# Patient Record
Sex: MALE | Race: WHITE | Employment: OTHER | URBAN - METROPOLITAN AREA
[De-identification: names, ages, dates, MRNs, and addresses within clinical notes are randomized per-mention and may not be internally consistent; named-entity substitution may affect disease eponyms.]

---

## 2019-02-22 ENCOUNTER — HOSPITAL ENCOUNTER (OUTPATIENT)
Dept: LAB | Age: 74
Discharge: HOME | End: 2019-02-22

## 2019-05-09 ENCOUNTER — APPOINTMENT (OUTPATIENT)
Dept: CT IMAGING | Age: 74
DRG: 234 | End: 2019-05-09
Attending: NURSE PRACTITIONER
Payer: MEDICARE

## 2019-05-09 ENCOUNTER — APPOINTMENT (OUTPATIENT)
Dept: VASCULAR SURGERY | Age: 74
DRG: 234 | End: 2019-05-09
Attending: NURSE PRACTITIONER
Payer: MEDICARE

## 2019-05-09 ENCOUNTER — APPOINTMENT (OUTPATIENT)
Dept: GENERAL RADIOLOGY | Age: 74
DRG: 234 | End: 2019-05-09
Attending: NURSE PRACTITIONER
Payer: MEDICARE

## 2019-05-09 ENCOUNTER — HOSPITAL ENCOUNTER (INPATIENT)
Age: 74
LOS: 11 days | Discharge: HOME HEALTH | DRG: 234 | End: 2019-05-20
Attending: INTERNAL MEDICINE | Admitting: THORACIC SURGERY (CARDIOTHORACIC VASCULAR SURGERY)
Payer: MEDICARE

## 2019-05-09 DIAGNOSIS — R94.30 NONSPECIFIC ABNORMAL FUNCTION STUDY, CARDIOVASCULAR: ICD-10-CM

## 2019-05-09 DIAGNOSIS — I25.10 CORONARY ARTERY DISEASE INVOLVING NATIVE CORONARY ARTERY OF NATIVE HEART, ANGINA PRESENCE UNSPECIFIED: ICD-10-CM

## 2019-05-09 DIAGNOSIS — I25.119 CORONARY ARTERY DISEASE WITH ANGINA PECTORIS, UNSPECIFIED VESSEL OR LESION TYPE, UNSPECIFIED WHETHER NATIVE OR TRANSPLANTED HEART (HCC): ICD-10-CM

## 2019-05-09 DIAGNOSIS — Z95.1 S/P CABG X 3: Primary | ICD-10-CM

## 2019-05-09 LAB
ABO + RH BLD: NORMAL
ALBUMIN SERPL-MCNC: 2.8 G/DL (ref 3.5–5)
ALBUMIN/GLOB SERPL: 0.9 {RATIO} (ref 1.1–2.2)
ALP SERPL-CCNC: 98 U/L (ref 45–117)
ALT SERPL-CCNC: 25 U/L (ref 12–78)
ANION GAP SERPL CALC-SCNC: 6 MMOL/L (ref 5–15)
APPEARANCE UR: CLEAR
APTT PPP: 29.6 SEC (ref 22.1–32)
AST SERPL-CCNC: 21 U/L (ref 15–37)
BACTERIA URNS QL MICRO: NEGATIVE /HPF
BASOPHILS # BLD: 0 K/UL (ref 0–0.1)
BASOPHILS NFR BLD: 0 % (ref 0–1)
BILIRUB SERPL-MCNC: 0.3 MG/DL (ref 0.2–1)
BILIRUB UR QL: NEGATIVE
BLOOD GROUP ANTIBODIES SERPL: NORMAL
BNP SERPL-MCNC: 129 PG/ML
BUN SERPL-MCNC: 15 MG/DL (ref 6–20)
BUN/CREAT SERPL: 19 (ref 12–20)
CALCIUM SERPL-MCNC: 8 MG/DL (ref 8.5–10.1)
CHLORIDE SERPL-SCNC: 111 MMOL/L (ref 97–108)
CO2 SERPL-SCNC: 25 MMOL/L (ref 21–32)
COLOR UR: ABNORMAL
CREAT SERPL-MCNC: 0.8 MG/DL (ref 0.7–1.3)
DIFFERENTIAL METHOD BLD: ABNORMAL
EOSINOPHIL # BLD: 0.3 K/UL (ref 0–0.4)
EOSINOPHIL NFR BLD: 2 % (ref 0–7)
EPITH CASTS URNS QL MICRO: ABNORMAL /LPF
ERYTHROCYTE [DISTWIDTH] IN BLOOD BY AUTOMATED COUNT: 12.7 % (ref 11.5–14.5)
EST. AVERAGE GLUCOSE BLD GHB EST-MCNC: 114 MG/DL
GLOBULIN SER CALC-MCNC: 3.2 G/DL (ref 2–4)
GLUCOSE SERPL-MCNC: 98 MG/DL (ref 65–100)
GLUCOSE UR STRIP.AUTO-MCNC: NEGATIVE MG/DL
HBA1C MFR BLD: 5.6 % (ref 4.2–6.3)
HCT VFR BLD AUTO: 41.5 % (ref 36.6–50.3)
HGB BLD-MCNC: 13.6 G/DL (ref 12.1–17)
HGB UR QL STRIP: ABNORMAL
HYALINE CASTS URNS QL MICRO: ABNORMAL /LPF (ref 0–5)
IMM GRANULOCYTES # BLD AUTO: 0.1 K/UL (ref 0–0.04)
IMM GRANULOCYTES NFR BLD AUTO: 0 % (ref 0–0.5)
INR PPP: 1 (ref 0.9–1.1)
KETONES UR QL STRIP.AUTO: NEGATIVE MG/DL
LEUKOCYTE ESTERASE UR QL STRIP.AUTO: NEGATIVE
LYMPHOCYTES # BLD: 1.7 K/UL (ref 0.8–3.5)
LYMPHOCYTES NFR BLD: 14 % (ref 12–49)
MAGNESIUM SERPL-MCNC: 2.3 MG/DL (ref 1.6–2.4)
MCH RBC QN AUTO: 30.4 PG (ref 26–34)
MCHC RBC AUTO-ENTMCNC: 32.8 G/DL (ref 30–36.5)
MCV RBC AUTO: 92.6 FL (ref 80–99)
MONOCYTES # BLD: 1 K/UL (ref 0–1)
MONOCYTES NFR BLD: 9 % (ref 5–13)
NEUTS SEG # BLD: 9.1 K/UL (ref 1.8–8)
NEUTS SEG NFR BLD: 75 % (ref 32–75)
NITRITE UR QL STRIP.AUTO: NEGATIVE
NRBC # BLD: 0 K/UL (ref 0–0.01)
NRBC BLD-RTO: 0 PER 100 WBC
PH UR STRIP: 5 [PH] (ref 5–8)
PLATELET # BLD AUTO: 313 K/UL (ref 150–400)
PMV BLD AUTO: 10 FL (ref 8.9–12.9)
POTASSIUM SERPL-SCNC: 4 MMOL/L (ref 3.5–5.1)
PROT SERPL-MCNC: 6 G/DL (ref 6.4–8.2)
PROT UR STRIP-MCNC: NEGATIVE MG/DL
PROTHROMBIN TIME: 10.4 SEC (ref 9–11.1)
RBC # BLD AUTO: 4.48 M/UL (ref 4.1–5.7)
RBC #/AREA URNS HPF: ABNORMAL /HPF (ref 0–5)
SODIUM SERPL-SCNC: 142 MMOL/L (ref 136–145)
SP GR UR REFRACTOMETRY: 1.01 (ref 1–1.03)
SPECIMEN EXP DATE BLD: NORMAL
THERAPEUTIC RANGE,PTTT: NORMAL SECS (ref 58–77)
TSH SERPL DL<=0.05 MIU/L-ACNC: 0.72 UIU/ML (ref 0.36–3.74)
UA: UC IF INDICATED,UAUC: ABNORMAL
UROBILINOGEN UR QL STRIP.AUTO: 0.2 EU/DL (ref 0.2–1)
WBC # BLD AUTO: 12.1 K/UL (ref 4.1–11.1)
WBC URNS QL MICRO: ABNORMAL /HPF (ref 0–4)

## 2019-05-09 PROCEDURE — 74011250637 HC RX REV CODE- 250/637: Performed by: NURSE PRACTITIONER

## 2019-05-09 PROCEDURE — 74011000258 HC RX REV CODE- 258: Performed by: RADIOLOGY

## 2019-05-09 PROCEDURE — 36415 COLL VENOUS BLD VENIPUNCTURE: CPT

## 2019-05-09 PROCEDURE — B2151ZZ FLUOROSCOPY OF LEFT HEART USING LOW OSMOLAR CONTRAST: ICD-10-PCS | Performed by: INTERNAL MEDICINE

## 2019-05-09 PROCEDURE — 74011000250 HC RX REV CODE- 250: Performed by: NURSE PRACTITIONER

## 2019-05-09 PROCEDURE — 86900 BLOOD TYPING SEROLOGIC ABO: CPT

## 2019-05-09 PROCEDURE — 74011250636 HC RX REV CODE- 250/636: Performed by: INTERNAL MEDICINE

## 2019-05-09 PROCEDURE — 85025 COMPLETE CBC W/AUTO DIFF WBC: CPT

## 2019-05-09 PROCEDURE — 93458 L HRT ARTERY/VENTRICLE ANGIO: CPT | Performed by: INTERNAL MEDICINE

## 2019-05-09 PROCEDURE — 77030019569 HC BND COMPR RAD TERU -B: Performed by: INTERNAL MEDICINE

## 2019-05-09 PROCEDURE — 77030013744: Performed by: INTERNAL MEDICINE

## 2019-05-09 PROCEDURE — 74011000250 HC RX REV CODE- 250: Performed by: INTERNAL MEDICINE

## 2019-05-09 PROCEDURE — 85730 THROMBOPLASTIN TIME PARTIAL: CPT

## 2019-05-09 PROCEDURE — 83036 HEMOGLOBIN GLYCOSYLATED A1C: CPT

## 2019-05-09 PROCEDURE — 80053 COMPREHEN METABOLIC PANEL: CPT

## 2019-05-09 PROCEDURE — B2111ZZ FLUOROSCOPY OF MULTIPLE CORONARY ARTERIES USING LOW OSMOLAR CONTRAST: ICD-10-PCS | Performed by: INTERNAL MEDICINE

## 2019-05-09 PROCEDURE — 93880 EXTRACRANIAL BILAT STUDY: CPT

## 2019-05-09 PROCEDURE — 74011250636 HC RX REV CODE- 250/636

## 2019-05-09 PROCEDURE — 85610 PROTHROMBIN TIME: CPT

## 2019-05-09 PROCEDURE — 99152 MOD SED SAME PHYS/QHP 5/>YRS: CPT | Performed by: INTERNAL MEDICINE

## 2019-05-09 PROCEDURE — 94640 AIRWAY INHALATION TREATMENT: CPT

## 2019-05-09 PROCEDURE — 71275 CT ANGIOGRAPHY CHEST: CPT

## 2019-05-09 PROCEDURE — 93005 ELECTROCARDIOGRAM TRACING: CPT

## 2019-05-09 PROCEDURE — 83735 ASSAY OF MAGNESIUM: CPT

## 2019-05-09 PROCEDURE — 4A023N7 MEASUREMENT OF CARDIAC SAMPLING AND PRESSURE, LEFT HEART, PERCUTANEOUS APPROACH: ICD-10-PCS | Performed by: INTERNAL MEDICINE

## 2019-05-09 PROCEDURE — 81001 URINALYSIS AUTO W/SCOPE: CPT

## 2019-05-09 PROCEDURE — 77030029684 HC NEB SM VOL KT MONA -A

## 2019-05-09 PROCEDURE — 84443 ASSAY THYROID STIM HORMONE: CPT

## 2019-05-09 PROCEDURE — 74174 CTA ABD&PLVS W/CONTRAST: CPT

## 2019-05-09 PROCEDURE — C1769 GUIDE WIRE: HCPCS | Performed by: INTERNAL MEDICINE

## 2019-05-09 PROCEDURE — 74011636320 HC RX REV CODE- 636/320: Performed by: RADIOLOGY

## 2019-05-09 PROCEDURE — 83880 ASSAY OF NATRIURETIC PEPTIDE: CPT

## 2019-05-09 PROCEDURE — 77030004532 HC CATH ANGI DX IMP BSC -A: Performed by: INTERNAL MEDICINE

## 2019-05-09 PROCEDURE — C1894 INTRO/SHEATH, NON-LASER: HCPCS | Performed by: INTERNAL MEDICINE

## 2019-05-09 PROCEDURE — 99153 MOD SED SAME PHYS/QHP EA: CPT | Performed by: INTERNAL MEDICINE

## 2019-05-09 PROCEDURE — 74011636320 HC RX REV CODE- 636/320: Performed by: INTERNAL MEDICINE

## 2019-05-09 PROCEDURE — 65660000000 HC RM CCU STEPDOWN

## 2019-05-09 RX ORDER — TAMSULOSIN HYDROCHLORIDE 0.4 MG/1
0.4 CAPSULE ORAL DAILY
Status: DISCONTINUED | OUTPATIENT
Start: 2019-05-10 | End: 2019-05-10

## 2019-05-09 RX ORDER — SODIUM CHLORIDE 0.9 % (FLUSH) 0.9 %
5-40 SYRINGE (ML) INJECTION AS NEEDED
Status: DISCONTINUED | OUTPATIENT
Start: 2019-05-09 | End: 2019-05-14 | Stop reason: HOSPADM

## 2019-05-09 RX ORDER — HEPARIN SODIUM 200 [USP'U]/100ML
INJECTION, SOLUTION INTRAVENOUS
Status: COMPLETED | OUTPATIENT
Start: 2019-05-09 | End: 2019-05-09

## 2019-05-09 RX ORDER — SODIUM CHLORIDE 9 MG/ML
1.5 INJECTION, SOLUTION INTRAVENOUS CONTINUOUS
Status: DISPENSED | OUTPATIENT
Start: 2019-05-09 | End: 2019-05-09

## 2019-05-09 RX ORDER — TAMSULOSIN HYDROCHLORIDE 0.4 MG/1
0.4 CAPSULE ORAL DAILY
COMMUNITY

## 2019-05-09 RX ORDER — ATORVASTATIN CALCIUM 40 MG/1
40 TABLET, FILM COATED ORAL
COMMUNITY

## 2019-05-09 RX ORDER — ALPRAZOLAM 0.5 MG/1
0.5 TABLET ORAL
Status: DISCONTINUED | OUTPATIENT
Start: 2019-05-09 | End: 2019-05-20 | Stop reason: HOSPADM

## 2019-05-09 RX ORDER — SODIUM CHLORIDE 0.9 % (FLUSH) 0.9 %
5-40 SYRINGE (ML) INJECTION EVERY 8 HOURS
Status: DISCONTINUED | OUTPATIENT
Start: 2019-05-09 | End: 2019-05-14 | Stop reason: HOSPADM

## 2019-05-09 RX ORDER — IBUPROFEN 200 MG
1 TABLET ORAL DAILY
Status: DISCONTINUED | OUTPATIENT
Start: 2019-05-09 | End: 2019-05-20 | Stop reason: HOSPADM

## 2019-05-09 RX ORDER — GUAIFENESIN 100 MG/5ML
81 LIQUID (ML) ORAL DAILY
Status: DISCONTINUED | OUTPATIENT
Start: 2019-05-10 | End: 2019-05-14 | Stop reason: HOSPADM

## 2019-05-09 RX ORDER — FLUTICASONE PROPIONATE 50 MCG
2 SPRAY, SUSPENSION (ML) NASAL DAILY
Status: DISCONTINUED | OUTPATIENT
Start: 2019-05-10 | End: 2019-05-20 | Stop reason: HOSPADM

## 2019-05-09 RX ORDER — PANTOPRAZOLE SODIUM 40 MG/1
40 TABLET, DELAYED RELEASE ORAL DAILY
Status: DISCONTINUED | OUTPATIENT
Start: 2019-05-10 | End: 2019-05-14 | Stop reason: SDUPTHER

## 2019-05-09 RX ORDER — CETIRIZINE HCL 10 MG
10 TABLET ORAL DAILY
Status: DISCONTINUED | OUTPATIENT
Start: 2019-05-10 | End: 2019-05-20 | Stop reason: HOSPADM

## 2019-05-09 RX ORDER — LIDOCAINE HYDROCHLORIDE 10 MG/ML
INJECTION INFILTRATION; PERINEURAL AS NEEDED
Status: DISCONTINUED | OUTPATIENT
Start: 2019-05-09 | End: 2019-05-09 | Stop reason: HOSPADM

## 2019-05-09 RX ORDER — DIPHENHYDRAMINE HYDROCHLORIDE 50 MG/ML
INJECTION, SOLUTION INTRAMUSCULAR; INTRAVENOUS AS NEEDED
Status: DISCONTINUED | OUTPATIENT
Start: 2019-05-09 | End: 2019-05-09 | Stop reason: HOSPADM

## 2019-05-09 RX ORDER — MONTELUKAST SODIUM 10 MG/1
10 TABLET ORAL DAILY
COMMUNITY

## 2019-05-09 RX ORDER — PANTOPRAZOLE SODIUM 40 MG/1
40 TABLET, DELAYED RELEASE ORAL DAILY
COMMUNITY
End: 2019-06-13

## 2019-05-09 RX ORDER — ATORVASTATIN CALCIUM 40 MG/1
40 TABLET, FILM COATED ORAL DAILY
Status: DISCONTINUED | OUTPATIENT
Start: 2019-05-10 | End: 2019-05-20 | Stop reason: HOSPADM

## 2019-05-09 RX ORDER — ALBUTEROL SULFATE 90 UG/1
1 AEROSOL, METERED RESPIRATORY (INHALATION) AS NEEDED
COMMUNITY

## 2019-05-09 RX ORDER — FENTANYL CITRATE 50 UG/ML
INJECTION, SOLUTION INTRAMUSCULAR; INTRAVENOUS AS NEEDED
Status: DISCONTINUED | OUTPATIENT
Start: 2019-05-09 | End: 2019-05-09 | Stop reason: HOSPADM

## 2019-05-09 RX ORDER — HEPARIN SODIUM 1000 [USP'U]/ML
INJECTION, SOLUTION INTRAVENOUS; SUBCUTANEOUS AS NEEDED
Status: DISCONTINUED | OUTPATIENT
Start: 2019-05-09 | End: 2019-05-09 | Stop reason: HOSPADM

## 2019-05-09 RX ORDER — IPRATROPIUM BROMIDE AND ALBUTEROL SULFATE 2.5; .5 MG/3ML; MG/3ML
3 SOLUTION RESPIRATORY (INHALATION)
Status: DISCONTINUED | OUTPATIENT
Start: 2019-05-09 | End: 2019-05-20 | Stop reason: HOSPADM

## 2019-05-09 RX ORDER — CHLORHEXIDINE GLUCONATE 1.2 MG/ML
15 RINSE ORAL EVERY 12 HOURS
Status: DISCONTINUED | OUTPATIENT
Start: 2019-05-09 | End: 2019-05-15

## 2019-05-09 RX ORDER — BUDESONIDE 0.5 MG/2ML
500 INHALANT ORAL
Status: DISCONTINUED | OUTPATIENT
Start: 2019-05-09 | End: 2019-05-20 | Stop reason: HOSPADM

## 2019-05-09 RX ORDER — DIPHENHYDRAMINE HCL 25 MG
25 CAPSULE ORAL DAILY
COMMUNITY
End: 2019-08-27

## 2019-05-09 RX ORDER — VERAPAMIL HYDROCHLORIDE 2.5 MG/ML
INJECTION, SOLUTION INTRAVENOUS AS NEEDED
Status: DISCONTINUED | OUTPATIENT
Start: 2019-05-09 | End: 2019-05-09 | Stop reason: HOSPADM

## 2019-05-09 RX ORDER — MIDAZOLAM HYDROCHLORIDE 1 MG/ML
INJECTION, SOLUTION INTRAMUSCULAR; INTRAVENOUS AS NEEDED
Status: DISCONTINUED | OUTPATIENT
Start: 2019-05-09 | End: 2019-05-09 | Stop reason: HOSPADM

## 2019-05-09 RX ORDER — SODIUM CHLORIDE 9 MG/ML
3 INJECTION, SOLUTION INTRAVENOUS CONTINUOUS
Status: DISPENSED | OUTPATIENT
Start: 2019-05-09 | End: 2019-05-09

## 2019-05-09 RX ORDER — ARFORMOTEROL TARTRATE 15 UG/2ML
15 SOLUTION RESPIRATORY (INHALATION)
Status: DISCONTINUED | OUTPATIENT
Start: 2019-05-09 | End: 2019-05-20 | Stop reason: HOSPADM

## 2019-05-09 RX ORDER — METOPROLOL TARTRATE 25 MG/1
12.5 TABLET, FILM COATED ORAL EVERY 12 HOURS
Status: DISCONTINUED | OUTPATIENT
Start: 2019-05-09 | End: 2019-05-15

## 2019-05-09 RX ORDER — SODIUM CHLORIDE 0.9 % (FLUSH) 0.9 %
10 SYRINGE (ML) INJECTION
Status: COMPLETED | OUTPATIENT
Start: 2019-05-09 | End: 2019-05-09

## 2019-05-09 RX ADMIN — BUDESONIDE 500 MCG: 0.5 INHALANT RESPIRATORY (INHALATION) at 21:41

## 2019-05-09 RX ADMIN — SODIUM CHLORIDE 3 ML/KG/HR: 900 INJECTION, SOLUTION INTRAVENOUS at 08:28

## 2019-05-09 RX ADMIN — Medication 10 ML: at 21:48

## 2019-05-09 RX ADMIN — SODIUM CHLORIDE 100 ML: 900 INJECTION, SOLUTION INTRAVENOUS at 16:24

## 2019-05-09 RX ADMIN — IOPAMIDOL 100 ML: 755 INJECTION, SOLUTION INTRAVENOUS at 16:24

## 2019-05-09 RX ADMIN — Medication 10 ML: at 16:24

## 2019-05-09 RX ADMIN — CHLORHEXIDINE GLUCONATE 15 ML: 1.2 RINSE ORAL at 21:47

## 2019-05-09 RX ADMIN — METOPROLOL TARTRATE 12.5 MG: 25 TABLET ORAL at 17:56

## 2019-05-09 RX ADMIN — ARFORMOTEROL TARTRATE 15 MCG: 15 SOLUTION RESPIRATORY (INHALATION) at 21:40

## 2019-05-09 RX ADMIN — ALPRAZOLAM 0.5 MG: 0.5 TABLET ORAL at 20:02

## 2019-05-09 NOTE — PROCEDURES
Cardiac Catheterization Procedure Note   Patient: Chiara Crawford  MRN: [id number]  SSN: [id number]   YOB: 1945 Age: 75 y.o.   Sex: male    Date of Procedure: 5/9/2019   Pre-procedure Diagnosis: Shortness of Breath, Abnormal nuclear stress   Post-procedure Diagnosis: Coronary Artery Disease  Procedure: Left Heart Cath, Cors, LVgram, moderate sedation  :  Dr. Zak Fletcher MD    Assistant(s):  None  Anesthesia: Moderate Sedation   Estimated Blood Loss: Less than 10 mL   Specimens Removed: None  Findings: Significant LM and RCA disease; consult placed to cardiac surgery  Complications: None   Implants:  None  Signed by:  Zak Fletcher MD  5/9/2019  10:02 AM

## 2019-05-09 NOTE — CONSULTS
CSS Consult Note     Subjective:      Sneha Alvarado is a 75 y.o. male who was referred for cardiac evaluation of coronary artery disease, referred by Dr. Eliu Newell. Pt's PMH includes R carotid endarterectomy, PAD(s/p L  LE angioplasty), Hyperlipidemia, arthritis, COPD, BPH (had had issues in past with urination post surgery/anesthesia). Pt reports several month history of fatigue, lethargy, weight loss (about 15 lbs), productive cough, new onset reflux, some mild dysphagia, diarrhea (about 3 weeks, better in last 3 days). Pt denies SOB, CP, syncope, palpitations, edema, orthopnea, or PND. Pt's been managed by PCP, who had set pt up for Abdomen CT for next week and further follow up with pulmonary. Pt is retired, and despite more fatigue is still able to golf and work in the garden. He is  and lives with his wife in single family home. His sister also accompanies him today. Pt is active tobacco smoker (3/4 PPD x 50 years), drinks 3-4 alcoholic drinks/week and denies drug use. Denies family history of cardiac problems. Cardiac Testing    Cardiac catheterization 5/9/19: Significant LM and RCA disease;  Formal report pending     ECHO: Pending     Past Medical History:   Diagnosis Date    Arthritis     Asthma     INHALER USED DAILY    Cancer ([facility] [address]     SKIN    Other ill-defined conditions(799.89)     HIGH CHOLESTEROL    Other ill-defined conditions(799.89)     PVD    Other ill-defined conditions(799.89)     OCCAS INSOMNIA     Past Surgical History:   Procedure Laterality Date    VASCULAR SURGERY PROCEDURE UNLIST      LEFT LEG, SFA ANGIOPLASTY      Social History     Tobacco Use    Smoking status: Current Every Day Smoker     Packs/day: 0.75     Years: 50.00     Pack years: 37.50    Smokeless tobacco: Never Used   Substance Use Topics    Alcohol use: Yes     Alcohol/week: 2.5 oz     Types: 5 drink(s) per week      No family history on file.   Prior to Admission medications    Medication Sig Start Date End Date Taking? Authorizing Provider   glycopyrrolate-formoterol (Jacquiline Plaster) 9-4.8 mcg HFAA Take  by inhalation as needed (Wheezing). Yes Provider, Historical   atorvastatin (LIPITOR) 40 mg tablet Take 40 mg by mouth daily. Yes Provider, Historical   montelukast (SINGULAIR) 10 mg tablet Take 10 mg by mouth daily. Yes Provider, Historical   pantoprazole (PROTONIX) 40 mg tablet Take 40 mg by mouth daily. Yes Provider, Historical   fluticasone propionate (ALLER-SUKUMAR NA) by Nasal route two (2) times a day. Yes Provider, Historical   tamsulosin (FLOMAX) 0.4 mg capsule Take 0.4 mg by mouth daily. Yes Provider, Historical   diphenhydrAMINE (DIPHENHIST) 25 mg capsule Take 25 mg by mouth daily. At bedtime   Indications: chronic trouble sleeping   Yes Provider, Historical   albuterol (PROAIR HFA) 90 mcg/actuation inhaler Take 1 Puff by inhalation every four (4) hours as needed for Wheezing. Yes Provider, Historical   MAGNESIUM PO Take 250 mg by mouth nightly. Yes Provider, Historical   aspirin 81 mg tablet Take 81 mg by mouth nightly. Provider, Historical       No Known Allergies      Review of Systems:   Consititutional: Denies fever or chills. ++ weight loss  Eyes:  Denies use of glasses or vision problems(cataracts). ENT:  Denies hearing or swallowing difficulty. CV: Denies CP, ++ claudication(Mild, R calf), HTN. Resp: Denies dyspnea,  ++ productive cough  : Denies dialysis or kidney problems. GI: Denies ulcers, esophageal strictures, liver problems. M/S: Denies joint or bone problems, or implanted artificial hardware. Skin: Denies varicose veins, edema. Neuro: Denies strokes, or TIAs. Psych: Denies anxiety or depression. Endocrine: Denies thyroid problems or diabetes. Heme/Lymphatic: Denies lymphedema.   ++ easy bruising     Objective:     VS:   Visit Vitals  /82 (BP 1 Location: Left arm, BP Patient Position: At rest)   Pulse 66   Temp 98 °F (36.7 °C)   Resp 16   Ht 5' 7\" (1.702 m)   Wt 161 lb 6 oz (73.2 kg)   SpO2 100%   BMI 25.28 kg/m²         Physical Exam:    General appearance: alert, cooperative, no distress  Head: normocephalic, without obvious abnormality; atraumatic  Eyes: conjunctivae/corneas clear; EOM's intact. Nose: nares normal; no drainage. Neck: no carotid bruit and no JVD  Lungs: clear to auscultation bilaterally, ++ productive cough   Heart: regular rate and rhythm; no murmur  Abdomen: soft, non-tender; bowel sounds normal  Extremities: moves all extremities; no weakness. Skin: Skin color normal; No varicose veins or edema. Neurologic: Grossly normal      Labs:   Recent Labs     05/09/19  1433   WBC 12.1*   HGB 13.6   HCT 41.5         K 4.0   BUN 15   CREA 0.80   GLU 98   INR 1.0       Diagnostics:   PA and lateral:   CXR Results  (Last 48 hours)    None          Carotid doppler: Consistent with normal right and left ICA stenosis and left ECA is greater than 50% stenosis    PFTS-FEV1:  1.49--56% prebronchodilator, 1.74 -65% post bronchodilator (obtain from [facility], done 4/9/19)    EKG:   NSR w/ 1st deg block     ABIs:  Pending (trying to obtain from Dr. Jhon Douglass office)    C/A/P CTA 5/9/19: Mild to moderate atherosclerosis in the aorta without significant stenosis      Regional hypoattenuation in the distal pancreatic body and tail and the uncinate  process of uncertain significance. No discrete mass. No pancreatic ductal  dilatation. No significant peripancreatic inflammation.     Prominent and minimally enlarged scattered mesenteric lymph nodes with hazy  surrounding fat infiltration     Tiny, < 4 mm left upper lobe lung nodule     Minimally enlarged bilateral hilar lymph nodes     Assessment:     Active Problems:    CAD (coronary artery disease) (5/9/2019)        Plan:   The risk and benefit of surgery were reviewed with patient and family and all questions answered and the patient wishes to proceed.  Risk include infection, bleeding, stroke, heart attack, irregular heart rhythm, kidney failure and death. STS Risk Calculator V2.81 - Discussed by surgeon with patient. CABG only  Risk of Mortality:1.394%  Renal Failure:0.647%  Permanent Stroke:0.943%  Prolonged Ventilation:4.919%  DSW Infection:0.217%  Reoperation:2.026%  Morbidity or Mortality:7.564%  Short Length of Stay:53.459%  Long Length of Stay:3.715%      Treatment Plan:    1. CAD: significant LM and RCA dx. Tentatively scheduled for CABG, 5/15 w/ Fiser. Preop workup and education started. Need to work thru GI issues prior to surgery. Obtain TTE. On asa, statin, BB.    2. Dysphagia/Weight loss/new onset GERD sx:  GI Consult. Obtain C/A/P CTA. Protonix. 3. Hx of R carotid endarectomy: Repeat carotid dopplers. 4. Hx of PVD, L angioplasty: Followed by Dr. Helder Lawton. Obtain last studies (ABIs?) and office notes. 5. COPD/several month hx of productive cough:  PRN duo-nebs, steroid nebs. Zrytec/Flonase. Wife reports had stopped Singulair recently d/t concern for worsening diarrhea. Obtain Chest CTA. May repeat PFTs, obtain [facility] records and recent PFTs. May need to consult Pulmonary. 6. HLD:  Cont statin. 7. BPH:  On flomax. Signed By: Ml Zimmer NP     May 10, 2019    Saw patient, agree with above  Risk of morbidity and mortality - high  Medical decision making - high complexity    1. CAD: significant LM and RCA dx. Tentatively scheduled for CABG,   2. Dysphagia/Weight loss/new onset GERD sx:  GI following. C/A/P CTA & MRI      3. Hx of R carotid endarectomy: Repeat carotid dopplers ok. 4. Hx of PVD,(L SFA BAP 1/2007, R SFA atherectomy 3/2018): Followed by Dr. Helder Lawton   5. COPD/several month hx of productive cough:  PRN duo-nebs, steroid nebs. Zrytec/Flonase/Singulair. No obvious source on Chest CT for cough,      6. HLD:    statin. 7. BPH:   Flomax.    8. SR w/ 1st deg block:  Stable,

## 2019-05-09 NOTE — PROGRESS NOTES
Cardiac Cath Lab Recovery Arrival Note: 
 
 
Tank Hong arrived to Cardiac Cath Lab, Recovery Area. Staff introduced to patient. Patient identifiers verified with NAME and DATE OF BIRTH. Procedure verified with patient. Consent forms reviewed and signed by patient or authorized representative and verified. Allergies verified. Patient informed of procedure and plan of care. Questions answered with review. Patient prepped for procedure, per orders from physician, prior to arrival. 
 
Patient on cardiac monitor, non-invasive blood pressure, SPO2 monitor. Patient is A&Ox 4. Patient reports no complaints except a cough. Patient in stretcher, in low position, with side rails up, call bell within reach, patient instructed to call of assistance as needed. Patient prep in: [facility] Recovery Area, Bed# 3. Family in: waiting room. Prep by: GRACE Guzman

## 2019-05-09 NOTE — PROGRESS NOTES
TRANSFER - IN REPORT: 
 
Verbal report received from [place] [id number] on [employer]  being received from procedure area for routine progression of care. Report consisted of patients Situation, Background, Assessment and Recommendations(SBAR). Information from the following report(s) SBAR, Procedure Summary, MAR, Recent Results and Cardiac Rhythm NSR was reviewed with the receiving clinician. Opportunity for questions and clarification was provided. Assessment completed upon patients arrival to [address] [place] [address] and care assumed. Cardiac Cath Lab Recovery Arrival Note: 
 
[employer] arrived to [facility] recovery area. Patient procedure= LHC. Patient on cardiac monitor, non-invasive blood pressure, SPO2 monitor. On  O2 @ 2 lpm via NC.  IV  of NS on pump at 109 ml/hr. Patient status doing well without problems. Patient is A&Ox 4. Patient reports No Pain. PROCEDURE SITE CHECK: 
 
Procedure site:without any bleeding and No Hematoma, No pain/discomfort reported at procedure site. No change in patient status. Continue to monitor patient and status.

## 2019-05-09 NOTE — ROUTINE PROCESS
TRANSFER - OUT REPORT: 
 
Verbal report given to TRUDY Geller(name) on Tavares Mercado  being transferred to CVSU(unit) for routine progression of care Report consisted of patients Situation, Background, Assessment and  
Recommendations(SBAR). Information from the following report(s) SBAR, Kardex, Procedure Summary, Intake/Output, MAR and Recent Results was reviewed with the receiving nurse. Lines:  
Peripheral IV 05/09/19 Right Antecubital (Active) Opportunity for questions and clarification was provided. Patient transported with: 
 Monitor Registered Nurse Labs and urine specimen sent. RN spoke to Steven about the CT. Steven would like the CT to be done before the PA lateral chest xray if possible. 1520:  Carotids finished. EKG obtained. Pt. Going to CT scan with nurse and monitor. 1426:  Obtaining CT scan.

## 2019-05-09 NOTE — PROGRESS NOTES
1103:  2 ml of air removed out of right TR band. No bleeding and no hematoma. The patient is aware to call if bleeding is noted. Will continue to monitor. 1120:  3 ml of air removed out of right TR band. No bleeding and no hematoma. Will continue to monitor. 1135:  3 ml of air removed out of right TR band. No bleeding and no hematoma. Will continue to monitor. 1205:  3 ml of air removed out of right TR band. No bleeding and no hematoma. Will continue to monitor. 1227:  1 ml of air removed out of right TR band. No bleeding and no hematoma. Will continue to monitor. This completes the removal of air. 1300:  TR band removed. No bleeding and no hematoma. Sterile guaze and transparent dressing placed. Will continue to monitor.

## 2019-05-10 ENCOUNTER — APPOINTMENT (OUTPATIENT)
Dept: NON INVASIVE DIAGNOSTICS | Age: 74
DRG: 234 | End: 2019-05-10
Attending: NURSE PRACTITIONER
Payer: MEDICARE

## 2019-05-10 ENCOUNTER — APPOINTMENT (OUTPATIENT)
Dept: GENERAL RADIOLOGY | Age: 74
DRG: 234 | End: 2019-05-10
Attending: NURSE PRACTITIONER
Payer: MEDICARE

## 2019-05-10 ENCOUNTER — APPOINTMENT (OUTPATIENT)
Dept: MRI IMAGING | Age: 74
DRG: 234 | End: 2019-05-10
Attending: NURSE PRACTITIONER
Payer: MEDICARE

## 2019-05-10 LAB
ATRIAL RATE: 64 BPM
CALCULATED P AXIS, ECG09: 73 DEGREES
CALCULATED R AXIS, ECG10: -33 DEGREES
CALCULATED T AXIS, ECG11: 43 DEGREES
DIAGNOSIS, 93000: NORMAL
LEFT CCA DIST DIAS: 16.7 CM/S
LEFT CCA DIST SYS: 83 CM/S
LEFT CCA PROX DIAS: 11.9 CM/S
LEFT CCA PROX SYS: 74.7 CM/S
LEFT ECA DIAS: 11.97 CM/S
LEFT ECA SYS: 298.7 CM/S
LEFT ICA DIST DIAS: 22.1 CM/S
LEFT ICA DIST SYS: 104.5 CM/S
LEFT ICA MID DIAS: 21.7 CM/S
LEFT ICA MID SYS: 120.4 CM/S
LEFT ICA PROX DIAS: 12.4 CM/S
LEFT ICA PROX SYS: 101.1 CM/S
LEFT VERTEBRAL DIAS: 13.79 CM/S
LEFT VERTEBRAL SYS: 65.6 CM/S
P-R INTERVAL, ECG05: 212 MS
Q-T INTERVAL, ECG07: 400 MS
QRS DURATION, ECG06: 94 MS
QTC CALCULATION (BEZET), ECG08: 412 MS
RIGHT CCA DIST DIAS: 12.4 CM/S
RIGHT CCA DIST SYS: 81.7 CM/S
RIGHT CCA PROX DIAS: 9.2 CM/S
RIGHT CCA PROX SYS: 81.9 CM/S
RIGHT ECA DIAS: 10.68 CM/S
RIGHT ECA SYS: 198.8 CM/S
RIGHT ICA DIST DIAS: 26.7 CM/S
RIGHT ICA DIST SYS: 113.4 CM/S
RIGHT ICA MID DIAS: 24.6 CM/S
RIGHT ICA MID SYS: 140.8 CM/S
RIGHT ICA PROX DIAS: 26.8 CM/S
RIGHT ICA PROX SYS: 149.5 CM/S
RIGHT ICA/CCA SYS: 1.8
RIGHT VERTEBRAL DIAS: 12.27 CM/S
RIGHT VERTEBRAL SYS: 91.4 CM/S
VENTRICULAR RATE, ECG03: 64 BPM

## 2019-05-10 PROCEDURE — 94640 AIRWAY INHALATION TREATMENT: CPT

## 2019-05-10 PROCEDURE — 65660000000 HC RM CCU STEPDOWN

## 2019-05-10 PROCEDURE — 74011250637 HC RX REV CODE- 250/637: Performed by: NURSE PRACTITIONER

## 2019-05-10 PROCEDURE — 74011000250 HC RX REV CODE- 250: Performed by: NURSE PRACTITIONER

## 2019-05-10 PROCEDURE — 74011250636 HC RX REV CODE- 250/636: Performed by: THORACIC SURGERY (CARDIOTHORACIC VASCULAR SURGERY)

## 2019-05-10 PROCEDURE — A9585 GADOBUTROL INJECTION: HCPCS | Performed by: THORACIC SURGERY (CARDIOTHORACIC VASCULAR SURGERY)

## 2019-05-10 PROCEDURE — 77030021566 MRI ABD W WO CONT

## 2019-05-10 PROCEDURE — 71046 X-RAY EXAM CHEST 2 VIEWS: CPT

## 2019-05-10 PROCEDURE — 93306 TTE W/DOPPLER COMPLETE: CPT

## 2019-05-10 PROCEDURE — 74011250637 HC RX REV CODE- 250/637: Performed by: INTERNAL MEDICINE

## 2019-05-10 PROCEDURE — 74011000258 HC RX REV CODE- 258: Performed by: THORACIC SURGERY (CARDIOTHORACIC VASCULAR SURGERY)

## 2019-05-10 RX ORDER — SODIUM CHLORIDE 9 MG/ML
100 INJECTION, SOLUTION INTRAVENOUS CONTINUOUS
Status: DISPENSED | OUTPATIENT
Start: 2019-05-10 | End: 2019-05-10

## 2019-05-10 RX ORDER — SODIUM CHLORIDE 0.9 % (FLUSH) 0.9 %
5-40 SYRINGE (ML) INJECTION AS NEEDED
Status: DISCONTINUED | OUTPATIENT
Start: 2019-05-10 | End: 2019-05-20 | Stop reason: HOSPADM

## 2019-05-10 RX ORDER — PREDNISONE 20 MG/1
40 TABLET ORAL
Status: DISCONTINUED | OUTPATIENT
Start: 2019-05-11 | End: 2019-05-16

## 2019-05-10 RX ORDER — SODIUM CHLORIDE 0.9 % (FLUSH) 0.9 %
5-40 SYRINGE (ML) INJECTION EVERY 8 HOURS
Status: DISCONTINUED | OUTPATIENT
Start: 2019-05-10 | End: 2019-05-20 | Stop reason: HOSPADM

## 2019-05-10 RX ORDER — DEXTROMETHORPHAN/PSEUDOEPHED 2.5-7.5/.8
1.2 DROPS ORAL
Status: DISCONTINUED | OUTPATIENT
Start: 2019-05-10 | End: 2019-05-20 | Stop reason: HOSPADM

## 2019-05-10 RX ORDER — FLUMAZENIL 0.1 MG/ML
0.2 INJECTION INTRAVENOUS
Status: ACTIVE | OUTPATIENT
Start: 2019-05-10 | End: 2019-05-10

## 2019-05-10 RX ORDER — MONTELUKAST SODIUM 10 MG/1
10 TABLET ORAL
Status: DISCONTINUED | OUTPATIENT
Start: 2019-05-10 | End: 2019-05-20 | Stop reason: HOSPADM

## 2019-05-10 RX ORDER — EPINEPHRINE 0.1 MG/ML
1 INJECTION INTRACARDIAC; INTRAVENOUS
Status: ACTIVE | OUTPATIENT
Start: 2019-05-10 | End: 2019-05-11

## 2019-05-10 RX ORDER — FINASTERIDE 5 MG/1
5 TABLET, FILM COATED ORAL DAILY
Status: DISCONTINUED | OUTPATIENT
Start: 2019-05-11 | End: 2019-05-20 | Stop reason: HOSPADM

## 2019-05-10 RX ORDER — NALOXONE HYDROCHLORIDE 0.4 MG/ML
0.4 INJECTION, SOLUTION INTRAMUSCULAR; INTRAVENOUS; SUBCUTANEOUS
Status: ACTIVE | OUTPATIENT
Start: 2019-05-10 | End: 2019-05-10

## 2019-05-10 RX ORDER — ATROPINE SULFATE 0.1 MG/ML
0.5 INJECTION INTRAVENOUS
Status: ACTIVE | OUTPATIENT
Start: 2019-05-10 | End: 2019-05-11

## 2019-05-10 RX ORDER — TAMSULOSIN HYDROCHLORIDE 0.4 MG/1
0.8 CAPSULE ORAL DAILY
Status: DISCONTINUED | OUTPATIENT
Start: 2019-05-11 | End: 2019-05-20 | Stop reason: HOSPADM

## 2019-05-10 RX ORDER — TAMSULOSIN HYDROCHLORIDE 0.4 MG/1
0.4 CAPSULE ORAL ONCE
Status: COMPLETED | OUTPATIENT
Start: 2019-05-10 | End: 2019-05-10

## 2019-05-10 RX ORDER — MIDAZOLAM HYDROCHLORIDE 1 MG/ML
.25-1 INJECTION, SOLUTION INTRAMUSCULAR; INTRAVENOUS
Status: ACTIVE | OUTPATIENT
Start: 2019-05-10 | End: 2019-05-10

## 2019-05-10 RX ORDER — FENTANYL CITRATE 50 UG/ML
200 INJECTION, SOLUTION INTRAMUSCULAR; INTRAVENOUS
Status: ACTIVE | OUTPATIENT
Start: 2019-05-10 | End: 2019-05-10

## 2019-05-10 RX ADMIN — SODIUM CHLORIDE 100 ML: 900 INJECTION, SOLUTION INTRAVENOUS at 16:06

## 2019-05-10 RX ADMIN — PANTOPRAZOLE SODIUM 40 MG: 40 TABLET, DELAYED RELEASE ORAL at 06:25

## 2019-05-10 RX ADMIN — Medication 20 ML: at 21:09

## 2019-05-10 RX ADMIN — TAMSULOSIN HYDROCHLORIDE 0.4 MG: 0.4 CAPSULE ORAL at 09:00

## 2019-05-10 RX ADMIN — Medication 10 ML: at 06:26

## 2019-05-10 RX ADMIN — ATORVASTATIN CALCIUM 40 MG: 40 TABLET, FILM COATED ORAL at 09:00

## 2019-05-10 RX ADMIN — CETIRIZINE HYDROCHLORIDE 10 MG: 10 TABLET, FILM COATED ORAL at 09:00

## 2019-05-10 RX ADMIN — GADOBUTROL 7.5 ML: 604.72 INJECTION INTRAVENOUS at 16:06

## 2019-05-10 RX ADMIN — TAMSULOSIN HYDROCHLORIDE 0.4 MG: 0.4 CAPSULE ORAL at 14:37

## 2019-05-10 RX ADMIN — ARFORMOTEROL TARTRATE 15 MCG: 15 SOLUTION RESPIRATORY (INHALATION) at 07:37

## 2019-05-10 RX ADMIN — MONTELUKAST SODIUM 10 MG: 10 TABLET, FILM COATED ORAL at 21:10

## 2019-05-10 RX ADMIN — METOPROLOL TARTRATE 12.5 MG: 25 TABLET ORAL at 21:10

## 2019-05-10 RX ADMIN — BUDESONIDE 500 MCG: 0.5 INHALANT RESPIRATORY (INHALATION) at 07:37

## 2019-05-10 RX ADMIN — ARFORMOTEROL TARTRATE 15 MCG: 15 SOLUTION RESPIRATORY (INHALATION) at 18:31

## 2019-05-10 RX ADMIN — ASPIRIN 81 MG 81 MG: 81 TABLET ORAL at 09:00

## 2019-05-10 RX ADMIN — CHLORHEXIDINE GLUCONATE 15 ML: 1.2 RINSE ORAL at 21:09

## 2019-05-10 RX ADMIN — ALPRAZOLAM 0.5 MG: 0.5 TABLET ORAL at 21:10

## 2019-05-10 RX ADMIN — CHLORHEXIDINE GLUCONATE 15 ML: 1.2 RINSE ORAL at 09:00

## 2019-05-10 RX ADMIN — METOPROLOL TARTRATE 12.5 MG: 25 TABLET ORAL at 09:00

## 2019-05-10 RX ADMIN — BUDESONIDE 500 MCG: 0.5 INHALANT RESPIRATORY (INHALATION) at 18:30

## 2019-05-10 NOTE — PROGRESS NOTES
Receive report from Prerna Morales RN and report given to Fab[place], [address] [facility], RN. Patient resting in bed at this time. 0730: Bedside and Verbal shift change report given to Ortiz Torres RN (oncoming nurse) by [place] [address] [facility], RN (offgoing nurse). Report included the following information SBAR, Kardex, Intake/Output, MAR, Recent Results, Med Rec Status and Cardiac Rhythm NSR.   
 
I have read and reviewed charting and documention on this patient by Marcial Rey

## 2019-05-10 NOTE — PROGRESS NOTES
0730: Bedside shift change report given to Yair Méndez 90  (oncoming nurse) by Fabby RN (offgoing nurse). Report included the following information SBAR and Kardex. 1500: patient off the floor for MRI. 1930: Bedside shift change report given to Josephinesafiamelissacris 64 (oncoming nurse) by Mitul Su RN (offgoing nurse). Report included the following information SBAR and Kardex. Problem: Falls - Risk of 
Goal: *Absence of Falls Description Document Iveth Arriaga Fall Risk and appropriate interventions in the flowsheet. Outcome: Progressing Towards Goal 
  
Problem: Patient Education: Go to Patient Education Activity Goal: Patient/Family Education Outcome: Progressing Towards Goal

## 2019-05-10 NOTE — PROGRESS NOTES
1930: Bedside and Verbal shift change report given to [facility] and Fabby, RN (oncoming nurse) by [address] [facility], RN (offgoing nurse). Report included the following information SBAR, Kardex, Intake/Output, MAR, Accordion, Recent Results and Cardiac Rhythm NSR.  
 
0730: Bedside and Verbal shift change report given to [address] [place], [place] [address] [facility], RN  (oncoming nurse) by [facility], Counts include 234 beds at the [facility] and [facility], RN (offgoing nurse). Report included the following information SBAR, Kardex, Intake/Output, MAR, Accordion, Recent Results and Cardiac Rhythm NSR.

## 2019-05-10 NOTE — CONSULTS
Name: Ariel Carp: Rocael Muro   : 1945 Admit Date: 2019   Phone: [phone]  Room: [facility]   PCP: Vitaly Hurtado MD  MRN: [id number]   Date: 5/10/2019  Code: Full Code        HPI:    5:53 PM       History was obtained from patient. I was asked by Madhavi Zacarias MD to see Alfred Arenas in consultation for a chief complaint of cough. History of Present Illness: 75year old male with past medical history as given below who presented to [facility] with complaints of cough. He was admitted to [facility] with chest pain and noted to have left main disease and is scheduled for CABG on 5/15/2019. Patient has been complaining of cough for the past 3 months. Has not tried any antibiotics before. Has cough productive of sputum mostly clear. No fever, chills, night sweats. Occasional wheezing. Some chest tightness. Does have complain of nasal stuffiness, post nasal drip. Has used omeprazole without much benefit but feels something is stuck in his throat when he eats. Records from out patient reviewed:  -Positive skin allergy testing. Dust, willow, hackery, cottonwood, [employer], alternaria, elm, maple, ryegrass, walnut, sweetgum, wheat.  -IgE 112  -PFT: FEV1 1.49 L (56%) ==> post bd 1.74 L (65%) = 17% increase in FEV1. Air trapping. Normal dlco.    Images:  CT Chest reviewed - no endobronchial lesions. Some small nodules less than 4 mm. Past Medical History:   Diagnosis Date    Arthritis     Asthma     INHALER USED DAILY    Cancer ([facility] [address]     SKIN    Other ill-defined conditions(799.89)     HIGH CHOLESTEROL    Other ill-defined conditions(799.89)     PVD    Other ill-defined conditions(799.89)     OCCAS INSOMNIA       Past Surgical History:   Procedure Laterality Date    VASCULAR SURGERY PROCEDURE UNLIST      LEFT LEG, SFA ANGIOPLASTY       No family history on file.     Social History     Tobacco Use    Smoking status: Current Every Day Smoker     Packs/day: 0.75     Years: 50.00 Pack years: 37.50    Smokeless tobacco: Never Used   Substance Use Topics    Alcohol use:  Yes     Alcohol/week: 2.5 oz     Types: 5 drink(s) per week       No Known Allergies    Current Facility-Administered Medications   Medication Dose Route Frequency    [START ON 5/11/2019] tamsulosin (FLOMAX) capsule 0.8 mg  0.8 mg Oral DAILY    [START ON 5/11/2019] finasteride (PROSCAR) tablet 5 mg  5 mg Oral DAILY    0.9% sodium chloride infusion  100 mL/hr IntraVENous CONTINUOUS    sodium chloride (NS) flush 5-40 mL  5-40 mL IntraVENous Q8H    sodium chloride (NS) flush 5-40 mL  5-40 mL IntraVENous PRN    simethicone (MYLICON) 60BH/1.3XA oral drops 80 mg  1.2 mL Oral Multiple    atropine injection 0.5 mg  0.5 mg IntraVENous ONCE PRN    EPINEPHrine (ADRENALIN) 0.1 mg/mL syringe 1 mg  1 mg Endoscopically ONCE PRN    [START ON 5/11/2019] predniSONE (DELTASONE) tablet 40 mg  40 mg Oral DAILY WITH LUNCH    montelukast (SINGULAIR) tablet 10 mg  10 mg Oral QHS    sodium chloride (NS) flush 5-40 mL  5-40 mL IntraVENous Q8H    sodium chloride (NS) flush 5-40 mL  5-40 mL IntraVENous PRN    aspirin chewable tablet 81 mg  81 mg Oral DAILY    sodium phosphate (FLEET'S) enema 1 Enema  1 Enema Rectal PRN    chlorhexidine (PERIDEX) 0.12 % mouthwash 15 mL  15 mL Oral Q12H    atorvastatin (LIPITOR) tablet 40 mg  40 mg Oral DAILY    fluticasone propionate (FLONASE) 50 mcg/actuation nasal spray 2 Spray  2 Spray Both Nostrils DAILY    pantoprazole (PROTONIX) tablet 40 mg  40 mg Oral DAILY    arformoterol (BROVANA) neb solution 15 mcg  15 mcg Nebulization BID RT    And    budesonide (PULMICORT) 500 mcg/2 ml nebulizer suspension  500 mcg Nebulization BID RT    albuterol-ipratropium (DUO-NEB) 2.5 MG-0.5 MG/3 ML  3 mL Nebulization Q6H PRN    cetirizine (ZYRTEC) tablet 10 mg  10 mg Oral DAILY    metoprolol tartrate (LOPRESSOR) tablet 12.5 mg  12.5 mg Oral Q12H    nicotine (NICODERM CQ) 21 mg/24 hr patch 1 Patch  1 Patch TransDERmal DAILY    ALPRAZolam (XANAX) tablet 0.5 mg  0.5 mg Oral TID PRN         REVIEW OF SYSTEMS   Negative except as stated in the HPI. Physical Exam:   Visit Vitals  /41 (BP 1 Location: Left arm, BP Patient Position: Sitting)   Pulse 68   Temp 98 °F (36.7 °C)   Resp 18   Ht 5' 7\" (1.702 m)   Wt 73.2 kg (161 lb 6 oz)   SpO2 95%   BMI 25.28 kg/m²       General:  Alert, cooperative, no distress, appears stated age. Head:  Normocephalic, without obvious abnormality, atraumatic. Eyes:  Conjunctivae/corneas clear. PERRL, EOMs intact. Nose: Nares normal. Septum midline. Mucosa normal. No drainage or sinus tenderness. Throat: Lips, mucosa, and tongue normal. Teeth and gums normal.   Neck: Supple, symmetrical, trachea midline, no adenopathy, thyroid: no enlargment/tenderness/nodules, no carotid bruit and no JVD. Back:   Symmetric, no curvature. ROM normal.   Lungs:   Occasional rhonchi. Chest wall:  No tenderness or deformity. Heart:  Regular rate and rhythm, S1, S2 normal, no murmur, click, rub or gallop. Abdomen:   Soft, non-tender. Bowel sounds normal. No masses,  No organomegaly. Extremities: Extremities normal, atraumatic, no cyanosis or edema. Pulses: 2+ and symmetric all extremities.    Skin: Skin color, texture, turgor normal. No rashes or lesions   Lymph nodes: Cervical, supraclavicular, and axillary nodes normal.   Neurologic: Grossly nonfocal       Lab Results   Component Value Date/Time    Sodium 142 05/09/2019 02:33 PM    Potassium 4.0 05/09/2019 02:33 PM    Chloride 111 (H) 05/09/2019 02:33 PM    CO2 25 05/09/2019 02:33 PM    BUN 15 05/09/2019 02:33 PM    Creatinine 0.80 05/09/2019 02:33 PM    Glucose 98 05/09/2019 02:33 PM    Calcium 8.0 (L) 05/09/2019 02:33 PM    Magnesium 2.3 05/09/2019 02:33 PM       Lab Results   Component Value Date/Time    WBC 12.1 (H) 05/09/2019 02:33 PM    HGB 13.6 05/09/2019 02:33 PM    PLATELET 332 66/03/5097 02:33 PM    MCV 92.6 05/09/2019 02:33 PM       Lab Results   Component Value Date/Time    INR 1.0 05/09/2019 02:33 PM    aPTT 29.6 05/09/2019 02:33 PM    AST (SGOT) 21 05/09/2019 02:33 PM    Alk. phosphatase 98 05/09/2019 02:33 PM    Protein, total 6.0 (L) 05/09/2019 02:33 PM    Albumin 2.8 (L) 05/09/2019 02:33 PM    Globulin 3.2 05/09/2019 02:33 PM       No results found for: IRON, FE, TIBC, IBCT, PSAT, FERR    Lab Results   Component Value Date/Time    TSH 0.72 05/09/2019 02:33 PM        Lab Results   Component Value Date/Time    Color YELLOW/STRAW 05/09/2019 02:24 PM    Appearance CLEAR 05/09/2019 02:24 PM    Specific gravity 1.010 05/09/2019 02:24 PM    pH (UA) 5.0 05/09/2019 02:24 PM    Protein NEGATIVE  05/09/2019 02:24 PM    Glucose NEGATIVE  05/09/2019 02:24 PM    Ketone NEGATIVE  05/09/2019 02:24 PM    Bilirubin NEGATIVE  05/09/2019 02:24 PM    Blood TRACE (A) 05/09/2019 02:24 PM    Urobilinogen 0.2 05/09/2019 02:24 PM    Nitrites NEGATIVE  05/09/2019 02:24 PM    Leukocyte Esterase NEGATIVE  05/09/2019 02:24 PM    WBC 0-4 05/09/2019 02:24 PM    RBC 0-5 05/09/2019 02:24 PM    Bacteria NEGATIVE  05/09/2019 02:24 PM       IMPRESSION:  ===========  -Cough - suspect cough related to allergies and post nasal drip.  -Obstructive airway disease; asthma more likely than copd. -former smoker; quit 3 months back. -GERD. -pre operative pulmonary clearance. PLAN:  =====  -Start oral prednisone 40 gm q daily. If cough related to allergies, should improve quickly.  -Add singulair, continue with Flonase and cetrizine.  -Continue with pulmicort / Collette Simmering nebz,. Albuterol prn.  -out patient evaluation for biologics. -c/w Protonix.  -Post surgery cough suppressants (with codeine). -Overall all pre operative pulmonary risk mild. Has quit smoking 3 weeks back. Thank you for the consult.     Marty Bergeron MD

## 2019-05-10 NOTE — CONSULTS
[address] NOTE  [facility]al office  [phone] NP in-hospital cell phone M-F until 4:30  After 5pm or on weekends, please call  for physician on call        NAME:  Romana Cress   :   1945   MRN:   [id number]       Referring Physician: Carlos A Newton    Consult Date: 5/10/2019 10:28 AM    Chief Complaint: several months of weight loss, diarrhea, GERD, fatigue. Has CAD needs CABG next week     History of Present Illness:  Patient is a 75 y.o. who is seen in consultation at the request of Carlos A Newton NP for several months of weight loss, diarrhea, GERD, fatigue. Has CAD needs CABG next week. Medical history as listed below. He presented yesterday and had left heart cath with significant LM and RCA disease. He reports worsening cough over the past 2 months - he is a smoker with COPD. He was told cough could be related to reflux. He reports only occasional mild reflux after eating very spicy foods. He reports decreased appetite due to dysgeusia over the past couple months. He reports 10-15 pound weight loss over the past 6 weeks. He also had 10 days of diarrhea that resolved spontaneously. He reports significant fatigue over the past several weeks, he also had a tick bite. He denies nausea, abdominal pain, constipation, melena, or hematochezia. He smokes 3/4 PPD. He drinks 3-4 drinks/week. He denies NSAID's. He has never had an EGD or colonoscopy. He reports normal cologuard at some point. I have reviewed the emergency room note, hospital admission note, notes by all other clinicians who have seen the patient during this hospitalization to date. I have reviewed the problem list and the reason for this hospitalization. I have reviewed the allergies and the medications the patient was taking at home prior to this hospitalization.     PMH:  Past Medical History: Diagnosis Date    Arthritis     Asthma     INHALER USED DAILY    Cancer ([facility], [place] [address]     SKIN    Other ill-defined conditions(799.89)     HIGH CHOLESTEROL    Other ill-defined conditions(799.89)     PVD    Other ill-defined conditions(799.89)     OCCAS INSOMNIA       PSH:  Past Surgical History:   Procedure Laterality Date    VASCULAR SURGERY PROCEDURE UNLIST      LEFT LEG, SFA ANGIOPLASTY       Allergies:  No Known Allergies    Home Medications:  Prior to Admission Medications   Prescriptions Last Dose Informant Patient Reported? Taking? MAGNESIUM PO 4/9/2019 at Unknown time Self Yes Yes   Sig: Take 250 mg by mouth nightly. albuterol (PROAIR HFA) 90 mcg/actuation inhaler 5/9/2019 at Unknown time  Yes Yes   Sig: Take 1 Puff by inhalation every four (4) hours as needed for Wheezing. aspirin 81 mg tablet 5/7/2019  Yes No   Sig: Take 81 mg by mouth nightly. atorvastatin (LIPITOR) 40 mg tablet 5/8/2019 at 2100  Yes Yes   Sig: Take 40 mg by mouth daily. diphenhydrAMINE (DIPHENHIST) 25 mg capsule 5/8/2019 at 2100  Yes Yes   Sig: Take 25 mg by mouth daily. At bedtime   Indications: chronic trouble sleeping   fluticasone propionate (ALLER-SUKUMAR NA) 5/8/2019 at 2100  Yes Yes   Sig: by Nasal route two (2) times a day. glycopyrrolate-formoterol (Prudence Field) 9-4.8 mcg HFAA 5/8/2019 at 2100  Yes Yes   Sig: Take  by inhalation as needed (Wheezing). montelukast (SINGULAIR) 10 mg tablet 5/8/2019 at 2100  Yes Yes   Sig: Take 10 mg by mouth daily. pantoprazole (PROTONIX) 40 mg tablet 5/7/2019 at Unknown time  Yes Yes   Sig: Take 40 mg by mouth daily. tamsulosin (FLOMAX) 0.4 mg capsule 5/8/2019 at 2100  Yes Yes   Sig: Take 0.4 mg by mouth daily.       Facility-Administered Medications: None       Hospital Medications:  Current Facility-Administered Medications   Medication Dose Route Frequency    sodium chloride (NS) flush 5-40 mL  5-40 mL IntraVENous Q8H    sodium chloride (NS) flush 5-40 mL  5-40 mL IntraVENous PRN    aspirin chewable tablet 81 mg  81 mg Oral DAILY    sodium phosphate (FLEET'S) enema 1 Enema  1 Enema Rectal PRN    chlorhexidine (PERIDEX) 0.12 % mouthwash 15 mL  15 mL Oral Q12H    atorvastatin (LIPITOR) tablet 40 mg  40 mg Oral DAILY    fluticasone propionate (FLONASE) 50 mcg/actuation nasal spray 2 Spray  2 Spray Both Nostrils DAILY    pantoprazole (PROTONIX) tablet 40 mg  40 mg Oral DAILY    tamsulosin (FLOMAX) capsule 0.4 mg  0.4 mg Oral DAILY    arformoterol (BROVANA) neb solution 15 mcg  15 mcg Nebulization BID RT    And    budesonide (PULMICORT) 500 mcg/2 ml nebulizer suspension  500 mcg Nebulization BID RT    albuterol-ipratropium (DUO-NEB) 2.5 MG-0.5 MG/3 ML  3 mL Nebulization Q6H PRN    cetirizine (ZYRTEC) tablet 10 mg  10 mg Oral DAILY    metoprolol tartrate (LOPRESSOR) tablet 12.5 mg  12.5 mg Oral Q12H    nicotine (NICODERM CQ) 21 mg/24 hr patch 1 Patch  1 Patch TransDERmal DAILY    ALPRAZolam (XANAX) tablet 0.5 mg  0.5 mg Oral TID PRN       Social History:  Social History     Tobacco Use    Smoking status: Current Every Day Smoker     Packs/day: 0.75     Years: 50.00     Pack years: 37.50    Smokeless tobacco: Never Used   Substance Use Topics    Alcohol use: Yes     Alcohol/week: 2.5 oz     Types: 5 drink(s) per week       Family History:  No family history on file.     Review of Systems:  Constitutional: negative fever, negative chills, +weight loss  Eyes:   +visual changes  ENT:   negative sore throat, tongue or lip swelling  Respiratory:  +cough, +dyspnea  Cards:  negative for chest pain, palpitations, lower extremity edema  GI:   See HPI  :  negative for frequency, dysuria  Integument:  negative for rash and pruritus  Heme:  +for easy bruising and gum/nose bleeding  Musculoskeletal:negative for myalgias, back pain and muscle weakness  Neuro:    negative for headaches, dizziness, vertigo  Psych:  negative for feelings of anxiety, depression     Objective: Patient Vitals for the past 8 hrs:   BP Temp Pulse Resp SpO2 Weight   05/10/19 0805  98 °F (36.7 °C)       05/10/19 0744 124/82  66 16 100 %    05/10/19 0644      73.2 kg (161 lb 6 oz)   05/10/19 0431 123/51 97.6 °F (36.4 °C) (!) 58 16 94 %      05/10 0701 - 05/10 1900  In: 100 [P.O.:100]  Out: -   05/08 1901 - 05/10 0700  In: 480 [P.O.:480]  Out: 300 [Urine:300]    EXAM:     CONST:  Pleasant male lying in bed, no acute distress   NEURO:  alert and oriented x 3   HEENT: EOMI, no scleral icterus   LUNGS: Diminished, + wheeze   CARD:  S1 S2   ABD:  soft, no tenderness, no rebound, bowel sounds (+) all 4 quadrants, no masses, non distended   EXT:  no edema, warm   PSYCH: full, not anxious     Data Review     Recent Labs     05/09/19  1433   WBC 12.1*   HGB 13.6   HCT 41.5        Recent Labs     05/09/19  1433      K 4.0   *   CO2 25   BUN 15   CREA 0.80   GLU 98   CA 8.0*     Recent Labs     05/09/19  1433   SGOT 21   AP 98   TP 6.0*   ALB 2.8*   GLOB 3.2     Recent Labs     05/09/19  1433   INR 1.0   PTP 10.4   APTT 29.6     Impression:  Mild to moderate atherosclerosis in the aorta without significant stenosis   Regional hypoattenuation in the distal pancreatic body and tail and the uncinate process of uncertain significance. No discrete mass. No pancreatic ductal dilatation. No significant peripancreatic inflammation. Prominent and minimally enlarged scattered mesenteric lymph nodes with hazy surrounding fat infiltration  Tiny, < 4 mm left upper lobe lung nodule  Minimally enlarged bilateral hilar lymph nodes     Assessment:   · Weight loss: decreased appetite due to dysgeusia   · ? Reflux: cough in a smoker with COPD   · Diarrhea: self-limited 10 days, resovled  · Hypoattenuation in the distal pancreatic body, tail, and uncinate process with scattered enlarged mesenteric lymph nodes   · CAD: plan for CABG 5/15  · COPD     Patient Active Problem List   Diagnosis Code    Carotid stenosis I65.29    CAD (coronary artery disease) I25.10     Plan:     · PPI  · Will further characterize pancreatic abnormality with MRI  · Plan for EGD Monday, if agreeable with Dr. Jumana Linares  · Patient discussed with and will be seen by Dr. Misti Venegas  · Thank you for allowing me to participate in care of Vel Rodriguez By: Corrinne Brackett, NP     5/10/2019  10:28 AM       Gastroenterology Attending Physician attestation statement and comments. This patient was seen and examined by me in a face-to-face visit today. I reviewed the medical record including lab work, imaging and other provider notes. I confirmed the history as described above. I spoke to the patient, reviewed the medical record including lab work, imaging and other provider notes. I discussed this case in detail with Dago Ernandez. I formulated an  assessment of this patient and developed a treatment plan. I agree with the above consultation note. I agree with the history, exam and assessment and plan as outlined in the note.   I would like to add the following:   MRI of pancreas to r/o any pancreas mass  He will benefit from EGD but not cleared by cardiology for that, may do UGI study instead  consider pulmonary consult for cough

## 2019-05-10 NOTE — PROGRESS NOTES
[facility] FLOOR Progress Note Admit Date: 2019 Subjective:  
Pt seen with Dr. Guanakito Jackson. Resting in bed getting neb. On RA. Afebrile. Reporting urinary frequency with low amount of urine passed (has hx of this post procedure). Objective:  
 
Visit Vitals /82 (BP 1 Location: Left arm, BP Patient Position: At rest) Pulse 66 Temp 98 °F (36.7 °C) Resp 16 Ht 5' 7\" (1.702 m) Wt 161 lb 6 oz (73.2 kg) SpO2 100% BMI 25.28 kg/m² Temp (24hrs), Av.9 °F (36.6 °C), Min:97.6 °F (36.4 °C), Max:98.3 °F (36.8 °C) Last 24hr Input/Output: 
 
Intake/Output Summary (Last 24 hours) at 5/10/2019 0932 Last data filed at 5/10/2019 5337 Gross per 24 hour Intake 480 ml Output 300 ml Net 180 ml  
  
 
EKG:  SR w/ 1st deg block Oxygen: RA 
 
CXR:   
CXR Results  (Last 48 hours) None Admission Weight: Last Weight Weight: 161 lb (73 kg) Weight: 161 lb 6 oz (73.2 kg) EXAM: 
   
Lungs:   Clear to auscultation bilaterally. Heart:  Regular rate and rhythm, S1, S2 normal, no murmur, click, rub or gallop. Abdomen:   Soft, non-tender. Bowel sounds normal. No masses,  No organomegaly. Extremities:  No edema. PPP. Neurologic:  Gross motor and sensory apparatus intact. Activity: ad chloe Diet:  Cardiac diet Lab Data Reviewed:  
Recent Labs 19 
1433 WBC 12.1* HGB 13.6 HCT 41.5  CREA 0.80 INR 1.0 Cardiac Testing:  
 
TTE:  Completed 19,  Formal report pending . Assessment:  
 
Active Problems: 
  CAD (coronary artery disease) (2019) Plan/Recommendations/Medical Decision Makin. CAD: significant LM and RCA dx. Tentatively scheduled for CABG, 5/15 w/ Fiser. Preop workup and education started. Need to work thru GI issues prior to surgery. Obtain TTE. On asa, statin, BB. Needs ABG, Type & Cross, PA/lat. 2. Dysphagia/Weight loss/new onset GERD sx:  GI Consult.   C/A/P CTA completed, discuss w/ GI. Protonix. 3. Hx of R carotid endarectomy: Repeat carotid dopplers ok. 4. Hx of PVD,(L SFA BAP 1/2007, R SFA atherectomy 3/2018): Followed by Dr. Jenny Mora. ABIs from April acceptable. L leg > R leg (1.08 vs. 0.95). 5. COPD/several month hx of productive cough:  PRN duo-nebs, steroid nebs. Zrytec/Flonase. Wife reports had stopped Singulair recently d/t concern for worsening diarrhea. No obvious source on Chest CT for cough, may be GI related. PFTs acceptable from April (FEV1 1.74--65% predicted). consult Pulmonary. 6. HLD:  Cont statin. 7. BPH:  On flomax. Has hx of retention/hesitancy post procedure. Will increase flomax dosage, add proscar. UA 5/9 negative. 8. SR w/ 1st deg block:  Stable, cont BB. 9. Hypoattenuation in the distal pancreatic body, tail, and uncinate process with scattered enlarged mesenteric lymph nodes:  Discuss w/ GI. Will obtain MRI For further clarity. Signed By: Liza Rondon NP Saw patient, agree with above Risk of morbidity and mortality - high Medical decision making - high complexity 1. CAD: significant LM and RCA dx. Tentatively scheduled for CABG,  
2. Dysphagia/Weight loss/new onset GERD sx:  GI following. C/A/P CTA & MRI 3. Hx of R carotid endarectomy: Repeat carotid dopplers ok. 4. Hx of PVD,(L SFA BAP 1/2007, R SFA atherectomy 3/2018): Followed by Dr. Jenny Mora 5. COPD/several month hx of productive cough:  PRN duo-nebs, steroid nebs. Zrytec/Flonase/Singulair. No obvious source on Chest CT for cough,     
6. HLD:    statin. 7. BPH:   Flomax.   
8. SR w/ 1st deg block:  Stable,

## 2019-05-10 NOTE — PROGRESS NOTES
Problem: Falls - Risk of 
Goal: *Absence of Falls Description Document Tia Manus Fall Risk and appropriate interventions in the flowsheet. Outcome: Progressing Towards Goal 
  
Problem: Patient Education: Go to Patient Education Activity Goal: Patient/Family Education Outcome: Progressing Towards Goal 
  
Problem: Patient Education: Go to Patient Education Activity Goal: Patient/Family Education Outcome: Progressing Towards Goal 
  
Problem: Cath Lab Procedures: Post-Cath Day of Procedure (Initiate SCIP Measures for Post-Op Care) Goal: Activity/Safety Outcome: Progressing Towards Goal 
Goal: Nutrition/Diet Outcome: Progressing Towards Goal 
Goal: Respiratory Outcome: Progressing Towards Goal 
Goal: Treatments/Interventions/Procedures Outcome: Progressing Towards Goal 
Goal: *Procedure site is without bleeding and signs of infection six hours post sheath removal 
Outcome: Progressing Towards Goal 
Goal: *Hemodynamically stable Outcome: Progressing Towards Goal 
Goal: *Optimal pain control at patient's stated goal 
Outcome: Progressing Towards Goal 
  
Problem: Cath Lab Procedures: Discharge Outcomes Goal: *Stable cardiac rhythm Outcome: Progressing Towards Goal 
Goal: *Hemodynamically stable Outcome: Progressing Towards Goal 
Goal: *Optimal pain control at patient's stated goal 
Outcome: Progressing Towards Goal 
Goal: *Pulses palpable, skin color within defined limits, skin temperature warm Outcome: Progressing Towards Goal 
  
Problem: Pressure Injury - Risk of 
Goal: *Prevention of pressure injury Description Document Gian Scale and appropriate interventions in the flowsheet. Outcome: Progressing Towards Goal 
  
Problem: Patient Education: Go to Patient Education Activity Goal: Patient/Family Education Outcome: Progressing Towards Goal

## 2019-05-10 NOTE — CARDIO/PULMONARY
Cardiac Rehab: CABG education material at the bedside of [facility] 5 Through Surgery updated and surgeon's card added. Patient is off the floor at this time. Will continue to follow for pre-op education.  Anjana Schneider RN

## 2019-05-10 NOTE — PROGRESS NOTES
The patient is scheduled for CABG 5/15- CM attempted to meet with patient at bedside, spouse present, patient off the floor for testing. The CM will follow-up with patient and spouse later as able and appropriate for assessment.

## 2019-05-11 LAB
ECHO LA MAJOR AXIS: 3.89 CM
ECHO LV INTERNAL DIMENSION DIASTOLIC: 4.5 CM (ref 4.2–5.9)
ECHO LV INTERNAL DIMENSION SYSTOLIC: 2.59 CM
ECHO LV IVSD: 0.65 CM (ref 0.6–1)
ECHO LV MASS 2D: 102.5 G (ref 88–224)
ECHO LV MASS INDEX 2D: 55.5 G/M2 (ref 49–115)
ECHO LV POSTERIOR WALL DIASTOLIC: 0.73 CM (ref 0.6–1)
ECHO RV INTERNAL DIMENSION: 2.96 CM
ECHO RV TAPSE: 3.08 CM (ref 1.5–2)

## 2019-05-11 PROCEDURE — 74011250637 HC RX REV CODE- 250/637: Performed by: INTERNAL MEDICINE

## 2019-05-11 PROCEDURE — 74011000250 HC RX REV CODE- 250: Performed by: NURSE PRACTITIONER

## 2019-05-11 PROCEDURE — 65660000000 HC RM CCU STEPDOWN

## 2019-05-11 PROCEDURE — 74011250637 HC RX REV CODE- 250/637: Performed by: NURSE PRACTITIONER

## 2019-05-11 PROCEDURE — 74011636637 HC RX REV CODE- 636/637: Performed by: INTERNAL MEDICINE

## 2019-05-11 PROCEDURE — 94640 AIRWAY INHALATION TREATMENT: CPT

## 2019-05-11 RX ADMIN — Medication 10 ML: at 06:54

## 2019-05-11 RX ADMIN — Medication 10 ML: at 14:00

## 2019-05-11 RX ADMIN — PREDNISONE 40 MG: 20 TABLET ORAL at 12:13

## 2019-05-11 RX ADMIN — BUDESONIDE 500 MCG: 0.5 INHALANT RESPIRATORY (INHALATION) at 09:17

## 2019-05-11 RX ADMIN — METOPROLOL TARTRATE 12.5 MG: 25 TABLET ORAL at 21:28

## 2019-05-11 RX ADMIN — BUDESONIDE 500 MCG: 0.5 INHALANT RESPIRATORY (INHALATION) at 19:09

## 2019-05-11 RX ADMIN — CHLORHEXIDINE GLUCONATE 15 ML: 1.2 RINSE ORAL at 10:15

## 2019-05-11 RX ADMIN — ASPIRIN 81 MG 81 MG: 81 TABLET ORAL at 08:05

## 2019-05-11 RX ADMIN — PANTOPRAZOLE SODIUM 40 MG: 40 TABLET, DELAYED RELEASE ORAL at 06:54

## 2019-05-11 RX ADMIN — CHLORHEXIDINE GLUCONATE 15 ML: 1.2 RINSE ORAL at 21:28

## 2019-05-11 RX ADMIN — Medication 10 ML: at 21:28

## 2019-05-11 RX ADMIN — ARFORMOTEROL TARTRATE 15 MCG: 15 SOLUTION RESPIRATORY (INHALATION) at 09:17

## 2019-05-11 RX ADMIN — FINASTERIDE 5 MG: 5 TABLET, FILM COATED ORAL at 08:07

## 2019-05-11 RX ADMIN — METOPROLOL TARTRATE 12.5 MG: 25 TABLET ORAL at 08:04

## 2019-05-11 RX ADMIN — TAMSULOSIN HYDROCHLORIDE 0.8 MG: 0.4 CAPSULE ORAL at 08:05

## 2019-05-11 RX ADMIN — ATORVASTATIN CALCIUM 40 MG: 40 TABLET, FILM COATED ORAL at 08:04

## 2019-05-11 RX ADMIN — ARFORMOTEROL TARTRATE 15 MCG: 15 SOLUTION RESPIRATORY (INHALATION) at 19:09

## 2019-05-11 RX ADMIN — FLUTICASONE PROPIONATE 2 SPRAY: 50 SPRAY, METERED NASAL at 08:07

## 2019-05-11 RX ADMIN — MONTELUKAST SODIUM 10 MG: 10 TABLET, FILM COATED ORAL at 21:28

## 2019-05-11 RX ADMIN — CETIRIZINE HYDROCHLORIDE 10 MG: 10 TABLET, FILM COATED ORAL at 08:04

## 2019-05-11 NOTE — PROGRESS NOTES
[facility] Pre-operative  Note Admit Date: 2019 Subjective:  
Pt seen with Dr. Rasta NDIAYE, eating breakfast, not coughing during my visit. Patient has lost anisa without dieting. GI workup prior to CABG scheduled Objective:  
 
Visit Vitals /51 (BP 1 Location: Left arm, BP Patient Position: At rest) Pulse 62 Temp 97.8 °F (36.6 °C) Resp 18 Ht 5' 7\" (1.702 m) Wt 160 lb 0.9 oz (72.6 kg) SpO2 95% BMI 25.07 kg/m² Temp (24hrs), Av.6 °F (36.4 °C), Min:97 °F (36.1 °C), Max:98 °F (36.7 °C) Last 24hr Input/Output: 
 
Intake/Output Summary (Last 24 hours) at 2019 5552 Last data filed at 2019 5422 Gross per 24 hour Intake 1870 ml Output  Net 1870 ml  
  
 
EKG:  SR w/ 1st deg block Oxygen: RA 
 
CXR:   
CXR Results  (Last 48 hours) 05/10/19 1523  XR CHEST PA LAT Final result Impression:  IMPRESSION: No acute findings. Narrative:  History: Preop heart surgery, history of asthma and smoking. 2 views of the chest demonstrate atherosclerosis of the aorta. Heart size is  
unremarkable lungs are hyperinflated but clear. There are no effusions. There  
are degenerative changes of the spine. Admission Weight: Last Weight Weight: 161 lb (73 kg) Weight: 160 lb 0.9 oz (72.6 kg) EXAM: 
   
Lungs:   Clear to auscultation bilaterally. Heart:  Regular rate and rhythm, S1, S2 normal, no murmur, click, rub or gallop. Abdomen:   Soft, non-tender. Bowel sounds normal. No masses,  No organomegaly. Extremities:  No edema. PPP. Neurologic:  Gross motor and sensory apparatus intact. Activity: ad chloe Diet:  Cardiac diet Lab Data Reviewed:  
Recent Labs 19 
1433 WBC 12.1* HGB 13.6 HCT 41.5  CREA 0.80 INR 1.0 Cardiac Testing:  
 
TTE:  Completed 19,  Formal report pending . Assessment:  
 
Active Problems: 
  CAD (coronary artery disease) (2019) Plan/Recommendations/Medical Decision Makin. CAD: significant LM and RCA dx. Tentatively scheduled for CABG, 5/15 w/ Fiser. Preop workup and education started. Need to work thru GI issues prior to surgery. Obtain TTE. On asa, statin, BB. Needs ABG, Type & Cross, PA/lat. 2. Dysphagia/Weight loss/new onset GERD sx:  GI Consult. C/A/P CTA completed, discuss w/ GI. Protonix. 3. Hx of R carotid endarectomy: Repeat carotid dopplers ok. 4. Hx of PVD,(L SFA BAP 2007, R SFA atherectomy 3/2018): Followed by Dr. Luis Anand. ABIs from April acceptable. L leg > R leg (1.08 vs. 0.95). 5. COPD/several month hx of productive cough:  PRN duo-nebs, steroid nebs. Zrytec/Flonase. Wife reports had stopped Singulair recently d/t concern for worsening diarrhea. No obvious source on Chest CT for cough, may be GI related. PFTs acceptable from April (FEV1 1.74--65% predicted). consult Pulmonary. 6. HLD:  Cont statin. 7. BPH:  On flomax. Has hx of retention/hesitancy post procedure. Will increase flomax dosage, add proscar. UA  negative. 8. SR w/ 1st deg block:  Stable, cont BB. 9. Hypoattenuation in the distal pancreatic body, tail, and uncinate process with scattered enlarged mesenteric lymph nodes:  Discuss w/ GI. Will obtain MRI For further clarity.    
 
Signed By: WAGNER Pool

## 2019-05-11 NOTE — PROGRESS NOTES
1600 Bedside shift change report given to Martin Bourne RN  (oncoming nurse) by Wing Pacheco (offgoing nurse). Report included the following information SBAR, Kardex, Procedure Summary, Intake/Output, MAR, Recent Results and Med Rec Status.

## 2019-05-11 NOTE — PROGRESS NOTES
1930  Bedside shift change report given to Stefania Joy (oncoming nurse) by Johnny Jauregui (offgoing nurse). Report included the following information SBAR, Kardex, Procedure Summary, Intake/Output and MAR.  
 
0730  Bedside shift change report given to Celi Barakat (oncoming nurse) by Stefania Joy (offgoing nurse). Report included the following information SBAR, Kardex, Procedure Summary, Intake/Output and MAR.

## 2019-05-11 NOTE — PROGRESS NOTES
Bedside shift change report given to [address] (oncoming nurse) by Thuan Brown (offgoing nurse). Report included the following information SBAR.

## 2019-05-11 NOTE — PROGRESS NOTES
Problem: Falls - Risk of 
Goal: *Absence of Falls Description Document Tia Manus Fall Risk and appropriate interventions in the flowsheet. Outcome: Progressing Towards Goal 
  
Problem: Patient Education: Go to Patient Education Activity Goal: Patient/Family Education Outcome: Progressing Towards Goal

## 2019-05-12 PROCEDURE — 94640 AIRWAY INHALATION TREATMENT: CPT

## 2019-05-12 PROCEDURE — 74011636637 HC RX REV CODE- 636/637: Performed by: INTERNAL MEDICINE

## 2019-05-12 PROCEDURE — 74011000250 HC RX REV CODE- 250: Performed by: NURSE PRACTITIONER

## 2019-05-12 PROCEDURE — 74011250637 HC RX REV CODE- 250/637: Performed by: NURSE PRACTITIONER

## 2019-05-12 PROCEDURE — 65660000000 HC RM CCU STEPDOWN

## 2019-05-12 PROCEDURE — 74011250637 HC RX REV CODE- 250/637: Performed by: INTERNAL MEDICINE

## 2019-05-12 RX ADMIN — MONTELUKAST SODIUM 10 MG: 10 TABLET, FILM COATED ORAL at 21:35

## 2019-05-12 RX ADMIN — CHLORHEXIDINE GLUCONATE 15 ML: 1.2 RINSE ORAL at 21:39

## 2019-05-12 RX ADMIN — ARFORMOTEROL TARTRATE 15 MCG: 15 SOLUTION RESPIRATORY (INHALATION) at 20:30

## 2019-05-12 RX ADMIN — ATORVASTATIN CALCIUM 40 MG: 40 TABLET, FILM COATED ORAL at 08:10

## 2019-05-12 RX ADMIN — Medication 10 ML: at 14:00

## 2019-05-12 RX ADMIN — ARFORMOTEROL TARTRATE 15 MCG: 15 SOLUTION RESPIRATORY (INHALATION) at 07:51

## 2019-05-12 RX ADMIN — Medication 10 ML: at 21:34

## 2019-05-12 RX ADMIN — PANTOPRAZOLE SODIUM 40 MG: 40 TABLET, DELAYED RELEASE ORAL at 07:03

## 2019-05-12 RX ADMIN — CETIRIZINE HYDROCHLORIDE 10 MG: 10 TABLET, FILM COATED ORAL at 08:09

## 2019-05-12 RX ADMIN — BUDESONIDE 500 MCG: 0.5 INHALANT RESPIRATORY (INHALATION) at 20:30

## 2019-05-12 RX ADMIN — ASPIRIN 81 MG 81 MG: 81 TABLET ORAL at 08:09

## 2019-05-12 RX ADMIN — FLUTICASONE PROPIONATE 2 SPRAY: 50 SPRAY, METERED NASAL at 08:12

## 2019-05-12 RX ADMIN — Medication 10 ML: at 07:04

## 2019-05-12 RX ADMIN — FINASTERIDE 5 MG: 5 TABLET, FILM COATED ORAL at 08:18

## 2019-05-12 RX ADMIN — CHLORHEXIDINE GLUCONATE 15 ML: 1.2 RINSE ORAL at 08:11

## 2019-05-12 RX ADMIN — METOPROLOL TARTRATE 12.5 MG: 25 TABLET ORAL at 21:35

## 2019-05-12 RX ADMIN — TAMSULOSIN HYDROCHLORIDE 0.8 MG: 0.4 CAPSULE ORAL at 08:09

## 2019-05-12 RX ADMIN — PREDNISONE 40 MG: 20 TABLET ORAL at 14:08

## 2019-05-12 RX ADMIN — BUDESONIDE 500 MCG: 0.5 INHALANT RESPIRATORY (INHALATION) at 07:51

## 2019-05-12 NOTE — PROGRESS NOTES
1930 Bedside shift change report given to Jorge Johnston (oncoming nurse) by Hector Calle (offgoing nurse). Report included the following information SBAR, Kardex, Procedure Summary, Intake/Output, MAR and Recent Results. 0730 Bedside shift change report given to Hector Calle (oncoming nurse) by Jorge Johnston (offgoing nurse). Report included the following information SBAR, Kardex, Intake/Output, MAR and Recent Results.

## 2019-05-12 NOTE — PROGRESS NOTES
[facility] Pre-operative  Note Admit Date: 2019 Subjective:  
Pt seen with Dr. Edis Ramos No chest pain or SOB  Over night Patient has lost anisa without dieting. GI workup in question:  
GI did not see patient yesterday, not sure what testing is scheduled for tomorrow Objective:  
 
Visit Vitals /46 (BP 1 Location: Left arm) Pulse 66 Temp 97.6 °F (36.4 °C) Resp 16 Ht 5' 7\" (1.702 m) Wt 160 lb 7.9 oz (72.8 kg) SpO2 92% BMI 25.14 kg/m² Temp (24hrs), Av.8 °F (36.6 °C), Min:97.4 °F (36.3 °C), Max:98.4 °F (36.9 °C) Last 24hr Input/Output: 
 
Intake/Output Summary (Last 24 hours) at 2019 0831 Last data filed at 2019 0010 Gross per 24 hour Intake 840 ml Output  Net 840 ml  
  
 
EKG:  SR w/ 1st deg block Oxygen: RA 
 
CXR:   
CXR Results  (Last 48 hours) 05/10/19 1523  XR CHEST PA LAT Final result Impression:  IMPRESSION: No acute findings. Narrative:  History: Preop heart surgery, history of asthma and smoking. 2 views of the chest demonstrate atherosclerosis of the aorta. Heart size is  
unremarkable lungs are hyperinflated but clear. There are no effusions. There  
are degenerative changes of the spine. Admission Weight: Last Weight Weight: 161 lb (73 kg) Weight: 160 lb 7.9 oz (72.8 kg) EXAM: 
   
Lungs:   Clear to auscultation bilaterally. Heart:  Regular rate and rhythm, S1, S2 normal, no murmur, click, rub or gallop. Abdomen:   Soft, non-tender. Bowel sounds normal. No masses,  No organomegaly. Extremities:  No edema. PPP. Neurologic:  Gross motor and sensory apparatus intact. Activity: ad chloe Diet:  Cardiac diet Lab Data Reviewed:  
Recent Labs 19 
1433 WBC 12.1* HGB 13.6 HCT 41.5  CREA 0.80 INR 1.0 Cardiac Testing:  
 
TTE:  Completed 19,  Formal report pending . Assessment:  
 
Active Problems: CAD (coronary artery disease) (2019) Plan/Recommendations/Medical Decision Makin. CAD: significant LM and RCA dx. Tentatively scheduled for CABG, 5/15 w/ Fiser. Preop workup and education started. Need to work thru GI issues prior to surgery. Obtain TTE. On asa, statin, BB. Needs ABG, Type & Cross, PA/lat. 2. Dysphagia/Weight loss/new onset GERD sx:  GI Consult. C/A/P CTA completed, discuss w/ GI. Protonix. 3. Hx of R carotid endarectomy: Repeat carotid dopplers ok. 4. Hx of PVD,(L SFA BAP 2007, R SFA atherectomy 3/2018): Followed by Dr. Gonzalez Joseph. ABIs from April acceptable. L leg > R leg (1.08 vs. 0.95). 5. COPD/several month hx of productive cough:  PRN duo-nebs, steroid nebs. Zrytec/Flonase. Wife reports had stopped Singulair recently d/t concern for worsening diarrhea. No obvious source on Chest CT for cough, may be GI related. PFTs acceptable from April (FEV1 1.74--65% predicted). consult Pulmonary. 6. HLD:  Cont statin. 7. BPH:  On flomax. Has hx of retention/hesitancy post procedure. Will increase flomax dosage, add proscar. UA  negative. 8. SR w/ 1st deg block:  Stable, cont BB. 9. Hypoattenuation in the distal pancreatic body, tail, and uncinate process with scattered enlarged mesenteric lymph nodes:  Discuss w/ GI. Will obtain MRI For further clarity.    
 
Signed By: WAGNER Chu

## 2019-05-12 NOTE — PROGRESS NOTES
Problem: Falls - Risk of 
Goal: *Absence of Falls Description Document Iveth Arriaga Fall Risk and appropriate interventions in the flowsheet.  
Outcome: Progressing Towards Goal

## 2019-05-12 NOTE — PROGRESS NOTES
GI PROGRESS NOTE 
 
NAME:             Getachew Crowder :              1945 MRN:              [id number] Admit Date:     2019 Todays Date:  2019 Subjective:  
     
  Notes some episodic dysphagia. Has a chronic cough. Diarrhea has resolved. Scheduled for cardiac surgery on Wednesday. Keena Srivastava Medications-reviewed Current Facility-Administered Medications Medication Dose Route Frequency  tamsulosin (FLOMAX) capsule 0.8 mg  0.8 mg Oral DAILY  finasteride (PROSCAR) tablet 5 mg  5 mg Oral DAILY  sodium chloride (NS) flush 5-40 mL  5-40 mL IntraVENous Q8H  
 sodium chloride (NS) flush 5-40 mL  5-40 mL IntraVENous PRN  
 simethicone (MYLICON) 62IJ/2.1TA oral drops 80 mg  1.2 mL Oral Multiple  predniSONE (DELTASONE) tablet 40 mg  40 mg Oral DAILY WITH LUNCH  
 montelukast (SINGULAIR) tablet 10 mg  10 mg Oral QHS  sodium chloride (NS) flush 5-40 mL  5-40 mL IntraVENous Q8H  
 sodium chloride (NS) flush 5-40 mL  5-40 mL IntraVENous PRN  
 aspirin chewable tablet 81 mg  81 mg Oral DAILY  sodium phosphate (FLEET'S) enema 1 Enema  1 Enema Rectal PRN  chlorhexidine (PERIDEX) 0.12 % mouthwash 15 mL  15 mL Oral Q12H  
 atorvastatin (LIPITOR) tablet 40 mg  40 mg Oral DAILY  fluticasone propionate (FLONASE) 50 mcg/actuation nasal spray 2 Spray  2 Spray Both Nostrils DAILY  pantoprazole (PROTONIX) tablet 40 mg  40 mg Oral DAILY  arformoterol (BROVANA) neb solution 15 mcg  15 mcg Nebulization BID RT And  
 budesonide (PULMICORT) 500 mcg/2 ml nebulizer suspension  500 mcg Nebulization BID RT  
 albuterol-ipratropium (DUO-NEB) 2.5 MG-0.5 MG/3 ML  3 mL Nebulization Q6H PRN  
 cetirizine (ZYRTEC) tablet 10 mg  10 mg Oral DAILY  metoprolol tartrate (LOPRESSOR) tablet 12.5 mg  12.5 mg Oral Q12H  
 nicotine (NICODERM CQ) 21 mg/24 hr patch 1 Patch  1 Patch TransDERmal DAILY  ALPRAZolam (XANAX) tablet 0.5 mg  0.5 mg Oral TID PRN Objective: Patient Vitals for the past 8 hrs: 
 BP Temp Pulse Resp SpO2  
05/12/19 1515 119/41 97.4 °F (36.3 °C) 62 16 93 % 05/12/19 1057 154/62 97.5 °F (36.4 °C) 64 16 94 % 05/12/19 0806 119/46 97.6 °F (36.4 °C) 66 16 92 % No intake/output data recorded. 05/10 1901 - 05/12 0700 In: 2610 [P.O.:1610; I.V.:1000] Out: - EXAM:   
 NEURO-a&o HEENT-wnl LUNGS-clear COR-regular rate and rhythym ABD-soft , no tenderness, no rebound, good bs EXT-no edema Data Review No results for input(s): WBC, HGB, HCT, PLT, HGBEXT, HCTEXT, PLTEXT in the last 72 hours. No results for input(s): NA, K, CL, CO2, BUN, CREA, GLU, PHOS, CA in the last 72 hours. No results for input(s): SGOT, GPT, AP, TBIL, TP, ALB, GLOB, GGT, AML, LPSE in the last 72 hours. No lab exists for component: AMYP, HLPSE Assessment: 1. No pancreatic abnormality on MRI 2. Chronic cough of unclear cause-May be some component of silent GERD 3.   Episodic solid food dysphagia 4. Diarrhea resolved 5. ASCVD-For surgery this week Active Problems: 
  CAD (coronary artery disease) (5/9/2019) Plan: 1. Consider EGD tomorrow to further evaluate

## 2019-05-13 ENCOUNTER — ANESTHESIA EVENT (OUTPATIENT)
Dept: CARDIOTHORACIC SURGERY | Age: 74
DRG: 234 | End: 2019-05-13
Payer: MEDICARE

## 2019-05-13 ENCOUNTER — HOME HEALTH ADMISSION (OUTPATIENT)
Dept: HOME HEALTH SERVICES | Facility: HOME HEALTH | Age: 74
End: 2019-05-13
Payer: MEDICARE

## 2019-05-13 ENCOUNTER — APPOINTMENT (OUTPATIENT)
Dept: GENERAL RADIOLOGY | Age: 74
DRG: 234 | End: 2019-05-13
Attending: NURSE PRACTITIONER
Payer: MEDICARE

## 2019-05-13 LAB
ALBUMIN SERPL-MCNC: 3.4 G/DL (ref 3.5–5)
ALBUMIN/GLOB SERPL: 0.9 {RATIO} (ref 1.1–2.2)
ALP SERPL-CCNC: 103 U/L (ref 45–117)
ALT SERPL-CCNC: 40 U/L (ref 12–78)
ANION GAP SERPL CALC-SCNC: 5 MMOL/L (ref 5–15)
ARTERIAL PATENCY WRIST A: YES
AST SERPL-CCNC: 25 U/L (ref 15–37)
BASE DEFICIT BLD-SCNC: 1 MMOL/L
BDY SITE: ABNORMAL
BILIRUB SERPL-MCNC: 0.3 MG/DL (ref 0.2–1)
BUN SERPL-MCNC: 18 MG/DL (ref 6–20)
BUN/CREAT SERPL: 18 (ref 12–20)
CALCIUM SERPL-MCNC: 9.3 MG/DL (ref 8.5–10.1)
CHLORIDE SERPL-SCNC: 110 MMOL/L (ref 97–108)
CO2 SERPL-SCNC: 29 MMOL/L (ref 21–32)
CREAT SERPL-MCNC: 1 MG/DL (ref 0.7–1.3)
ERYTHROCYTE [DISTWIDTH] IN BLOOD BY AUTOMATED COUNT: 12.6 % (ref 11.5–14.5)
GAS FLOW.O2 O2 DELIVERY SYS: ABNORMAL L/MIN
GLOBULIN SER CALC-MCNC: 3.9 G/DL (ref 2–4)
GLUCOSE SERPL-MCNC: 96 MG/DL (ref 65–100)
HCO3 BLD-SCNC: 24.2 MMOL/L (ref 22–26)
HCT VFR BLD AUTO: 45.3 % (ref 36.6–50.3)
HGB BLD-MCNC: 14.7 G/DL (ref 12.1–17)
MAGNESIUM SERPL-MCNC: 2.4 MG/DL (ref 1.6–2.4)
MCH RBC QN AUTO: 30.3 PG (ref 26–34)
MCHC RBC AUTO-ENTMCNC: 32.5 G/DL (ref 30–36.5)
MCV RBC AUTO: 93.4 FL (ref 80–99)
NRBC # BLD: 0 K/UL (ref 0–0.01)
NRBC BLD-RTO: 0 PER 100 WBC
PCO2 BLD: 40.2 MMHG (ref 35–45)
PH BLD: 7.39 [PH] (ref 7.35–7.45)
PLATELET # BLD AUTO: 330 K/UL (ref 150–400)
PMV BLD AUTO: 10.2 FL (ref 8.9–12.9)
PO2 BLD: 66 MMHG (ref 80–100)
POTASSIUM SERPL-SCNC: 3.4 MMOL/L (ref 3.5–5.1)
PROT SERPL-MCNC: 7.3 G/DL (ref 6.4–8.2)
RBC # BLD AUTO: 4.85 M/UL (ref 4.1–5.7)
SAO2 % BLD: 93 % (ref 92–97)
SODIUM SERPL-SCNC: 144 MMOL/L (ref 136–145)
SPECIMEN TYPE: ABNORMAL
TOTAL RESP. RATE, ITRR: 16
WBC # BLD AUTO: 13.7 K/UL (ref 4.1–11.1)

## 2019-05-13 PROCEDURE — 74011250637 HC RX REV CODE- 250/637: Performed by: INTERNAL MEDICINE

## 2019-05-13 PROCEDURE — 80053 COMPREHEN METABOLIC PANEL: CPT

## 2019-05-13 PROCEDURE — 65660000000 HC RM CCU STEPDOWN

## 2019-05-13 PROCEDURE — 74011000250 HC RX REV CODE- 250: Performed by: NURSE PRACTITIONER

## 2019-05-13 PROCEDURE — 82803 BLOOD GASES ANY COMBINATION: CPT

## 2019-05-13 PROCEDURE — 36415 COLL VENOUS BLD VENIPUNCTURE: CPT

## 2019-05-13 PROCEDURE — 74011250637 HC RX REV CODE- 250/637: Performed by: NURSE PRACTITIONER

## 2019-05-13 PROCEDURE — 94640 AIRWAY INHALATION TREATMENT: CPT

## 2019-05-13 PROCEDURE — 83735 ASSAY OF MAGNESIUM: CPT

## 2019-05-13 PROCEDURE — 86923 COMPATIBILITY TEST ELECTRIC: CPT

## 2019-05-13 PROCEDURE — 85027 COMPLETE CBC AUTOMATED: CPT

## 2019-05-13 PROCEDURE — 86900 BLOOD TYPING SEROLOGIC ABO: CPT

## 2019-05-13 PROCEDURE — 74011636637 HC RX REV CODE- 636/637: Performed by: INTERNAL MEDICINE

## 2019-05-13 PROCEDURE — 36600 WITHDRAWAL OF ARTERIAL BLOOD: CPT

## 2019-05-13 PROCEDURE — 74241 XR UPPER GI SERIES W KUB: CPT

## 2019-05-13 RX ORDER — GUAIFENESIN 600 MG/1
600 TABLET, EXTENDED RELEASE ORAL EVERY 12 HOURS
Status: DISCONTINUED | OUTPATIENT
Start: 2019-05-13 | End: 2019-05-18

## 2019-05-13 RX ORDER — MIDAZOLAM HYDROCHLORIDE 1 MG/ML
0.5 INJECTION, SOLUTION INTRAMUSCULAR; INTRAVENOUS
Status: CANCELLED | OUTPATIENT
Start: 2019-05-13

## 2019-05-13 RX ORDER — SODIUM CHLORIDE 0.9 % (FLUSH) 0.9 %
5-40 SYRINGE (ML) INJECTION AS NEEDED
Status: CANCELLED | OUTPATIENT
Start: 2019-05-13

## 2019-05-13 RX ORDER — MORPHINE SULFATE 10 MG/ML
2 INJECTION, SOLUTION INTRAMUSCULAR; INTRAVENOUS
Status: CANCELLED | OUTPATIENT
Start: 2019-05-13

## 2019-05-13 RX ORDER — ONDANSETRON 2 MG/ML
4 INJECTION INTRAMUSCULAR; INTRAVENOUS AS NEEDED
Status: CANCELLED | OUTPATIENT
Start: 2019-05-13

## 2019-05-13 RX ORDER — HEPARIN SOD,PORCINE/0.9 % NACL 30K/1000ML
50-1000 INTRAVENOUS SOLUTION INTRAVENOUS AS NEEDED
Status: DISCONTINUED | OUTPATIENT
Start: 2019-05-14 | End: 2019-05-14 | Stop reason: HOSPADM

## 2019-05-13 RX ORDER — OXYCODONE HYDROCHLORIDE 5 MG/1
5 TABLET ORAL AS NEEDED
Status: CANCELLED | OUTPATIENT
Start: 2019-05-13

## 2019-05-13 RX ORDER — MAGNESIUM SULFATE HEPTAHYDRATE 40 MG/ML
2 INJECTION, SOLUTION INTRAVENOUS ONCE
Status: DISCONTINUED | OUTPATIENT
Start: 2019-05-14 | End: 2019-05-14 | Stop reason: HOSPADM

## 2019-05-13 RX ORDER — PHENYLEPHRINE 10 MG/250 ML(40 MCG/ML)IN 0.9 % SOD.CHLORIDE INTRAVENOUS
10-100
Status: DISCONTINUED | OUTPATIENT
Start: 2019-05-14 | End: 2019-05-14 | Stop reason: HOSPADM

## 2019-05-13 RX ORDER — DIPHENHYDRAMINE HYDROCHLORIDE 50 MG/ML
12.5 INJECTION, SOLUTION INTRAMUSCULAR; INTRAVENOUS AS NEEDED
Status: CANCELLED | OUTPATIENT
Start: 2019-05-13 | End: 2019-05-13

## 2019-05-13 RX ORDER — BENZONATATE 100 MG/1
100 CAPSULE ORAL 3 TIMES DAILY
Status: DISCONTINUED | OUTPATIENT
Start: 2019-05-13 | End: 2019-05-20 | Stop reason: HOSPADM

## 2019-05-13 RX ORDER — DOBUTAMINE HYDROCHLORIDE 200 MG/100ML
0-10 INJECTION INTRAVENOUS
Status: DISCONTINUED | OUTPATIENT
Start: 2019-05-14 | End: 2019-05-15

## 2019-05-13 RX ORDER — ALBUMIN HUMAN 50 G/1000ML
25 SOLUTION INTRAVENOUS ONCE
Status: DISCONTINUED | OUTPATIENT
Start: 2019-05-14 | End: 2019-05-14 | Stop reason: HOSPADM

## 2019-05-13 RX ORDER — NITROGLYCERIN 20 MG/100ML
16.5 INJECTION INTRAVENOUS CONTINUOUS
Status: DISCONTINUED | OUTPATIENT
Start: 2019-05-14 | End: 2019-05-15

## 2019-05-13 RX ORDER — POTASSIUM CHLORIDE 29.8 MG/ML
20 INJECTION INTRAVENOUS ONCE
Status: DISCONTINUED | OUTPATIENT
Start: 2019-05-14 | End: 2019-05-14 | Stop reason: HOSPADM

## 2019-05-13 RX ORDER — DOPAMINE HYDROCHLORIDE 320 MG/100ML
5-20 INJECTION, SOLUTION INTRAVENOUS
Status: DISCONTINUED | OUTPATIENT
Start: 2019-05-14 | End: 2019-05-15

## 2019-05-13 RX ORDER — FENTANYL CITRATE 50 UG/ML
25 INJECTION, SOLUTION INTRAMUSCULAR; INTRAVENOUS
Status: CANCELLED | OUTPATIENT
Start: 2019-05-13

## 2019-05-13 RX ORDER — PROTAMINE SULFATE 10 MG/ML
500 INJECTION, SOLUTION INTRAVENOUS ONCE
Status: DISCONTINUED | OUTPATIENT
Start: 2019-05-14 | End: 2019-05-14 | Stop reason: HOSPADM

## 2019-05-13 RX ORDER — HYDROMORPHONE HYDROCHLORIDE 1 MG/ML
0.2 INJECTION, SOLUTION INTRAMUSCULAR; INTRAVENOUS; SUBCUTANEOUS
Status: CANCELLED | OUTPATIENT
Start: 2019-05-13

## 2019-05-13 RX ORDER — SODIUM CHLORIDE 0.9 % (FLUSH) 0.9 %
5-40 SYRINGE (ML) INJECTION EVERY 8 HOURS
Status: CANCELLED | OUTPATIENT
Start: 2019-05-13

## 2019-05-13 RX ORDER — SODIUM CHLORIDE, SODIUM LACTATE, POTASSIUM CHLORIDE, CALCIUM CHLORIDE 600; 310; 30; 20 MG/100ML; MG/100ML; MG/100ML; MG/100ML
125 INJECTION, SOLUTION INTRAVENOUS CONTINUOUS
Status: CANCELLED | OUTPATIENT
Start: 2019-05-13

## 2019-05-13 RX ADMIN — Medication 10 ML: at 16:28

## 2019-05-13 RX ADMIN — METOPROLOL TARTRATE 12.5 MG: 25 TABLET ORAL at 21:20

## 2019-05-13 RX ADMIN — Medication 10 ML: at 21:21

## 2019-05-13 RX ADMIN — CHLORHEXIDINE GLUCONATE 15 ML: 1.2 RINSE ORAL at 21:20

## 2019-05-13 RX ADMIN — METOPROLOL TARTRATE 12.5 MG: 25 TABLET ORAL at 09:32

## 2019-05-13 RX ADMIN — ATORVASTATIN CALCIUM 40 MG: 40 TABLET, FILM COATED ORAL at 09:32

## 2019-05-13 RX ADMIN — PANTOPRAZOLE SODIUM 40 MG: 40 TABLET, DELAYED RELEASE ORAL at 07:08

## 2019-05-13 RX ADMIN — BENZONATATE 100 MG: 100 CAPSULE ORAL at 21:20

## 2019-05-13 RX ADMIN — BUDESONIDE 500 MCG: 0.5 INHALANT RESPIRATORY (INHALATION) at 20:52

## 2019-05-13 RX ADMIN — Medication 10 ML: at 07:08

## 2019-05-13 RX ADMIN — GUAIFENESIN 600 MG: 600 TABLET, EXTENDED RELEASE ORAL at 16:27

## 2019-05-13 RX ADMIN — ARFORMOTEROL TARTRATE 15 MCG: 15 SOLUTION RESPIRATORY (INHALATION) at 09:15

## 2019-05-13 RX ADMIN — CHLORHEXIDINE GLUCONATE 15 ML: 1.2 RINSE ORAL at 09:31

## 2019-05-13 RX ADMIN — CETIRIZINE HYDROCHLORIDE 10 MG: 10 TABLET, FILM COATED ORAL at 09:32

## 2019-05-13 RX ADMIN — ASPIRIN 81 MG 81 MG: 81 TABLET ORAL at 09:32

## 2019-05-13 RX ADMIN — PREDNISONE 40 MG: 20 TABLET ORAL at 16:27

## 2019-05-13 RX ADMIN — FLUTICASONE PROPIONATE 2 SPRAY: 50 SPRAY, METERED NASAL at 09:33

## 2019-05-13 RX ADMIN — TAMSULOSIN HYDROCHLORIDE 0.8 MG: 0.4 CAPSULE ORAL at 09:32

## 2019-05-13 RX ADMIN — BENZONATATE 100 MG: 100 CAPSULE ORAL at 16:27

## 2019-05-13 RX ADMIN — FINASTERIDE 5 MG: 5 TABLET, FILM COATED ORAL at 09:32

## 2019-05-13 RX ADMIN — MONTELUKAST SODIUM 10 MG: 10 TABLET, FILM COATED ORAL at 21:20

## 2019-05-13 RX ADMIN — ARFORMOTEROL TARTRATE 15 MCG: 15 SOLUTION RESPIRATORY (INHALATION) at 20:52

## 2019-05-13 RX ADMIN — GUAIFENESIN 600 MG: 600 TABLET, EXTENDED RELEASE ORAL at 21:20

## 2019-05-13 RX ADMIN — BUDESONIDE 500 MCG: 0.5 INHALANT RESPIRATORY (INHALATION) at 09:15

## 2019-05-13 NOTE — PROGRESS NOTES
1930  Bedside shift change report [facility] by Chel Arora (offgoing nurse). Report included the following information SBAR, Kardex, Procedure Summary, Intake/Output, MAR and Recent Results. 0730  Bedside shift change report given to [address] [place] [address]  ([facility] by Thierry Arceo (offgoing nurse). Report included the following information SBAR, Kardex and MAR.

## 2019-05-13 NOTE — PROGRESS NOTES
[facility] Pre-operative  Note Admit Date: 2019 Subjective:  
Pt seen with Dr. Hiro Reaves. Resting in bed. Denies CP/SOB. On RA. Cough slightly better since starting prednisone. Objective:  
 
Visit Vitals /53 (BP 1 Location: Left arm, BP Patient Position: At rest) Pulse (!) 58 Temp 97.9 °F (36.6 °C) Resp 16 Ht 5' 7\" (1.702 m) Wt 160 lb 15 oz (73 kg) SpO2 98% BMI 25.21 kg/m² Temp (24hrs), Av.7 °F (36.5 °C), Min:97.4 °F (36.3 °C), Max:98 °F (36.7 °C) Last 24hr Input/Output: 
 
Intake/Output Summary (Last 24 hours) at 2019 0755 Last data filed at 2019 0739 Gross per 24 hour Intake 720 ml Output  Net 720 ml  
  
 
EKG:  SR w/ 1st deg block Oxygen: RA 
 
CXR:   
CXR Results  (Last 48 hours) None Admission Weight: Last Weight Weight: 161 lb (73 kg) Weight: 160 lb 15 oz (73 kg) EXAM: 
   
Lungs:   Clear to auscultation bilaterally. Heart:  Regular rate and rhythm, S1, S2 normal, no murmur, click, rub or gallop. Abdomen:   Soft, non-tender. Bowel sounds normal. No masses,  No organomegaly. Extremities:  No edema. PPP. Neurologic:  Gross motor and sensory apparatus intact. Activity: ad chloe Diet:  Cardiac diet Lab Data Reviewed:  
No results for input(s): WBC, HGB, HCT, PLT, CREA, INR, HGBEXT, HCTEXT, PLTEXT, HGBEXT, HCTEXT, PLTEXT in the last 72 hours. No lab exists for component: INREXT, INREXT Cardiac Testing:  
 
TTE:  19 Left Ventricle Normal cavity size, wall thickness, systolic function (ejection fraction normal) and diastolic function. The estimated ejection fraction is 56 - 60%. Left Atrium Normal cavity size. No atrial septal defect present. No patent foramen ovale visualized. Right Ventricle Normal cavity size, wall thickness and global systolic function. Right Atrium Normal cavity size. Aortic Valve Normal valve structure, no stenosis and no regurgitation. Mitral Valve No stenosis and no regurgitation. Mitral valve thickening. There is mild anterior leaflet thickening. There is posterior leaflet thickening. Mitral annular calcification. Tricuspid Valve Normal valve structure and no stenosis. Tricuspid regurgitation is inadequate for estimation of right ventricular systolic pressure. Pulmonic Valve Normal valve structure, no stenosis and no regurgitation. Aorta Normal aortic root. IVC/Hepatic Veins Normal structure. Normal central venous pressure (0-5 mmHg); IVC diameter is less than 21 mm and collapses more than 50% with respiration. Pericardium No evidence of pericardial effusion. Assessment:  
 
Active Problems: 
  CAD (coronary artery disease) (2019) Plan/Recommendations/Medical Decision Makin. CAD: significant LM and RCA dx. Tentatively scheduled for CABG, 5/15 w/ Fiser. Preop workup and education started. Need to work thru GI issues prior to surgery. On asa, statin, BB. Needs ABG, Type & Cross. 2. Dysphagia/Weight loss/new onset GERD sx:  GI following. C/A/P CTA & MRI completed--formal report pending, prelim read does not reflect any acute pancreas issue. Cont Protonix. Suggesting EGD, concerned about tight LM disease and risk of EGD. Could be done postop, consider upper GI series today. 3. Hx of R carotid endarectomy: Repeat carotid dopplers ok. 4. Hx of PVD,(L SFA BAP 2007, R SFA atherectomy 3/2018): Followed by Dr. Mary Jovel. ABIs from April acceptable. L leg > R leg (1.08 vs. 0.95). 5. COPD/several month hx of productive cough:  PRN duo-nebs, steroid nebs. Zrytec/Flonase/Singulair. No obvious source on Chest CT for cough, may be GI related. PFTs acceptable from April (FEV1 1.74--65% predicted). Pulmonary consulted, started on prednisone 40 mg PO daily -- if cough related to allergies, should see quick improvement. 6. HLD:  Cont statin. 7. BPH:  On flomax. Has hx of retention/hesitancy post procedure. Will increase flomax dosage, add proscar. UA 5/9 negative. 8. SR w/ 1st deg block:  Stable, cont BB. 9. Hypoattenuation in the distal pancreatic body, tail, and uncinate process with scattered enlarged mesenteric lymph nodes:  Discuss w/ GI.  MRI completed, prelim read doesn't suggest any pancreas issue, formal report pending. Will obtain surgical consents today. Signed By: Gwendolyn Carlos NP Saw patient, agree with above Risk of morbidity and mortality - high Medical decision making - high complexity 1. CAD: significant LM and RCA dx. Tentatively scheduled for CABG,  
2. Dysphagia/Weight loss/new onset GERD sx:  GI following. C/A/P CTA & MRI 3. Hx of R carotid endarectomy: Repeat carotid dopplers ok. 4. Hx of PVD,(L SFA BAP 1/2007, R SFA atherectomy 3/2018): Followed by Dr. Josef Gutierrez 5. COPD/several month hx of productive cough:  PRN duo-nebs, steroid nebs. Zrytec/Flonase/Singulair. No obvious source on Chest CT for cough,     
6. HLD:    statin. 7. BPH:   Flomax.   
8. SR w/ 1st deg block:  Stable,

## 2019-05-13 NOTE — PROGRESS NOTES
A Spiritual Care Partner Volunteer visited patient in Rm 451 on 5/13/2019. Documented by: 
Chaplain Grider MDiv, [place], [address] [place] [address] ([id number])

## 2019-05-13 NOTE — PROGRESS NOTES
0730:  Bedside shift change report given to Duyen (oncoming nurse) by Micheal Nicolas (offgoing nurse). Report included the following information SBAR, Kardex, MAR and Recent Results. 1050:  Orders placed by GI for patient to have an upper GI series. Pt aware that he needs to remain NPO until after the procedure. 1338:  Pt off unit for ordered procedure 1501:  Pt back to unit from procedure. 1930:  Bedside shift change report given to [facility] (oncoming nurse) by [address] (offgoing nurse). Report included the following information SBAR, Kardex, MAR and Recent Results. Problem: Falls - Risk of 
Goal: *Absence of Falls Description Document Gibson Tabares Fall Risk and appropriate interventions in the flowsheet. Outcome: Progressing Towards Goal 
Note:  
Pt up ad chloe and remains free of falls Problem: Patient Education: Go to Patient Education Activity Goal: Patient/Family Education Outcome: Progressing Towards Goal 
Note:  
Pt calls out for assistance when needed Problem: Pressure Injury - Risk of 
Goal: *Prevention of pressure injury Description Document Gian Scale and appropriate interventions in the flowsheet. Outcome: Progressing Towards Goal 
Note:  
Pt repositions self frequently Problem: Patient Education: Go to Patient Education Activity Goal: Patient/Family Education Outcome: Progressing Towards Goal

## 2019-05-13 NOTE — PROGRESS NOTES
Reason for Admission: Patient is to undergo CABG 5/15 RRAT Score: 8 Plan for utilizing home health: CM discussed home health following CABG- Freedom of Choice discussed, patient's first choice would be [facility], second choice AT [facility] [address] Current Advanced Directive/Advance Care Plan: Full code, not on file Likelihood of Readmission:  Low Transition of Care Plan:  Anticipate home with home health The CM met with the patient and spouse at bedside in order to introduce the role of CM and assess for patient needs. The patient lives at home with his wife- verified demographics and insurance information. Spouse can be reached at ([phone]). The patient endorses being independent with ADLs and mobility prior to hospitalization. The patient's family will transport home- patient denied difficulty affording or accessing medications prior to hospitalization. The CM discussed anticipated home health need- [place] of Choice discussed and agreeable for referral to be sent to [address] [place] [address], if agency cannot service patient, second choice would be [facility] Home [facility]. CM will continue to follow for transitions of care- family had questions about shower chair, PT/OT to evaluate post-op to assess for DME needs. MICHELLE Dumont Care Management Interventions PCP Verified by CM: Yes(Patient verified PCP as Dr. Mateo Das- will need to update in system ) Mode of Transport at Discharge: Other (see comment)(Family will transport home upon discharge) Transition of Care Consult (CM Consult): Discharge Planning MyChart Signup: No 
Discharge Durable Medical Equipment: No 
Health Maintenance Reviewed: Yes Physical Therapy Consult: No(PT evaluation post-op) Occupational Therapy Consult: No(OT eval post-op) Speech Therapy Consult: No 
Current Support Network: Lives with Spouse, Own Home Confirm Follow Up Transport: Family Plan discussed with Pt/Family/Caregiver: Yes [employer] Information Provided?: No 
Discharge Location Discharge Placement: Home with home health

## 2019-05-13 NOTE — PROGRESS NOTES
Name: Modesta Lan: Rocael Gomes 54  
: 1945 Admit Date: 2019 Phone: [phone]  Room: [facility] PCP: Tee Yee MD  MRN: [id number] Date: 2019  Code: Full Code HPI: 
 
 
+ cough and minimal congestion. 5/10 
5:53 PM    
 
History was obtained from patient. I was asked by Gregorio Clark MD to see Rufus De Santiago in consultation for a chief complaint of cough. History of Present Illness: 75year old male with past medical history as given below who presented to [facility] with complaints of cough. He was admitted to [facility] with chest pain and noted to have left main disease and is scheduled for CABG on 5/15/2019. Patient has been complaining of cough for the past 3 months. Has not tried any antibiotics before. Has cough productive of sputum mostly clear. No fever, chills, night sweats. Occasional wheezing. Some chest tightness. Does have complain of nasal stuffiness, post nasal drip. Has used omeprazole without much benefit but feels something is stuck in his throat when he eats. Records from out patient reviewed: 
-Positive skin allergy testing. Dust, willow, hackery, cottonwood, [employer], alternaria, elm, maple, ryegrass, walnut, sweetgum, wheat. 
-IgE 112 
-PFT: FEV1 1.49 L (56%) ==> post bd 1.74 L (65%) = 17% increase in FEV1. Air trapping. Normal dlco. 
 
Images: 
CT Chest reviewed - no endobronchial lesions. Some small nodules less than 4 mm. Past Medical History:  
Diagnosis Date  Arthritis  Asthma INHALER USED DAILY  Cancer ([address] SKIN  
 Other ill-defined conditions(799.89) HIGH CHOLESTEROL  
 Other ill-defined conditions(799.89) PVD  Other ill-defined conditions(799.89) OCCAS INSOMNIA Past Surgical History:  
Procedure Laterality Date  VASCULAR SURGERY PROCEDURE UNLIST    
 LEFT LEG, SFA ANGIOPLASTY No family history on file. Social History Tobacco Use  
  Smoking status: Current Every Day Smoker Packs/day: 0.75 Years: 50.00 Pack years: 37.50  Smokeless tobacco: Never Used Substance Use Topics  Alcohol use: Yes Alcohol/week: 2.5 oz Types: 5 drink(s) per week No Known Allergies Current Facility-Administered Medications Medication Dose Route Frequency  iohexol (OMNIPAQUE) 350 mg iodine/mL contrast injection 100 mL  100 mL Oral RAD ONCE  
 tamsulosin (FLOMAX) capsule 0.8 mg  0.8 mg Oral DAILY  finasteride (PROSCAR) tablet 5 mg  5 mg Oral DAILY  sodium chloride (NS) flush 5-40 mL  5-40 mL IntraVENous Q8H  
 sodium chloride (NS) flush 5-40 mL  5-40 mL IntraVENous PRN  
 simethicone (MYLICON) 88RN/7.0XT oral drops 80 mg  1.2 mL Oral Multiple  predniSONE (DELTASONE) tablet 40 mg  40 mg Oral DAILY WITH LUNCH  
 montelukast (SINGULAIR) tablet 10 mg  10 mg Oral QHS  sodium chloride (NS) flush 5-40 mL  5-40 mL IntraVENous Q8H  
 sodium chloride (NS) flush 5-40 mL  5-40 mL IntraVENous PRN  
 aspirin chewable tablet 81 mg  81 mg Oral DAILY  sodium phosphate (FLEET'S) enema 1 Enema  1 Enema Rectal PRN  chlorhexidine (PERIDEX) 0.12 % mouthwash 15 mL  15 mL Oral Q12H  
 atorvastatin (LIPITOR) tablet 40 mg  40 mg Oral DAILY  fluticasone propionate (FLONASE) 50 mcg/actuation nasal spray 2 Spray  2 Spray Both Nostrils DAILY  pantoprazole (PROTONIX) tablet 40 mg  40 mg Oral DAILY  arformoterol (BROVANA) neb solution 15 mcg  15 mcg Nebulization BID RT And  
 budesonide (PULMICORT) 500 mcg/2 ml nebulizer suspension  500 mcg Nebulization BID RT  
 albuterol-ipratropium (DUO-NEB) 2.5 MG-0.5 MG/3 ML  3 mL Nebulization Q6H PRN  
 cetirizine (ZYRTEC) tablet 10 mg  10 mg Oral DAILY  metoprolol tartrate (LOPRESSOR) tablet 12.5 mg  12.5 mg Oral Q12H  
 nicotine (NICODERM CQ) 21 mg/24 hr patch 1 Patch  1 Patch TransDERmal DAILY  ALPRAZolam (XANAX) tablet 0.5 mg  0.5 mg Oral TID PRN  
 
 
 
REVIEW OF SYSTEMS  
 Negative except as stated in the HPI. Physical Exam:  
Visit Vitals /53 (BP 1 Location: Left arm, BP Patient Position: At rest) Pulse (!) 58 Temp 97.9 °F (36.6 °C) Resp 16 Ht 5' 7\" (1.702 m) Wt 73 kg (160 lb 15 oz) SpO2 98% BMI 25.21 kg/m² General:  Alert, cooperative, no distress, appears stated age. Head:  Normocephalic, without obvious abnormality, atraumatic. Eyes:  Conjunctivae/corneas clear. PERRL, EOMs intact. Nose: Nares normal. Septum midline. Mucosa normal. No drainage or sinus tenderness. Throat: Lips, mucosa, and tongue normal. Teeth and gums normal.  
Neck: Supple, symmetrical, trachea midline, no adenopathy, thyroid: no enlargment/tenderness/nodules, no carotid bruit and no JVD. Back:   Symmetric, no curvature. ROM normal.  
Lungs:   Occasional rhonchi. Chest wall:  No tenderness or deformity. Heart:  Regular rate and rhythm, S1, S2 normal, no murmur, click, rub or gallop. Abdomen:   Soft, non-tender. Bowel sounds normal. No masses,  No organomegaly. Extremities: Extremities normal, atraumatic, no cyanosis or edema. Pulses: 2+ and symmetric all extremities. Skin: Skin color, texture, turgor normal. No rashes or lesions Lymph nodes: Cervical, supraclavicular, and axillary nodes normal.  
Neurologic: Grossly nonfocal  
 
 
Lab Results Component Value Date/Time Sodium 142 05/09/2019 02:33 PM  
 Potassium 4.0 05/09/2019 02:33 PM  
 Chloride 111 (H) 05/09/2019 02:33 PM  
 CO2 25 05/09/2019 02:33 PM  
 BUN 15 05/09/2019 02:33 PM  
 Creatinine 0.80 05/09/2019 02:33 PM  
 Glucose 98 05/09/2019 02:33 PM  
 Calcium 8.0 (L) 05/09/2019 02:33 PM  
 Magnesium 2.3 05/09/2019 02:33 PM  
 
 
Lab Results Component Value Date/Time WBC 12.1 (H) 05/09/2019 02:33 PM  
 HGB 13.6 05/09/2019 02:33 PM  
 PLATELET 201 24/18/9217 02:33 PM  
 MCV 92.6 05/09/2019 02:33 PM  
 
 
Lab Results Component Value Date/Time  INR 1.0 05/09/2019 02:33 PM  
 aPTT 29.6 05/09/2019 02:33 PM  
 AST (SGOT) 21 05/09/2019 02:33 PM  
 Alk. phosphatase 98 05/09/2019 02:33 PM  
 Protein, total 6.0 (L) 05/09/2019 02:33 PM  
 Albumin 2.8 (L) 05/09/2019 02:33 PM  
 Globulin 3.2 05/09/2019 02:33 PM  
 
 
No results found for: IRON, FE, TIBC, IBCT, PSAT, FERR Lab Results Component Value Date/Time TSH 0.72 05/09/2019 02:33 PM  
  
 
Lab Results Component Value Date/Time Color YELLOW/STRAW 05/09/2019 02:24 PM  
 Appearance CLEAR 05/09/2019 02:24 PM  
 Specific gravity 1.010 05/09/2019 02:24 PM  
 pH (UA) 5.0 05/09/2019 02:24 PM  
 Protein NEGATIVE  05/09/2019 02:24 PM  
 Glucose NEGATIVE  05/09/2019 02:24 PM  
 Ketone NEGATIVE  05/09/2019 02:24 PM  
 Bilirubin NEGATIVE  05/09/2019 02:24 PM  
 Blood TRACE (A) 05/09/2019 02:24 PM  
 Urobilinogen 0.2 05/09/2019 02:24 PM  
 Nitrites NEGATIVE  05/09/2019 02:24 PM  
 Leukocyte Esterase NEGATIVE  05/09/2019 02:24 PM  
 WBC 0-4 05/09/2019 02:24 PM  
 RBC 0-5 05/09/2019 02:24 PM  
 Bacteria NEGATIVE  05/09/2019 02:24 PM  
 
 
IMPRESSION: 
=========== 
-Cough - suspect cough related to allergies and post nasal drip. 
-Obstructive airway disease; asthma more likely than copd. -former smoker; recently stopped -GERD. -pre operative pulmonary clearance. PLAN: 
===== 
-on prednisone 40 mg 
-singulair, continue with Flonase and cetrizine. 
-Continue with pulmicort / Celine Pillow neb,. Albuterol prn. 
-outpatient evaluation for biologics. -Protonix. 
-Post surgery cough suppressants 
-preop evaluation as per Dr Melanie Fabry consult note WAGNER Jean

## 2019-05-13 NOTE — PROGRESS NOTES
[place] [address] 
[address] [place] [address], [address] GI PROGRESS NOTE Pao Gupta, [facility] office [phone] NP in-hospital cell phone M-F until 4:30 After 5pm or on weekends, please call  for physician on call NAME: Gasper James :  1945 MRN:  [id number] Subjective: He reports some improvement in cough on steroids. Objective: VITALS:  
Last 24hrs VS reviewed since prior progress note. Most recent are: 
Visit Vitals /53 (BP 1 Location: Left arm, BP Patient Position: At rest) Pulse (!) 58 Temp 97.9 °F (36.6 °C) Resp 16 Ht 5' 7\" (1.702 m) Wt 73 kg (160 lb 15 oz) SpO2 98% BMI 25.21 kg/m² PHYSICAL EXAM: 
General: Cooperative, no acute distress   
Neurologic:  Alert and oriented X 3. HEENT: EOMI, no scleral icterus Lungs:  CTA bilaterally Heart:  S1 S2, regular rhythm, no murmur Abdomen: Soft, non-distended, no tenderness. +Bowel sounds Extremities: No edema Psych:   Good insight. Not anxious or agitated. Lab Data Reviewed:  
 
No results found for this or any previous visit (from the past 24 hour(s)). A discrete pancreatic mass is not identified. No acute abnormality is noted. Tiny bilateral renal cysts are evident. Assessment:  
· Weight loss: decreased appetite due to dysgeusia, ?dysphagia · ? Reflux: cough in a smoker with COPD · Diarrhea: self-limited 10 days, resovled · Hypoattenuation in the distal pancreatic body, tail, and uncinate process with scattered enlarged mesenteric lymph nodes · CAD: plan for CABG 5/15 · COPD/asthma: pulmonary following Patient Active Problem List  
Diagnosis Code  Carotid stenosis I65.29  
 CAD (coronary artery disease) I25.10 Plan: · PPI 
· UGI series · MRI final read pending · Discussed with cardiac surgery - given high risk for EGD will defer to elective/outpatient evaluation Signed By: Manpreet Keen NP   
 2019  10:28 AM 
 This patient was seen and examined by me in a face-to-face visit today. I reviewed the medical record including lab work, imaging and other provider notes. I confirmed the interval history as described above. I spoke to the patient, discussing our findings and plans. I discussed this case in detail with Mita Fraire. I formulated an updated  assessment of this patient and guided our treatment plan. I agree with the above progress note. I agree with the history, exam and assessment and plan as outlined in the note. I would like to add the following: UGI study today since high risk for EGD Discussed it with him and wife at bedside Will follow

## 2019-05-13 NOTE — PROGRESS NOTES
Cardiac Surgery Care Coordinator-  Met with Romana Cress, Introduced role of the Cardiac Surgery Care Coordinator. Reviewed plan of care and began pre-op education. Mr Nisha Mayes verbalized concerns regarding GI work-up. Offered emotional support. Nicky Alexander, TRUDYWill

## 2019-05-14 ENCOUNTER — ANESTHESIA (OUTPATIENT)
Dept: CARDIOTHORACIC SURGERY | Age: 74
DRG: 234 | End: 2019-05-14
Payer: MEDICARE

## 2019-05-14 ENCOUNTER — APPOINTMENT (OUTPATIENT)
Dept: GENERAL RADIOLOGY | Age: 74
DRG: 234 | End: 2019-05-14
Attending: PHYSICIAN ASSISTANT
Payer: MEDICARE

## 2019-05-14 ENCOUNTER — APPOINTMENT (OUTPATIENT)
Dept: NON INVASIVE DIAGNOSTICS | Age: 74
DRG: 234 | End: 2019-05-14
Attending: THORACIC SURGERY (CARDIOTHORACIC VASCULAR SURGERY)
Payer: MEDICARE

## 2019-05-14 PROBLEM — Z95.1 S/P CABG X 3: Status: ACTIVE | Noted: 2019-05-14

## 2019-05-14 LAB
ADMINISTERED INITIALS, ADMINIT: NORMAL
ALBUMIN SERPL-MCNC: 2.7 G/DL (ref 3.5–5)
ALBUMIN SERPL-MCNC: 3 G/DL (ref 3.5–5)
ALBUMIN SERPL-MCNC: 3.2 G/DL (ref 3.5–5)
ALBUMIN/GLOB SERPL: 0.8 {RATIO} (ref 1.1–2.2)
ALBUMIN/GLOB SERPL: 1 {RATIO} (ref 1.1–2.2)
ALBUMIN/GLOB SERPL: 1.3 {RATIO} (ref 1.1–2.2)
ALP SERPL-CCNC: 102 U/L (ref 45–117)
ALP SERPL-CCNC: 71 U/L (ref 45–117)
ALP SERPL-CCNC: 72 U/L (ref 45–117)
ALT SERPL-CCNC: 25 U/L (ref 12–78)
ALT SERPL-CCNC: 26 U/L (ref 12–78)
ALT SERPL-CCNC: 36 U/L (ref 12–78)
ANION GAP SERPL CALC-SCNC: 5 MMOL/L (ref 5–15)
ANION GAP SERPL CALC-SCNC: 6 MMOL/L (ref 5–15)
ANION GAP SERPL CALC-SCNC: 6 MMOL/L (ref 5–15)
APTT PPP: 25.8 SEC (ref 22.1–32)
ARTERIAL PATENCY WRIST A: ABNORMAL
AST SERPL-CCNC: 21 U/L (ref 15–37)
AST SERPL-CCNC: 23 U/L (ref 15–37)
AST SERPL-CCNC: 26 U/L (ref 15–37)
BASE DEFICIT BLD-SCNC: 1 MMOL/L
BASE DEFICIT BLD-SCNC: 3 MMOL/L
BASE DEFICIT BLDV-SCNC: 5 MMOL/L
BASOPHILS # BLD: 0 K/UL (ref 0–0.1)
BASOPHILS NFR BLD: 0 % (ref 0–1)
BDY SITE: ABNORMAL
BILIRUB SERPL-MCNC: 0.2 MG/DL (ref 0.2–1)
BILIRUB SERPL-MCNC: 0.2 MG/DL (ref 0.2–1)
BILIRUB SERPL-MCNC: 0.3 MG/DL (ref 0.2–1)
BUN SERPL-MCNC: 21 MG/DL (ref 6–20)
BUN SERPL-MCNC: 21 MG/DL (ref 6–20)
BUN SERPL-MCNC: 22 MG/DL (ref 6–20)
BUN/CREAT SERPL: 21 (ref 12–20)
BUN/CREAT SERPL: 22 (ref 12–20)
BUN/CREAT SERPL: 22 (ref 12–20)
CALCIUM SERPL-MCNC: 7.8 MG/DL (ref 8.5–10.1)
CALCIUM SERPL-MCNC: 8.3 MG/DL (ref 8.5–10.1)
CALCIUM SERPL-MCNC: 8.6 MG/DL (ref 8.5–10.1)
CHLORIDE SERPL-SCNC: 113 MMOL/L (ref 97–108)
CHLORIDE SERPL-SCNC: 113 MMOL/L (ref 97–108)
CHLORIDE SERPL-SCNC: 115 MMOL/L (ref 97–108)
CO2 SERPL-SCNC: 24 MMOL/L (ref 21–32)
CO2 SERPL-SCNC: 24 MMOL/L (ref 21–32)
CO2 SERPL-SCNC: 25 MMOL/L (ref 21–32)
CREAT SERPL-MCNC: 0.94 MG/DL (ref 0.7–1.3)
CREAT SERPL-MCNC: 0.98 MG/DL (ref 0.7–1.3)
CREAT SERPL-MCNC: 1.01 MG/DL (ref 0.7–1.3)
D50 ADMINISTERED, D50ADM: 0 ML
D50 ADMINISTERED, D50ADM: 11 ML
D50 ORDER, D50ORD: 0 ML
D50 ORDER, D50ORD: 11 ML
DIFFERENTIAL METHOD BLD: ABNORMAL
EOSINOPHIL # BLD: 0 K/UL (ref 0–0.4)
EOSINOPHIL NFR BLD: 0 % (ref 0–7)
ERYTHROCYTE [DISTWIDTH] IN BLOOD BY AUTOMATED COUNT: 12.5 % (ref 11.5–14.5)
ERYTHROCYTE [DISTWIDTH] IN BLOOD BY AUTOMATED COUNT: 12.5 % (ref 11.5–14.5)
GAS FLOW.O2 O2 DELIVERY SYS: ABNORMAL L/MIN
GAS FLOW.O2 SETTING OXYMISER: 12 BPM
GAS FLOW.O2 SETTING OXYMISER: 12 BPM
GLOBULIN SER CALC-MCNC: 2.5 G/DL (ref 2–4)
GLOBULIN SER CALC-MCNC: 2.7 G/DL (ref 2–4)
GLOBULIN SER CALC-MCNC: 3.7 G/DL (ref 2–4)
GLSCOM COMMENTS: NORMAL
GLUCOSE BLD STRIP.AUTO-MCNC: 104 MG/DL (ref 65–100)
GLUCOSE BLD STRIP.AUTO-MCNC: 109 MG/DL (ref 65–100)
GLUCOSE BLD STRIP.AUTO-MCNC: 110 MG/DL (ref 65–100)
GLUCOSE BLD STRIP.AUTO-MCNC: 130 MG/DL (ref 65–100)
GLUCOSE BLD STRIP.AUTO-MCNC: 73 MG/DL (ref 65–100)
GLUCOSE BLD STRIP.AUTO-MCNC: 81 MG/DL (ref 65–100)
GLUCOSE BLD STRIP.AUTO-MCNC: 87 MG/DL (ref 65–100)
GLUCOSE SERPL-MCNC: 141 MG/DL (ref 65–100)
GLUCOSE SERPL-MCNC: 145 MG/DL (ref 65–100)
GLUCOSE SERPL-MCNC: 72 MG/DL (ref 65–100)
GLUCOSE, GLC: 104 MG/DL
GLUCOSE, GLC: 109 MG/DL
GLUCOSE, GLC: 110 MG/DL
GLUCOSE, GLC: 130 MG/DL
GLUCOSE, GLC: 132 MG/DL
GLUCOSE, GLC: 151 MG/DL
GLUCOSE, GLC: 156 MG/DL
GLUCOSE, GLC: 159 MG/DL
GLUCOSE, GLC: 73 MG/DL
GLUCOSE, GLC: 81 MG/DL
GLUCOSE, GLC: 87 MG/DL
HCO3 BLD-SCNC: 23.7 MMOL/L (ref 22–26)
HCO3 BLD-SCNC: 25 MMOL/L (ref 22–26)
HCO3 BLDV-SCNC: 22.8 MMOL/L (ref 23–28)
HCT VFR BLD AUTO: 39.1 % (ref 36.6–50.3)
HCT VFR BLD AUTO: 39.4 % (ref 36.6–50.3)
HCT VFR BLD AUTO: 41.8 % (ref 36.6–50.3)
HGB BLD-MCNC: 13 G/DL (ref 12.1–17)
HGB BLD-MCNC: 13 G/DL (ref 12.1–17)
HGB BLD-MCNC: 14.2 G/DL (ref 12.1–17)
HIGH TARGET, HITG: 130 MG/DL
HIGH TARGET, HITG: 140 MG/DL
IMM GRANULOCYTES # BLD AUTO: 0.3 K/UL (ref 0–0.04)
IMM GRANULOCYTES NFR BLD AUTO: 1 % (ref 0–0.5)
INR PPP: 1.1 (ref 0.9–1.1)
INSULIN ADMINSTERED, INSADM: 0 UNITS/HOUR
INSULIN ADMINSTERED, INSADM: 0.8 UNITS/HOUR
INSULIN ADMINSTERED, INSADM: 0.8 UNITS/HOUR
INSULIN ADMINSTERED, INSADM: 2.1 UNITS/HOUR
INSULIN ADMINSTERED, INSADM: 2.2 UNITS/HOUR
INSULIN ADMINSTERED, INSADM: 2.9 UNITS/HOUR
INSULIN ADMINSTERED, INSADM: 3 UNITS/HOUR
INSULIN ADMINSTERED, INSADM: 4 UNITS/HOUR
INSULIN ADMINSTERED, INSADM: 4.2 UNITS/HOUR
INSULIN ADMINSTERED, INSADM: 4.8 UNITS/HOUR
INSULIN ADMINSTERED, INSADM: 5.5 UNITS/HOUR
INSULIN ORDER, INSORD: 0 UNITS/HOUR
INSULIN ORDER, INSORD: 0.8 UNITS/HOUR
INSULIN ORDER, INSORD: 0.8 UNITS/HOUR
INSULIN ORDER, INSORD: 2.1 UNITS/HOUR
INSULIN ORDER, INSORD: 2.2 UNITS/HOUR
INSULIN ORDER, INSORD: 2.9 UNITS/HOUR
INSULIN ORDER, INSORD: 3 UNITS/HOUR
INSULIN ORDER, INSORD: 4 UNITS/HOUR
INSULIN ORDER, INSORD: 4.2 UNITS/HOUR
INSULIN ORDER, INSORD: 4.8 UNITS/HOUR
INSULIN ORDER, INSORD: 5.5 UNITS/HOUR
LOW TARGET, LOT: 100 MG/DL
LOW TARGET, LOT: 95 MG/DL
LYMPHOCYTES # BLD: 1.2 K/UL (ref 0.8–3.5)
LYMPHOCYTES NFR BLD: 4 % (ref 12–49)
MAGNESIUM SERPL-MCNC: 2.3 MG/DL (ref 1.6–2.4)
MAGNESIUM SERPL-MCNC: 2.3 MG/DL (ref 1.6–2.4)
MAGNESIUM SERPL-MCNC: 2.4 MG/DL (ref 1.6–2.4)
MCH RBC QN AUTO: 30.8 PG (ref 26–34)
MCH RBC QN AUTO: 31.7 PG (ref 26–34)
MCHC RBC AUTO-ENTMCNC: 33 G/DL (ref 30–36.5)
MCHC RBC AUTO-ENTMCNC: 34 G/DL (ref 30–36.5)
MCV RBC AUTO: 93.3 FL (ref 80–99)
MCV RBC AUTO: 93.4 FL (ref 80–99)
MINUTES UNTIL NEXT BG, NBG: 15 MIN
MINUTES UNTIL NEXT BG, NBG: 60 MIN
MONOCYTES # BLD: 2.1 K/UL (ref 0–1)
MONOCYTES NFR BLD: 7 % (ref 5–13)
MULTIPLIER, MUL: 0.03
MULTIPLIER, MUL: 0.03
MULTIPLIER, MUL: 0.04
MULTIPLIER, MUL: 0.04
MULTIPLIER, MUL: 0.05
MULTIPLIER, MUL: 0.06
NEUTS SEG # BLD: 26.1 K/UL (ref 1.8–8)
NEUTS SEG NFR BLD: 88 % (ref 32–75)
NRBC # BLD: 0 K/UL (ref 0–0.01)
NRBC # BLD: 0 K/UL (ref 0–0.01)
NRBC BLD-RTO: 0 PER 100 WBC
NRBC BLD-RTO: 0 PER 100 WBC
O2/TOTAL GAS SETTING VFR VENT: 0.8 %
O2/TOTAL GAS SETTING VFR VENT: 0.8 %
O2/TOTAL GAS SETTING VFR VENT: 50 %
ORDER INITIALS, ORDINIT: NORMAL
PCO2 BLD: 45.8 MMHG (ref 35–45)
PCO2 BLD: 52.6 MMHG (ref 35–45)
PCO2 BLDV: 53 MMHG (ref 41–51)
PEEP RESPIRATORY: 5 CMH2O
PH BLD: 7.26 [PH] (ref 7.35–7.45)
PH BLD: 7.35 [PH] (ref 7.35–7.45)
PH BLDV: 7.24 [PH] (ref 7.32–7.42)
PLATELET # BLD AUTO: 257 K/UL (ref 150–400)
PLATELET # BLD AUTO: 341 K/UL (ref 150–400)
PMV BLD AUTO: 10 FL (ref 8.9–12.9)
PMV BLD AUTO: 10.1 FL (ref 8.9–12.9)
PO2 BLD: 176 MMHG (ref 80–100)
PO2 BLD: 271 MMHG (ref 80–100)
PO2 BLDV: 48 MMHG (ref 25–40)
POTASSIUM SERPL-SCNC: 3.8 MMOL/L (ref 3.5–5.1)
POTASSIUM SERPL-SCNC: 4.1 MMOL/L (ref 3.5–5.1)
POTASSIUM SERPL-SCNC: 4.5 MMOL/L (ref 3.5–5.1)
PRESSURE SUPPORT SETTING VENT: 5 CMH2O
PROT SERPL-MCNC: 5.4 G/DL (ref 6.4–8.2)
PROT SERPL-MCNC: 5.7 G/DL (ref 6.4–8.2)
PROT SERPL-MCNC: 6.7 G/DL (ref 6.4–8.2)
PROTHROMBIN TIME: 11.5 SEC (ref 9–11.1)
RBC # BLD AUTO: 4.22 M/UL (ref 4.1–5.7)
RBC # BLD AUTO: 4.48 M/UL (ref 4.1–5.7)
RBC MORPH BLD: ABNORMAL
SAO2 % BLD: 100 % (ref 92–97)
SAO2 % BLD: 99 % (ref 92–97)
SAO2 % BLDV: 75 % (ref 65–88)
SERVICE CMNT-IMP: ABNORMAL
SERVICE CMNT-IMP: NORMAL
SODIUM SERPL-SCNC: 143 MMOL/L (ref 136–145)
SODIUM SERPL-SCNC: 143 MMOL/L (ref 136–145)
SODIUM SERPL-SCNC: 145 MMOL/L (ref 136–145)
SPECIMEN TYPE: ABNORMAL
THERAPEUTIC RANGE,PTTT: NORMAL SECS (ref 58–77)
TOTAL RESP. RATE, ITRR: 12
TOTAL RESP. RATE, ITRR: 12
VENTILATION MODE VENT: ABNORMAL
VT SETTING VENT: 492 ML
VT SETTING VENT: 500 ML
WBC # BLD AUTO: 11 K/UL (ref 4.1–11.1)
WBC # BLD AUTO: 29.7 K/UL (ref 4.1–11.1)

## 2019-05-14 PROCEDURE — 77030020061 HC IV BLD WRMR ADMIN SET 3M -B: Performed by: ANESTHESIOLOGY

## 2019-05-14 PROCEDURE — 80053 COMPREHEN METABOLIC PANEL: CPT

## 2019-05-14 PROCEDURE — 74011250636 HC RX REV CODE- 250/636

## 2019-05-14 PROCEDURE — 83735 ASSAY OF MAGNESIUM: CPT

## 2019-05-14 PROCEDURE — 74011000250 HC RX REV CODE- 250

## 2019-05-14 PROCEDURE — 74011250636 HC RX REV CODE- 250/636: Performed by: PHYSICIAN ASSISTANT

## 2019-05-14 PROCEDURE — 94640 AIRWAY INHALATION TREATMENT: CPT

## 2019-05-14 PROCEDURE — 76010000111 HC CV SURG 3.5 TO 4 HR: Performed by: THORACIC SURGERY (CARDIOTHORACIC VASCULAR SURGERY)

## 2019-05-14 PROCEDURE — 77030006247 HC LD PCMKR MYOCRD BPLR TEMP MEDT -B: Performed by: THORACIC SURGERY (CARDIOTHORACIC VASCULAR SURGERY)

## 2019-05-14 PROCEDURE — 82962 GLUCOSE BLOOD TEST: CPT

## 2019-05-14 PROCEDURE — 77030018729 HC ELECTRD DEFIB PAD CARD -B

## 2019-05-14 PROCEDURE — 77030018846 HC SOL IRR STRL H20 ICUM -A: Performed by: THORACIC SURGERY (CARDIOTHORACIC VASCULAR SURGERY)

## 2019-05-14 PROCEDURE — C1751 CATH, INF, PER/CENT/MIDLINE: HCPCS

## 2019-05-14 PROCEDURE — 77030005402 HC CATH RAD ART LN KT TELE -B

## 2019-05-14 PROCEDURE — 85610 PROTHROMBIN TIME: CPT

## 2019-05-14 PROCEDURE — 77030039266 HC ADH SKN EXOFIN S2SG -A: Performed by: THORACIC SURGERY (CARDIOTHORACIC VASCULAR SURGERY)

## 2019-05-14 PROCEDURE — P9047 ALBUMIN (HUMAN), 25%, 50ML: HCPCS

## 2019-05-14 PROCEDURE — 85025 COMPLETE CBC W/AUTO DIFF WBC: CPT

## 2019-05-14 PROCEDURE — 77030010938 HC CLP LIG TELE -A: Performed by: THORACIC SURGERY (CARDIOTHORACIC VASCULAR SURGERY)

## 2019-05-14 PROCEDURE — P9045 ALBUMIN (HUMAN), 5%, 250 ML: HCPCS | Performed by: PHYSICIAN ASSISTANT

## 2019-05-14 PROCEDURE — 77030002973 HC SUT PLEDG CV SFT OVL TELE -B: Performed by: THORACIC SURGERY (CARDIOTHORACIC VASCULAR SURGERY)

## 2019-05-14 PROCEDURE — 94002 VENT MGMT INPAT INIT DAY: CPT

## 2019-05-14 PROCEDURE — 77030003010 HC SUT SURG STL J&J -B: Performed by: THORACIC SURGERY (CARDIOTHORACIC VASCULAR SURGERY)

## 2019-05-14 PROCEDURE — C1713 ANCHOR/SCREW BN/BN,TIS/BN: HCPCS | Performed by: THORACIC SURGERY (CARDIOTHORACIC VASCULAR SURGERY)

## 2019-05-14 PROCEDURE — 77030034848: Performed by: THORACIC SURGERY (CARDIOTHORACIC VASCULAR SURGERY)

## 2019-05-14 PROCEDURE — 77030008684 HC TU ET CUF COVD -B: Performed by: ANESTHESIOLOGY

## 2019-05-14 PROCEDURE — 77030022199 HC SYS HARV VESL GTNG -G1: Performed by: THORACIC SURGERY (CARDIOTHORACIC VASCULAR SURGERY)

## 2019-05-14 PROCEDURE — 77030006920 HC BLD STRNL SAW STRY -B: Performed by: THORACIC SURGERY (CARDIOTHORACIC VASCULAR SURGERY)

## 2019-05-14 PROCEDURE — 74011000258 HC RX REV CODE- 258

## 2019-05-14 PROCEDURE — 77030013861 HC PNCH AORT CLNCUT QUES -B: Performed by: THORACIC SURGERY (CARDIOTHORACIC VASCULAR SURGERY)

## 2019-05-14 PROCEDURE — 77030002978 HC SUT POLY TELE -A: Performed by: THORACIC SURGERY (CARDIOTHORACIC VASCULAR SURGERY)

## 2019-05-14 PROCEDURE — 77030012390 HC DRN CHST BTL GTNG -B: Performed by: THORACIC SURGERY (CARDIOTHORACIC VASCULAR SURGERY)

## 2019-05-14 PROCEDURE — 06BP4ZZ EXCISION OF RIGHT SAPHENOUS VEIN, PERCUTANEOUS ENDOSCOPIC APPROACH: ICD-10-PCS | Performed by: THORACIC SURGERY (CARDIOTHORACIC VASCULAR SURGERY)

## 2019-05-14 PROCEDURE — 77030019579 HC CBL PACE DISP REMG -B: Performed by: THORACIC SURGERY (CARDIOTHORACIC VASCULAR SURGERY)

## 2019-05-14 PROCEDURE — 77030018836 HC SOL IRR NACL ICUM -A: Performed by: THORACIC SURGERY (CARDIOTHORACIC VASCULAR SURGERY)

## 2019-05-14 PROCEDURE — 77030011256 HC DRSG MEPILEX <16IN NO BORD MOLN -A: Performed by: THORACIC SURGERY (CARDIOTHORACIC VASCULAR SURGERY)

## 2019-05-14 PROCEDURE — 74011250637 HC RX REV CODE- 250/637: Performed by: PHYSICIAN ASSISTANT

## 2019-05-14 PROCEDURE — 65610000003 HC RM ICU SURGICAL

## 2019-05-14 PROCEDURE — 77030011640 HC PAD GRND REM COVD -A: Performed by: THORACIC SURGERY (CARDIOTHORACIC VASCULAR SURGERY)

## 2019-05-14 PROCEDURE — 77030002986 HC SUT PROL J&J -A: Performed by: THORACIC SURGERY (CARDIOTHORACIC VASCULAR SURGERY)

## 2019-05-14 PROCEDURE — 77030018835 HC SOL IRR LR ICUM -A: Performed by: THORACIC SURGERY (CARDIOTHORACIC VASCULAR SURGERY)

## 2019-05-14 PROCEDURE — 85018 HEMOGLOBIN: CPT

## 2019-05-14 PROCEDURE — 77030037877 HC DRSG MEPILEX >48IN BORD MOLN -A

## 2019-05-14 PROCEDURE — 02100Z9 BYPASS CORONARY ARTERY, ONE ARTERY FROM LEFT INTERNAL MAMMARY, OPEN APPROACH: ICD-10-PCS | Performed by: THORACIC SURGERY (CARDIOTHORACIC VASCULAR SURGERY)

## 2019-05-14 PROCEDURE — 77030014491 HC PLEDG PTFE BARD -A: Performed by: THORACIC SURGERY (CARDIOTHORACIC VASCULAR SURGERY)

## 2019-05-14 PROCEDURE — 76060000038 HC ANESTHESIA 3.5 TO 4 HR: Performed by: THORACIC SURGERY (CARDIOTHORACIC VASCULAR SURGERY)

## 2019-05-14 PROCEDURE — 74011636637 HC RX REV CODE- 636/637

## 2019-05-14 PROCEDURE — 93355 ECHO TRANSESOPHAGEAL (TEE): CPT | Performed by: THORACIC SURGERY (CARDIOTHORACIC VASCULAR SURGERY)

## 2019-05-14 PROCEDURE — 74011250637 HC RX REV CODE- 250/637: Performed by: INTERNAL MEDICINE

## 2019-05-14 PROCEDURE — 74011000258 HC RX REV CODE- 258: Performed by: PHYSICIAN ASSISTANT

## 2019-05-14 PROCEDURE — 74011250636 HC RX REV CODE- 250/636: Performed by: ANESTHESIOLOGY

## 2019-05-14 PROCEDURE — 77030010389 HC WRE ATR PACE AEMC -B: Performed by: THORACIC SURGERY (CARDIOTHORACIC VASCULAR SURGERY)

## 2019-05-14 PROCEDURE — 77030007667 HC INSRT SUT HLD MEDT -B: Performed by: THORACIC SURGERY (CARDIOTHORACIC VASCULAR SURGERY)

## 2019-05-14 PROCEDURE — 03HY32Z INSERTION OF MONITORING DEVICE INTO UPPER ARTERY, PERCUTANEOUS APPROACH: ICD-10-PCS | Performed by: ANESTHESIOLOGY

## 2019-05-14 PROCEDURE — 77030002933 HC SUT MCRYL J&J -A: Performed by: THORACIC SURGERY (CARDIOTHORACIC VASCULAR SURGERY)

## 2019-05-14 PROCEDURE — 77030019908 HC STETH ESOPH SIMS -A: Performed by: ANESTHESIOLOGY

## 2019-05-14 PROCEDURE — 74011000250 HC RX REV CODE- 250: Performed by: THORACIC SURGERY (CARDIOTHORACIC VASCULAR SURGERY)

## 2019-05-14 PROCEDURE — 82803 BLOOD GASES ANY COMBINATION: CPT

## 2019-05-14 PROCEDURE — 74011000250 HC RX REV CODE- 250: Performed by: NURSE PRACTITIONER

## 2019-05-14 PROCEDURE — 77030002987 HC SUT PROL J&J -B: Performed by: THORACIC SURGERY (CARDIOTHORACIC VASCULAR SURGERY)

## 2019-05-14 PROCEDURE — 85730 THROMBOPLASTIN TIME PARTIAL: CPT

## 2019-05-14 PROCEDURE — 77030020256 HC SOL INJ NACL 0.9%  500ML: Performed by: THORACIC SURGERY (CARDIOTHORACIC VASCULAR SURGERY)

## 2019-05-14 PROCEDURE — 5A1221Z PERFORMANCE OF CARDIAC OUTPUT, CONTINUOUS: ICD-10-PCS | Performed by: THORACIC SURGERY (CARDIOTHORACIC VASCULAR SURGERY)

## 2019-05-14 PROCEDURE — 77030020747 HC TU INSUF ENDOSC TELE -A: Performed by: THORACIC SURGERY (CARDIOTHORACIC VASCULAR SURGERY)

## 2019-05-14 PROCEDURE — 36415 COLL VENOUS BLD VENIPUNCTURE: CPT

## 2019-05-14 PROCEDURE — 77030020263 HC SOL INJ SOD CL0.9% LFCR 1000ML: Performed by: THORACIC SURGERY (CARDIOTHORACIC VASCULAR SURGERY)

## 2019-05-14 PROCEDURE — 74011250636 HC RX REV CODE- 250/636: Performed by: THORACIC SURGERY (CARDIOTHORACIC VASCULAR SURGERY)

## 2019-05-14 PROCEDURE — 71045 X-RAY EXAM CHEST 1 VIEW: CPT

## 2019-05-14 PROCEDURE — 77030011255 HC DSG AQUACEL AG BMS -A: Performed by: THORACIC SURGERY (CARDIOTHORACIC VASCULAR SURGERY)

## 2019-05-14 PROCEDURE — 77030012407 HC DRN WND BARD -B: Performed by: THORACIC SURGERY (CARDIOTHORACIC VASCULAR SURGERY)

## 2019-05-14 PROCEDURE — 74011000272 HC RX REV CODE- 272

## 2019-05-14 PROCEDURE — 77030040361 HC SLV COMPR DVT MDII -B

## 2019-05-14 PROCEDURE — B24BZZ4 ULTRASONOGRAPHY OF HEART WITH AORTA, TRANSESOPHAGEAL: ICD-10-PCS | Performed by: ANESTHESIOLOGY

## 2019-05-14 PROCEDURE — 021109W BYPASS CORONARY ARTERY, TWO ARTERIES FROM AORTA WITH AUTOLOGOUS VENOUS TISSUE, OPEN APPROACH: ICD-10-PCS | Performed by: THORACIC SURGERY (CARDIOTHORACIC VASCULAR SURGERY)

## 2019-05-14 PROCEDURE — 05HM33Z INSERTION OF INFUSION DEVICE INTO RIGHT INTERNAL JUGULAR VEIN, PERCUTANEOUS APPROACH: ICD-10-PCS | Performed by: ANESTHESIOLOGY

## 2019-05-14 PROCEDURE — 74011250637 HC RX REV CODE- 250/637: Performed by: NURSE PRACTITIONER

## 2019-05-14 PROCEDURE — 77030013798 HC KT TRNSDUC PRSSR EDWD -B: Performed by: THORACIC SURGERY (CARDIOTHORACIC VASCULAR SURGERY)

## 2019-05-14 PROCEDURE — 77030033138 HC SUT PGA STRATFX J&J -B: Performed by: THORACIC SURGERY (CARDIOTHORACIC VASCULAR SURGERY)

## 2019-05-14 PROCEDURE — 85027 COMPLETE CBC AUTOMATED: CPT

## 2019-05-14 PROCEDURE — 77030010605 HC BLWR MR MAL MEDT -B: Performed by: THORACIC SURGERY (CARDIOTHORACIC VASCULAR SURGERY)

## 2019-05-14 PROCEDURE — 77030006689 HC BLD OPHTH BVR BD -A: Performed by: THORACIC SURGERY (CARDIOTHORACIC VASCULAR SURGERY)

## 2019-05-14 PROCEDURE — 74011000250 HC RX REV CODE- 250: Performed by: PHYSICIAN ASSISTANT

## 2019-05-14 RX ORDER — ALBUTEROL SULFATE 0.83 MG/ML
2.5 SOLUTION RESPIRATORY (INHALATION)
Status: DISCONTINUED | OUTPATIENT
Start: 2019-05-14 | End: 2019-05-20 | Stop reason: HOSPADM

## 2019-05-14 RX ORDER — FACIAL-BODY WIPES
10 EACH TOPICAL DAILY PRN
Status: DISCONTINUED | OUTPATIENT
Start: 2019-05-14 | End: 2019-05-20 | Stop reason: HOSPADM

## 2019-05-14 RX ORDER — SUFENTANIL CITRATE 50 UG/ML
INJECTION EPIDURAL; INTRAVENOUS
Status: DISCONTINUED | OUTPATIENT
Start: 2019-05-14 | End: 2019-05-14 | Stop reason: HOSPADM

## 2019-05-14 RX ORDER — MORPHINE SULFATE 2 MG/ML
2 INJECTION, SOLUTION INTRAMUSCULAR; INTRAVENOUS
Status: DISCONTINUED | OUTPATIENT
Start: 2019-05-14 | End: 2019-05-17

## 2019-05-14 RX ORDER — SODIUM CHLORIDE 0.9 % (FLUSH) 0.9 %
5-40 SYRINGE (ML) INJECTION EVERY 8 HOURS
Status: DISCONTINUED | OUTPATIENT
Start: 2019-05-14 | End: 2019-05-16

## 2019-05-14 RX ORDER — HEPARIN SODIUM 1000 [USP'U]/ML
INJECTION, SOLUTION INTRAVENOUS; SUBCUTANEOUS AS NEEDED
Status: DISCONTINUED | OUTPATIENT
Start: 2019-05-14 | End: 2019-05-14 | Stop reason: HOSPADM

## 2019-05-14 RX ORDER — SUFENTANIL CITRATE 50 UG/ML
INJECTION EPIDURAL; INTRAVENOUS AS NEEDED
Status: DISCONTINUED | OUTPATIENT
Start: 2019-05-14 | End: 2019-05-14 | Stop reason: HOSPADM

## 2019-05-14 RX ORDER — SODIUM CHLORIDE 9 MG/ML
25 INJECTION, SOLUTION INTRAVENOUS CONTINUOUS
Status: DISCONTINUED | OUTPATIENT
Start: 2019-05-14 | End: 2019-05-14

## 2019-05-14 RX ORDER — POTASSIUM CHLORIDE 29.8 MG/ML
20 INJECTION INTRAVENOUS
Status: DISPENSED | OUTPATIENT
Start: 2019-05-14 | End: 2019-05-15

## 2019-05-14 RX ORDER — DIPHENHYDRAMINE HCL 25 MG
25 CAPSULE ORAL
Status: DISCONTINUED | OUTPATIENT
Start: 2019-05-14 | End: 2019-05-20 | Stop reason: HOSPADM

## 2019-05-14 RX ORDER — OXYCODONE AND ACETAMINOPHEN 5; 325 MG/1; MG/1
1 TABLET ORAL
Status: DISCONTINUED | OUTPATIENT
Start: 2019-05-14 | End: 2019-05-20 | Stop reason: HOSPADM

## 2019-05-14 RX ORDER — SODIUM CHLORIDE, SODIUM LACTATE, POTASSIUM CHLORIDE, CALCIUM CHLORIDE 600; 310; 30; 20 MG/100ML; MG/100ML; MG/100ML; MG/100ML
25 INJECTION, SOLUTION INTRAVENOUS CONTINUOUS
Status: DISCONTINUED | OUTPATIENT
Start: 2019-05-14 | End: 2019-05-14

## 2019-05-14 RX ORDER — SUCCINYLCHOLINE CHLORIDE 20 MG/ML
INJECTION INTRAMUSCULAR; INTRAVENOUS AS NEEDED
Status: DISCONTINUED | OUTPATIENT
Start: 2019-05-14 | End: 2019-05-14 | Stop reason: HOSPADM

## 2019-05-14 RX ORDER — INSULIN LISPRO 100 [IU]/ML
INJECTION, SOLUTION INTRAVENOUS; SUBCUTANEOUS
Status: DISCONTINUED | OUTPATIENT
Start: 2019-05-14 | End: 2019-05-16

## 2019-05-14 RX ORDER — OXYCODONE AND ACETAMINOPHEN 5; 325 MG/1; MG/1
2 TABLET ORAL
Status: DISCONTINUED | OUTPATIENT
Start: 2019-05-14 | End: 2019-05-20 | Stop reason: HOSPADM

## 2019-05-14 RX ORDER — ONDANSETRON 2 MG/ML
INJECTION INTRAMUSCULAR; INTRAVENOUS AS NEEDED
Status: DISCONTINUED | OUTPATIENT
Start: 2019-05-14 | End: 2019-05-14 | Stop reason: HOSPADM

## 2019-05-14 RX ORDER — AMIODARONE HYDROCHLORIDE 200 MG/1
400 TABLET ORAL EVERY 12 HOURS
Status: DISCONTINUED | OUTPATIENT
Start: 2019-05-15 | End: 2019-05-20 | Stop reason: HOSPADM

## 2019-05-14 RX ORDER — SODIUM CHLORIDE 0.9 % (FLUSH) 0.9 %
5-40 SYRINGE (ML) INJECTION EVERY 8 HOURS
Status: DISCONTINUED | OUTPATIENT
Start: 2019-05-14 | End: 2019-05-14

## 2019-05-14 RX ORDER — CHLORHEXIDINE GLUCONATE 1.2 MG/ML
10 RINSE ORAL 2 TIMES DAILY
Status: DISCONTINUED | OUTPATIENT
Start: 2019-05-14 | End: 2019-05-20 | Stop reason: HOSPADM

## 2019-05-14 RX ORDER — INSULIN LISPRO 100 [IU]/ML
INJECTION, SOLUTION INTRAVENOUS; SUBCUTANEOUS
Status: DISCONTINUED | OUTPATIENT
Start: 2019-05-14 | End: 2019-05-17

## 2019-05-14 RX ORDER — SODIUM CHLORIDE 0.9 % (FLUSH) 0.9 %
5-40 SYRINGE (ML) INJECTION AS NEEDED
Status: DISCONTINUED | OUTPATIENT
Start: 2019-05-14 | End: 2019-05-16

## 2019-05-14 RX ORDER — LIDOCAINE HYDROCHLORIDE 10 MG/ML
0.1 INJECTION, SOLUTION EPIDURAL; INFILTRATION; INTRACAUDAL; PERINEURAL AS NEEDED
Status: DISCONTINUED | OUTPATIENT
Start: 2019-05-14 | End: 2019-05-14

## 2019-05-14 RX ORDER — PROTAMINE SULFATE 10 MG/ML
INJECTION, SOLUTION INTRAVENOUS AS NEEDED
Status: DISCONTINUED | OUTPATIENT
Start: 2019-05-14 | End: 2019-05-14 | Stop reason: HOSPADM

## 2019-05-14 RX ORDER — ONDANSETRON 2 MG/ML
4 INJECTION INTRAMUSCULAR; INTRAVENOUS
Status: DISCONTINUED | OUTPATIENT
Start: 2019-05-14 | End: 2019-05-20 | Stop reason: HOSPADM

## 2019-05-14 RX ORDER — ROCURONIUM BROMIDE 10 MG/ML
INJECTION, SOLUTION INTRAVENOUS AS NEEDED
Status: DISCONTINUED | OUTPATIENT
Start: 2019-05-14 | End: 2019-05-14 | Stop reason: HOSPADM

## 2019-05-14 RX ORDER — GUAIFENESIN 100 MG/5ML
81 LIQUID (ML) ORAL DAILY
Status: DISCONTINUED | OUTPATIENT
Start: 2019-05-15 | End: 2019-05-20 | Stop reason: HOSPADM

## 2019-05-14 RX ORDER — POTASSIUM CHLORIDE 29.8 MG/ML
20 INJECTION INTRAVENOUS
Status: COMPLETED | OUTPATIENT
Start: 2019-05-14 | End: 2019-05-14

## 2019-05-14 RX ORDER — AMOXICILLIN 250 MG
1 CAPSULE ORAL 2 TIMES DAILY
Status: DISCONTINUED | OUTPATIENT
Start: 2019-05-15 | End: 2019-05-20 | Stop reason: HOSPADM

## 2019-05-14 RX ORDER — MIDAZOLAM HYDROCHLORIDE 1 MG/ML
1 INJECTION, SOLUTION INTRAMUSCULAR; INTRAVENOUS AS NEEDED
Status: DISCONTINUED | OUTPATIENT
Start: 2019-05-14 | End: 2019-05-14

## 2019-05-14 RX ORDER — CEFAZOLIN SODIUM/WATER 2 G/20 ML
2 SYRINGE (ML) INTRAVENOUS EVERY 6 HOURS
Status: COMPLETED | OUTPATIENT
Start: 2019-05-14 | End: 2019-05-16

## 2019-05-14 RX ORDER — DEXTROSE 50 % IN WATER (D50W) INTRAVENOUS SYRINGE
12.5-25 AS NEEDED
Status: DISCONTINUED | OUTPATIENT
Start: 2019-05-14 | End: 2019-05-20 | Stop reason: HOSPADM

## 2019-05-14 RX ORDER — SODIUM CHLORIDE, SODIUM LACTATE, POTASSIUM CHLORIDE, CALCIUM CHLORIDE 600; 310; 30; 20 MG/100ML; MG/100ML; MG/100ML; MG/100ML
INJECTION, SOLUTION INTRAVENOUS
Status: DISCONTINUED | OUTPATIENT
Start: 2019-05-14 | End: 2019-05-14 | Stop reason: HOSPADM

## 2019-05-14 RX ORDER — DEXMEDETOMIDINE HYDROCHLORIDE 4 UG/ML
INJECTION, SOLUTION INTRAVENOUS
Status: DISCONTINUED | OUTPATIENT
Start: 2019-05-14 | End: 2019-05-14 | Stop reason: HOSPADM

## 2019-05-14 RX ORDER — POLYETHYLENE GLYCOL 3350 17 G/17G
17 POWDER, FOR SOLUTION ORAL DAILY
Status: DISCONTINUED | OUTPATIENT
Start: 2019-05-15 | End: 2019-05-20 | Stop reason: HOSPADM

## 2019-05-14 RX ORDER — MORPHINE SULFATE 4 MG/ML
INJECTION, SOLUTION INTRAMUSCULAR; INTRAVENOUS AS NEEDED
Status: DISCONTINUED | OUTPATIENT
Start: 2019-05-14 | End: 2019-05-14 | Stop reason: HOSPADM

## 2019-05-14 RX ORDER — LANOLIN ALCOHOL/MO/W.PET/CERES
400 CREAM (GRAM) TOPICAL 2 TIMES DAILY
Status: DISCONTINUED | OUTPATIENT
Start: 2019-05-15 | End: 2019-05-17

## 2019-05-14 RX ORDER — SODIUM CHLORIDE 9 MG/ML
9 INJECTION, SOLUTION INTRAVENOUS CONTINUOUS
Status: DISCONTINUED | OUTPATIENT
Start: 2019-05-14 | End: 2019-05-16

## 2019-05-14 RX ORDER — DIPHENHYDRAMINE HYDROCHLORIDE 50 MG/ML
25 INJECTION, SOLUTION INTRAMUSCULAR; INTRAVENOUS
Status: DISCONTINUED | OUTPATIENT
Start: 2019-05-14 | End: 2019-05-15

## 2019-05-14 RX ORDER — INSULIN GLARGINE 100 [IU]/ML
1-50 INJECTION, SOLUTION SUBCUTANEOUS
Status: ACTIVE | OUTPATIENT
Start: 2019-05-14 | End: 2019-05-15

## 2019-05-14 RX ORDER — ACETAMINOPHEN 325 MG/1
650 TABLET ORAL EVERY 4 HOURS
Status: DISPENSED | OUTPATIENT
Start: 2019-05-14 | End: 2019-05-16

## 2019-05-14 RX ORDER — LANOLIN ALCOHOL/MO/W.PET/CERES
3 CREAM (GRAM) TOPICAL
Status: DISCONTINUED | OUTPATIENT
Start: 2019-05-14 | End: 2019-05-20 | Stop reason: HOSPADM

## 2019-05-14 RX ORDER — MIDAZOLAM HYDROCHLORIDE 1 MG/ML
1 INJECTION, SOLUTION INTRAMUSCULAR; INTRAVENOUS
Status: DISCONTINUED | OUTPATIENT
Start: 2019-05-14 | End: 2019-05-17

## 2019-05-14 RX ORDER — ALBUMIN HUMAN 50 G/1000ML
12.5 SOLUTION INTRAVENOUS
Status: COMPLETED | OUTPATIENT
Start: 2019-05-14 | End: 2019-05-15

## 2019-05-14 RX ORDER — FENTANYL CITRATE 50 UG/ML
50 INJECTION, SOLUTION INTRAMUSCULAR; INTRAVENOUS AS NEEDED
Status: DISCONTINUED | OUTPATIENT
Start: 2019-05-14 | End: 2019-05-14

## 2019-05-14 RX ORDER — MAGNESIUM SULFATE 1 G/100ML
1 INJECTION INTRAVENOUS AS NEEDED
Status: DISCONTINUED | OUTPATIENT
Start: 2019-05-14 | End: 2019-05-16

## 2019-05-14 RX ORDER — HYDROCORTISONE SODIUM SUCCINATE 100 MG/2ML
INJECTION, POWDER, FOR SOLUTION INTRAMUSCULAR; INTRAVENOUS AS NEEDED
Status: DISCONTINUED | OUTPATIENT
Start: 2019-05-14 | End: 2019-05-14 | Stop reason: HOSPADM

## 2019-05-14 RX ORDER — MORPHINE SULFATE 4 MG/ML
4 INJECTION, SOLUTION INTRAMUSCULAR; INTRAVENOUS
Status: DISCONTINUED | OUTPATIENT
Start: 2019-05-14 | End: 2019-05-17

## 2019-05-14 RX ORDER — CEFAZOLIN SODIUM 1 G/3ML
INJECTION, POWDER, FOR SOLUTION INTRAMUSCULAR; INTRAVENOUS AS NEEDED
Status: DISCONTINUED | OUTPATIENT
Start: 2019-05-14 | End: 2019-05-14 | Stop reason: HOSPADM

## 2019-05-14 RX ORDER — FAMOTIDINE 20 MG/1
20 TABLET, FILM COATED ORAL EVERY 12 HOURS
Status: DISCONTINUED | OUTPATIENT
Start: 2019-05-15 | End: 2019-05-20 | Stop reason: HOSPADM

## 2019-05-14 RX ORDER — SODIUM CHLORIDE 0.9 % (FLUSH) 0.9 %
5-40 SYRINGE (ML) INJECTION AS NEEDED
Status: DISCONTINUED | OUTPATIENT
Start: 2019-05-14 | End: 2019-05-14

## 2019-05-14 RX ORDER — MUPIROCIN 20 MG/G
OINTMENT TOPICAL 2 TIMES DAILY
Status: DISPENSED | OUTPATIENT
Start: 2019-05-14 | End: 2019-05-19

## 2019-05-14 RX ORDER — MIDAZOLAM HYDROCHLORIDE 1 MG/ML
INJECTION, SOLUTION INTRAMUSCULAR; INTRAVENOUS AS NEEDED
Status: DISCONTINUED | OUTPATIENT
Start: 2019-05-14 | End: 2019-05-14 | Stop reason: HOSPADM

## 2019-05-14 RX ORDER — BACITRACIN 500 UNIT/G
1 PACKET (EA) TOPICAL AS NEEDED
Status: DISCONTINUED | OUTPATIENT
Start: 2019-05-14 | End: 2019-05-20 | Stop reason: HOSPADM

## 2019-05-14 RX ORDER — SODIUM CHLORIDE 9 MG/ML
INJECTION, SOLUTION INTRAVENOUS
Status: DISCONTINUED | OUTPATIENT
Start: 2019-05-14 | End: 2019-05-14 | Stop reason: HOSPADM

## 2019-05-14 RX ORDER — PAPAVERINE HYDROCHLORIDE 30 MG/ML
INJECTION INTRAMUSCULAR; INTRAVENOUS AS NEEDED
Status: DISCONTINUED | OUTPATIENT
Start: 2019-05-14 | End: 2019-05-14 | Stop reason: HOSPADM

## 2019-05-14 RX ORDER — SODIUM CHLORIDE 450 MG/100ML
10 INJECTION, SOLUTION INTRAVENOUS CONTINUOUS
Status: DISCONTINUED | OUTPATIENT
Start: 2019-05-14 | End: 2019-05-16

## 2019-05-14 RX ORDER — NALOXONE HYDROCHLORIDE 0.4 MG/ML
0.4 INJECTION, SOLUTION INTRAMUSCULAR; INTRAVENOUS; SUBCUTANEOUS AS NEEDED
Status: DISCONTINUED | OUTPATIENT
Start: 2019-05-14 | End: 2019-05-20 | Stop reason: HOSPADM

## 2019-05-14 RX ORDER — MAGNESIUM SULFATE 100 %
4 CRYSTALS MISCELLANEOUS AS NEEDED
Status: DISCONTINUED | OUTPATIENT
Start: 2019-05-14 | End: 2019-05-20 | Stop reason: HOSPADM

## 2019-05-14 RX ADMIN — DEXMEDETOMIDINE HYDROCHLORIDE 0.5 MCG/KG/HR: 4 INJECTION, SOLUTION INTRAVENOUS at 15:34

## 2019-05-14 RX ADMIN — Medication 10 ML: at 21:14

## 2019-05-14 RX ADMIN — ONDANSETRON 4 MG: 2 INJECTION INTRAMUSCULAR; INTRAVENOUS at 16:09

## 2019-05-14 RX ADMIN — AMIODARONE HYDROCHLORIDE 1 MG/MIN: 50 INJECTION, SOLUTION INTRAVENOUS at 15:50

## 2019-05-14 RX ADMIN — MONTELUKAST SODIUM 10 MG: 10 TABLET, FILM COATED ORAL at 21:10

## 2019-05-14 RX ADMIN — SUFENTANIL CITRATE 0.25 MCG/KG/HR: 50 INJECTION EPIDURAL; INTRAVENOUS at 14:18

## 2019-05-14 RX ADMIN — SODIUM CHLORIDE, SODIUM LACTATE, POTASSIUM CHLORIDE, CALCIUM CHLORIDE: 600; 310; 30; 20 INJECTION, SOLUTION INTRAVENOUS at 13:00

## 2019-05-14 RX ADMIN — Medication 2 G: at 21:11

## 2019-05-14 RX ADMIN — ARFORMOTEROL TARTRATE 15 MCG: 15 SOLUTION RESPIRATORY (INHALATION) at 20:21

## 2019-05-14 RX ADMIN — TAMSULOSIN HYDROCHLORIDE 0.8 MG: 0.4 CAPSULE ORAL at 08:06

## 2019-05-14 RX ADMIN — DEXTROSE MONOHYDRATE 5.5 G: 500 INJECTION PARENTERAL at 20:50

## 2019-05-14 RX ADMIN — SUFENTANIL CITRATE 25 MCG: 50 INJECTION EPIDURAL; INTRAVENOUS at 15:40

## 2019-05-14 RX ADMIN — BENZONATATE 100 MG: 100 CAPSULE ORAL at 08:14

## 2019-05-14 RX ADMIN — Medication 10 ML: at 18:47

## 2019-05-14 RX ADMIN — AMIODARONE HYDROCHLORIDE 1 MG/MIN: 50 INJECTION, SOLUTION INTRAVENOUS at 21:42

## 2019-05-14 RX ADMIN — BUDESONIDE 500 MCG: 0.5 INHALANT RESPIRATORY (INHALATION) at 20:21

## 2019-05-14 RX ADMIN — Medication 10 ML: at 06:22

## 2019-05-14 RX ADMIN — MORPHINE SULFATE 4 MG: 4 INJECTION, SOLUTION INTRAMUSCULAR; INTRAVENOUS at 16:23

## 2019-05-14 RX ADMIN — ASPIRIN 81 MG 81 MG: 81 TABLET ORAL at 08:06

## 2019-05-14 RX ADMIN — POTASSIUM CHLORIDE 20 MEQ: 400 INJECTION, SOLUTION INTRAVENOUS at 18:44

## 2019-05-14 RX ADMIN — SUCCINYLCHOLINE CHLORIDE 140 MG: 20 INJECTION INTRAMUSCULAR; INTRAVENOUS at 13:10

## 2019-05-14 RX ADMIN — SODIUM CHLORIDE 10 ML/HR: 450 INJECTION, SOLUTION INTRAVENOUS at 17:00

## 2019-05-14 RX ADMIN — CHLORHEXIDINE GLUCONATE 15 ML: 1.2 RINSE ORAL at 21:13

## 2019-05-14 RX ADMIN — FENTANYL CITRATE 100 MCG: 50 INJECTION, SOLUTION INTRAMUSCULAR; INTRAVENOUS at 10:30

## 2019-05-14 RX ADMIN — MUPIROCIN: 20 OINTMENT TOPICAL at 19:12

## 2019-05-14 RX ADMIN — ALBUMIN (HUMAN) 12.5 G: 12.5 INJECTION, SOLUTION INTRAVENOUS at 17:17

## 2019-05-14 RX ADMIN — HYDROCORTISONE SODIUM SUCCINATE 100 MG: 100 INJECTION, POWDER, FOR SOLUTION INTRAMUSCULAR; INTRAVENOUS at 13:10

## 2019-05-14 RX ADMIN — ROCURONIUM BROMIDE 5 MG: 10 INJECTION, SOLUTION INTRAVENOUS at 13:09

## 2019-05-14 RX ADMIN — ACETAMINOPHEN 650 MG: 325 TABLET ORAL at 21:10

## 2019-05-14 RX ADMIN — HEPARIN SODIUM 29000 UNITS: 1000 INJECTION, SOLUTION INTRAVENOUS; SUBCUTANEOUS at 14:00

## 2019-05-14 RX ADMIN — PROTAMINE SULFATE 250 MG: 10 INJECTION, SOLUTION INTRAVENOUS at 15:30

## 2019-05-14 RX ADMIN — CEFAZOLIN SODIUM 2 G: 1 INJECTION, POWDER, FOR SOLUTION INTRAMUSCULAR; INTRAVENOUS at 13:26

## 2019-05-14 RX ADMIN — SUFENTANIL CITRATE 25 MCG: 50 INJECTION EPIDURAL; INTRAVENOUS at 13:09

## 2019-05-14 RX ADMIN — Medication 40 ML: at 18:47

## 2019-05-14 RX ADMIN — ALBUMIN (HUMAN) 12.5 G: 12.5 INJECTION, SOLUTION INTRAVENOUS at 23:35

## 2019-05-14 RX ADMIN — SODIUM CHLORIDE: 9 INJECTION, SOLUTION INTRAVENOUS at 13:09

## 2019-05-14 RX ADMIN — SUFENTANIL CITRATE 25 MCG: 50 INJECTION EPIDURAL; INTRAVENOUS at 14:18

## 2019-05-14 RX ADMIN — MIDAZOLAM 4 MG: 1 INJECTION INTRAMUSCULAR; INTRAVENOUS at 10:30

## 2019-05-14 RX ADMIN — ARFORMOTEROL TARTRATE 15 MCG: 15 SOLUTION RESPIRATORY (INHALATION) at 07:51

## 2019-05-14 RX ADMIN — MIDAZOLAM HYDROCHLORIDE 2 MG: 1 INJECTION, SOLUTION INTRAMUSCULAR; INTRAVENOUS at 13:05

## 2019-05-14 RX ADMIN — MIDAZOLAM HYDROCHLORIDE 3 MG: 1 INJECTION, SOLUTION INTRAMUSCULAR; INTRAVENOUS at 13:00

## 2019-05-14 RX ADMIN — METOPROLOL TARTRATE 12.5 MG: 25 TABLET ORAL at 08:06

## 2019-05-14 RX ADMIN — FLUTICASONE PROPIONATE 2 SPRAY: 50 SPRAY, METERED NASAL at 08:08

## 2019-05-14 RX ADMIN — FINASTERIDE 5 MG: 5 TABLET, FILM COATED ORAL at 08:14

## 2019-05-14 RX ADMIN — ROCURONIUM BROMIDE 45 MG: 10 INJECTION, SOLUTION INTRAVENOUS at 13:15

## 2019-05-14 RX ADMIN — ATORVASTATIN CALCIUM 40 MG: 40 TABLET, FILM COATED ORAL at 08:06

## 2019-05-14 RX ADMIN — MORPHINE SULFATE 2 MG: 2 INJECTION, SOLUTION INTRAMUSCULAR; INTRAVENOUS at 17:12

## 2019-05-14 RX ADMIN — CHLORHEXIDINE GLUCONATE 10 ML: 1.2 RINSE ORAL at 19:10

## 2019-05-14 RX ADMIN — HEPARIN SODIUM 2000 UNITS: 1000 INJECTION, SOLUTION INTRAVENOUS; SUBCUTANEOUS at 13:46

## 2019-05-14 RX ADMIN — SUFENTANIL CITRATE 25 MCG: 50 INJECTION EPIDURAL; INTRAVENOUS at 13:15

## 2019-05-14 RX ADMIN — BENZONATATE 100 MG: 100 CAPSULE ORAL at 21:10

## 2019-05-14 RX ADMIN — CHLORHEXIDINE GLUCONATE 15 ML: 1.2 RINSE ORAL at 08:07

## 2019-05-14 RX ADMIN — FAMOTIDINE 20 MG: 10 INJECTION, SOLUTION INTRAVENOUS at 21:10

## 2019-05-14 RX ADMIN — BUDESONIDE 500 MCG: 0.5 INHALANT RESPIRATORY (INHALATION) at 07:51

## 2019-05-14 RX ADMIN — GUAIFENESIN 600 MG: 600 TABLET, EXTENDED RELEASE ORAL at 08:14

## 2019-05-14 RX ADMIN — CETIRIZINE HYDROCHLORIDE 10 MG: 10 TABLET, FILM COATED ORAL at 08:06

## 2019-05-14 RX ADMIN — CEFAZOLIN SODIUM 2 G: 1 INJECTION, POWDER, FOR SOLUTION INTRAMUSCULAR; INTRAVENOUS at 16:20

## 2019-05-14 NOTE — PROGRESS NOTES
1640 - Patient arrived into CVICU from OR with OR team, Dr Crystal Smith , and SANDRO PA. Patient intubated and sedated with Insulin, Amiodarone, and Precedex gtts infusing, current 's Phenylephrine gtt stopped. . Report received from CRNA at the bedside. Chest tube drainage so far 45mL. Labs, chest x-ray, and assessment completed. 1700 - CO2 - 52.6, pH 7.26. RR increased to 14 by RT.  
1730 Scot Lerma, Care Coordinator at bedside with family. Update given, privacy code given. 1745 - PA catheter migrated, Dr. Crystal Smith updated. Orders to d/c [place] 
1755 - Pt waking up calmly, nodding appropriately. RT called to decrease RR 
1815 - RT called to place pt on CPAP 
1822 - Pt on CPAP.  
1925 - Pt extubated to 4L NC. No stridor noted. Voice soft and clear. 1945 - Bedside shift change report given to Dinah Garcia RN (oncoming nurse) by Deyvi Bae RN (offgoing nurse). Report included the following information SBAR, Kardex, OR Summary, Procedure Summary, Intake/Output, MAR, Recent Results and Cardiac Rhythm NSR.

## 2019-05-14 NOTE — BRIEF OP NOTE
BRIEF OP NOTE Pre-Op Diagnosis: CORONARY ARTERY DISEASE Post-Op Diagnosis: CORONARY ARTERY DISEASE Procedure:  CABG X 3 LIMA to LAD, RSVG to RAMUS, RSVG to RCA 
           RIGHT ENDOSCOPIC SAPHENOUS VEIN HARVEST 
 
EV[facility] Time:  19min Surgeon: Simin Muro MD 
 
Assistant(s): Abdirahman Goldberg PA-C Anesthesia: General  
 
Infusions: Amiodarone, precedex, insulin, katelynn Estimated Blood Loss:  600cc Cell Saver:  675cc Specimens: * No specimens in log * Drains and pacing wires: 2 atrial wires, 1 bipolar ventricular wire, 3 keke drains Complications:   none Findings: CAD Implants: * No implants in log *

## 2019-05-14 NOTE — PERIOP NOTES
Patient pre-assessment performed in holding. Pt confirmed procedure, allergies, implants and NPO status.

## 2019-05-14 NOTE — PROGRESS NOTES
Cardiac Surgery Care Coordinator- Met with Mr and Mrs Nisha Mayes, reviewed plan of care and offered emotional support. Reviewed pre-op education to include day of surgery and immediate post op expectations. Discussed pain scale and reviewed pain medications. Reviewed potential day of discharge. Will continue to follow. 1000- Met with Mrs Nisha Mayes, provided update. 36- Escorted Mrs Nisha Mayes to the pre-op holding area, reviewed day of surgery expectations for the patient and his family. Will continue to follow. 1400- Met with the family in the main surgical area, provided update and offered emotional support. 1600- Met with Saji Infante family and Dr. Hiro Reaves. Update given, Encouraged family to verbalize and offered emotional support. Reinforced Surgical waiting room instructions, family to wait in the main surgical waiting room until contacted by the nursing staff. 65- Escorted family to the CVICU waiting room, bedside nurse aware of their location.  Nicky Alexander RN

## 2019-05-14 NOTE — PERIOP NOTES
Report called to [facility], CVICU RN. Information given to include patient's name, age, allergies, height, weight, PMH, invasive lines, procedure, drains and anesthesia drips.

## 2019-05-14 NOTE — PROGRESS NOTES
The patient has been authorized for home health services with [facility] following CABG- anticipated CABG 5/15. CM will continue to follow.  MICHELLE Patterson

## 2019-05-14 NOTE — ANESTHESIA PROCEDURE NOTES
CÉSAR        Procedure Details: probe placement, image aquisition & interpretation    Risks and benefits discussed with the patient and plans are to proceed    Procedure Note    Performed by: Marcie Mckoy MD  Authorized by: Marcie Mckoy MD       Indications: assessment of ascending aorta and assessment of surgical repair  Modalities: 2D, CF, CWD, PWD  Probe Type: epiaortic and multiplane  Insertion: atraumatic  Patient Status: intubated and sedated    Echocardiographic and Doppler Measurements   Aorta  Size  Diam(cm)  Dissection PlaqueThick(mm)  Plaque Mobile    Ascending normal  No 0-3 No    Arch normal  No 0-3 No    Descending normal  No 0-3 No          Valves  Annulus  Stenosis  Area/Grad  Regurg  Leaflet   Morph  Leaflet   Motion    Aortic normal none  0 normal normal    Mitral normal none  1+ normal normal    Tricuspid normal none  0 normal normal          Atria  Size  SEC (smoke)  Thrombus  Tumor  Device    Rt Atrium normal No No No Yes    Lt Atrium normal No No No No     Interatrial Septum Morphology: normal    Interventricular Septum Morphology: normal    Ventricle  Cavity Size  Cavity Dimension Hypertrophy  Thrombus  Gloal FXN  EF    RV normal  No no normal     LV normal   No normal 65%       Regional Function  (1 = normal, 2 = mildly hypokinetic, 3 = severely hypokinetic, 4 = akinetic, 5 = dyskinetic) LAV - [place]   ME LAV = 0  ME LAV = 90  ME LAV = 130   Basal Sept:1 Basal Ant:1 Basal Post:1   Mid Sept:1 Mid Ant:1 Mid Post:1   Apical Sept:1 Apical Ant:1 Basal Ant Sept:1   Basal Lat:1 Basal Inf:1 Mid Ant Sept:1   Mid Lat:1 Mid Inf:1    Apical Lat:1 Apical Inf:1        Pericardium: normal    Post Intervention Follow-up Study  Ventricular Global Function: unchanged  Ventricular Regional Function: unchanged     Valve  Function  Regurgitation  Area    Aortic no change      Mitral no change      Tricuspid no change      Prosthetic        Complications: None  Comments: Epiaortic:  Small plaque lateral posterior ascending aorta

## 2019-05-14 NOTE — PROGRESS NOTES
14 [address], [facility] [address] 
[phone] Patient was in the OR all afternoon for CABG. UGI series (5/13/19) showed a small hiatal hernia; no mass or obstructing lesion; normal esophageal motility; no reflux demonstrated. Plan for outpatient follow up and discussion of possible EGD at that time. We will sign off and be available again as needed. Please call us with any questions. Thank you.  
  
 
WAGNER Gan

## 2019-05-14 NOTE — PROGRESS NOTES
0730:  Bedside shift change report given to Allen Ortiz (oncoming nurse) by Lily Roger (offgoing nurse). Report included the following information SBAR, Kardex, MAR and Recent Results. 0940:  Patient taken to OR for ordered procedure. Pt kept on telemetry monitor and RN at bedside.

## 2019-05-14 NOTE — ANESTHESIA PREPROCEDURE EVALUATION
Relevant Problems No relevant active problems Anesthetic History No history of anesthetic complications Comments: Urinary retention Review of Systems / Medical History Patient summary reviewed, nursing notes reviewed and pertinent labs reviewed Pulmonary Asthma Neuro/Psych Within defined limits Cardiovascular CAD and PAD Exercise tolerance: <4 METS 
  
GI/Hepatic/Renal 
Within defined limits Endo/Other Arthritis Other Findings Physical Exam 
 
Airway Mallampati: II 
TM Distance: > 6 cm Neck ROM: normal range of motion Mouth opening: Normal 
 
 Cardiovascular Regular rate and rhythm,  S1 and S2 normal,  no murmur, click, rub, or gallop Dental 
No notable dental hx Pulmonary Breath sounds clear to auscultation Abdominal 
GI exam deferred Other Findings Anesthetic Plan ASA: 4 Anesthesia type: general 
 
Monitoring Plan: Arterial line, BIS, CVP, [place] and CÉSAR Induction: Intravenous Anesthetic plan and risks discussed with: Patient

## 2019-05-14 NOTE — ANESTHESIA PROCEDURE NOTES
Arterial Line Placement    Start time: 5/14/2019 10:04 AM  End time: 5/14/2019 10:09 AM  Performed by: Brayan Hernandez MD  Authorized by: Brayan Hernandez MD     Pre-Procedure  Indications:  Arterial pressure monitoring and blood sampling  Preanesthetic Checklist: patient identified, risks and benefits discussed, anesthesia consent, patient being monitored, timeout performed and patient being monitored      Procedure:   Prep:  Chlorhexidine  Seldinger Technique?: Yes    Orientation:  Right  Location:  Radial artery  Catheter size:  20 G  Number of attempts:  1  Cont Cardiac Output Sensor: No      Assessment:   Post-procedure:  Line secured and sterile dressing applied  Patient Tolerance:  Patient tolerated the procedure well with no immediate complications

## 2019-05-14 NOTE — ANESTHESIA PROCEDURE NOTES
Central Line and [place] catheter Placement    Start time: 5/14/2019 10:01 AM  End time: 5/14/2019 10:20 AM  Performed by: Keisha Ash CRNA  Authorized by: Rony Garcia MD     Indications: vascular access, central pressure monitoring and need for vasopressors  Preanesthetic Checklist: patient identified, risks and benefits discussed, patient being monitored and timeout performed      Pre-procedure: All elements of maximal sterile barrier technique followed?  Yes    2% Chlorhexidine for cutaneous antisepsis, Hand hygiene performed prior to catheter insertion and Ultrasound guidance    Sterile Ultrasound Technique followed?: Yes            Procedure:   Prep:  Chlorhexidine  Location:  Internal jugular  Orientation:  Right  Patient position:  Trendelenburg  Catheter type:  Double lumen  Catheter size:  9 Fr  Catheter length:  16 cm  Number of attempts:  1  Successful placement: Yes      Assessment:   Post-procedure:  Catheter secured and sterile dressing applied  Assessment:  Blood return through all ports  Insertion:  Uncomplicated  Patient tolerance:  Patient tolerated the procedure well with no immediate complications  8fr CCO PAC

## 2019-05-15 ENCOUNTER — APPOINTMENT (OUTPATIENT)
Dept: GENERAL RADIOLOGY | Age: 74
DRG: 234 | End: 2019-05-15
Attending: PHYSICIAN ASSISTANT
Payer: MEDICARE

## 2019-05-15 LAB
ABO + RH BLD: NORMAL
ADMINISTERED INITIALS, ADMINIT: NORMAL
ALBUMIN SERPL-MCNC: 3.1 G/DL (ref 3.5–5)
ALBUMIN/GLOB SERPL: 1.3 {RATIO} (ref 1.1–2.2)
ALP SERPL-CCNC: 62 U/L (ref 45–117)
ALT SERPL-CCNC: 21 U/L (ref 12–78)
ANION GAP SERPL CALC-SCNC: 6 MMOL/L (ref 5–15)
AST SERPL-CCNC: 27 U/L (ref 15–37)
ATRIAL RATE: 51 BPM
BILIRUB SERPL-MCNC: 0.5 MG/DL (ref 0.2–1)
BLD PROD TYP BPU: NORMAL
BLD PROD TYP BPU: NORMAL
BLOOD GROUP ANTIBODIES SERPL: NORMAL
BPU ID: NORMAL
BPU ID: NORMAL
BUN SERPL-MCNC: 21 MG/DL (ref 6–20)
BUN/CREAT SERPL: 22 (ref 12–20)
CALCIUM SERPL-MCNC: 8.2 MG/DL (ref 8.5–10.1)
CALCULATED P AXIS, ECG09: 84 DEGREES
CALCULATED R AXIS, ECG10: -39 DEGREES
CALCULATED T AXIS, ECG11: 18 DEGREES
CHLORIDE SERPL-SCNC: 112 MMOL/L (ref 97–108)
CO2 SERPL-SCNC: 24 MMOL/L (ref 21–32)
CREAT SERPL-MCNC: 0.94 MG/DL (ref 0.7–1.3)
CROSSMATCH RESULT,%XM: NORMAL
CROSSMATCH RESULT,%XM: NORMAL
D50 ADMINISTERED, D50ADM: 0 ML
D50 ORDER, D50ORD: 0 ML
DIAGNOSIS, 93000: NORMAL
ERYTHROCYTE [DISTWIDTH] IN BLOOD BY AUTOMATED COUNT: 12.5 % (ref 11.5–14.5)
GLOBULIN SER CALC-MCNC: 2.4 G/DL (ref 2–4)
GLSCOM COMMENTS: NORMAL
GLUCOSE BLD STRIP.AUTO-MCNC: 100 MG/DL (ref 65–100)
GLUCOSE BLD STRIP.AUTO-MCNC: 101 MG/DL (ref 65–100)
GLUCOSE BLD STRIP.AUTO-MCNC: 102 MG/DL (ref 65–100)
GLUCOSE BLD STRIP.AUTO-MCNC: 103 MG/DL (ref 65–100)
GLUCOSE BLD STRIP.AUTO-MCNC: 105 MG/DL (ref 65–100)
GLUCOSE BLD STRIP.AUTO-MCNC: 106 MG/DL (ref 65–100)
GLUCOSE BLD STRIP.AUTO-MCNC: 108 MG/DL (ref 65–100)
GLUCOSE BLD STRIP.AUTO-MCNC: 119 MG/DL (ref 65–100)
GLUCOSE BLD STRIP.AUTO-MCNC: 120 MG/DL (ref 65–100)
GLUCOSE BLD STRIP.AUTO-MCNC: 120 MG/DL (ref 65–100)
GLUCOSE BLD STRIP.AUTO-MCNC: 121 MG/DL (ref 65–100)
GLUCOSE BLD STRIP.AUTO-MCNC: 131 MG/DL (ref 65–100)
GLUCOSE BLD STRIP.AUTO-MCNC: 132 MG/DL (ref 65–100)
GLUCOSE BLD STRIP.AUTO-MCNC: 140 MG/DL (ref 65–100)
GLUCOSE BLD STRIP.AUTO-MCNC: 142 MG/DL (ref 65–100)
GLUCOSE BLD STRIP.AUTO-MCNC: 143 MG/DL (ref 65–100)
GLUCOSE BLD STRIP.AUTO-MCNC: 179 MG/DL (ref 65–100)
GLUCOSE BLD STRIP.AUTO-MCNC: 90 MG/DL (ref 65–100)
GLUCOSE BLD STRIP.AUTO-MCNC: 92 MG/DL (ref 65–100)
GLUCOSE BLD STRIP.AUTO-MCNC: 93 MG/DL (ref 65–100)
GLUCOSE BLD STRIP.AUTO-MCNC: 95 MG/DL (ref 65–100)
GLUCOSE SERPL-MCNC: 99 MG/DL (ref 65–100)
GLUCOSE, GLC: 100 MG/DL
GLUCOSE, GLC: 101 MG/DL
GLUCOSE, GLC: 102 MG/DL
GLUCOSE, GLC: 103 MG/DL
GLUCOSE, GLC: 105 MG/DL
GLUCOSE, GLC: 106 MG/DL
GLUCOSE, GLC: 108 MG/DL
GLUCOSE, GLC: 119 MG/DL
GLUCOSE, GLC: 120 MG/DL
GLUCOSE, GLC: 120 MG/DL
GLUCOSE, GLC: 121 MG/DL
GLUCOSE, GLC: 131 MG/DL
GLUCOSE, GLC: 132 MG/DL
GLUCOSE, GLC: 140 MG/DL
GLUCOSE, GLC: 143 MG/DL
GLUCOSE, GLC: 143 MG/DL
GLUCOSE, GLC: 179 MG/DL
GLUCOSE, GLC: 90 MG/DL
GLUCOSE, GLC: 92 MG/DL
GLUCOSE, GLC: 93 MG/DL
GLUCOSE, GLC: 95 MG/DL
HCT VFR BLD AUTO: 36.8 % (ref 36.6–50.3)
HGB BLD-MCNC: 12.1 G/DL (ref 12.1–17)
HIGH TARGET, HITG: 130 MG/DL
INSULIN ADMINSTERED, INSADM: 0 UNITS/HOUR
INSULIN ADMINSTERED, INSADM: 0.3 UNITS/HOUR
INSULIN ADMINSTERED, INSADM: 0.3 UNITS/HOUR
INSULIN ADMINSTERED, INSADM: 0.4 UNITS/HOUR
INSULIN ADMINSTERED, INSADM: 0.5 UNITS/HOUR
INSULIN ADMINSTERED, INSADM: 0.6 UNITS/HOUR
INSULIN ADMINSTERED, INSADM: 0.7 UNITS/HOUR
INSULIN ADMINSTERED, INSADM: 0.8 UNITS/HOUR
INSULIN ADMINSTERED, INSADM: 1 UNITS/HOUR
INSULIN ADMINSTERED, INSADM: 1.3 UNITS/HOUR
INSULIN ADMINSTERED, INSADM: 1.7 UNITS/HOUR
INSULIN ADMINSTERED, INSADM: 2 UNITS/HOUR
INSULIN ADMINSTERED, INSADM: 2.2 UNITS/HOUR
INSULIN ADMINSTERED, INSADM: 3 UNITS/HOUR
INSULIN ADMINSTERED, INSADM: 3.3 UNITS/HOUR
INSULIN ADMINSTERED, INSADM: 3.6 UNITS/HOUR
INSULIN ADMINSTERED, INSADM: 3.6 UNITS/HOUR
INSULIN ORDER, INSORD: 0 UNITS/HOUR
INSULIN ORDER, INSORD: 0.3 UNITS/HOUR
INSULIN ORDER, INSORD: 0.3 UNITS/HOUR
INSULIN ORDER, INSORD: 0.4 UNITS/HOUR
INSULIN ORDER, INSORD: 0.5 UNITS/HOUR
INSULIN ORDER, INSORD: 0.6 UNITS/HOUR
INSULIN ORDER, INSORD: 0.7 UNITS/HOUR
INSULIN ORDER, INSORD: 0.8 UNITS/HOUR
INSULIN ORDER, INSORD: 1 UNITS/HOUR
INSULIN ORDER, INSORD: 1.3 UNITS/HOUR
INSULIN ORDER, INSORD: 1.7 UNITS/HOUR
INSULIN ORDER, INSORD: 2 UNITS/HOUR
INSULIN ORDER, INSORD: 2.2 UNITS/HOUR
INSULIN ORDER, INSORD: 3 UNITS/HOUR
INSULIN ORDER, INSORD: 3.3 UNITS/HOUR
INSULIN ORDER, INSORD: 3.6 UNITS/HOUR
INSULIN ORDER, INSORD: 3.6 UNITS/HOUR
LOW TARGET, LOT: 95 MG/DL
MAGNESIUM SERPL-MCNC: 2.3 MG/DL (ref 1.6–2.4)
MCH RBC QN AUTO: 30.6 PG (ref 26–34)
MCHC RBC AUTO-ENTMCNC: 32.9 G/DL (ref 30–36.5)
MCV RBC AUTO: 92.9 FL (ref 80–99)
MINUTES UNTIL NEXT BG, NBG: 60 MIN
MULTIPLIER, MUL: 0
MULTIPLIER, MUL: 0.01
MULTIPLIER, MUL: 0.02
MULTIPLIER, MUL: 0.02
MULTIPLIER, MUL: 0.03
MULTIPLIER, MUL: 0.04
MULTIPLIER, MUL: 0.05
NRBC # BLD: 0 K/UL (ref 0–0.01)
NRBC BLD-RTO: 0 PER 100 WBC
ORDER INITIALS, ORDINIT: NORMAL
P-R INTERVAL, ECG05: 222 MS
PLATELET # BLD AUTO: 213 K/UL (ref 150–400)
PMV BLD AUTO: 10.4 FL (ref 8.9–12.9)
POTASSIUM SERPL-SCNC: 4.3 MMOL/L (ref 3.5–5.1)
PROT SERPL-MCNC: 5.5 G/DL (ref 6.4–8.2)
Q-T INTERVAL, ECG07: 414 MS
QRS DURATION, ECG06: 78 MS
QTC CALCULATION (BEZET), ECG08: 381 MS
RBC # BLD AUTO: 3.96 M/UL (ref 4.1–5.7)
SERVICE CMNT-IMP: ABNORMAL
SERVICE CMNT-IMP: NORMAL
SODIUM SERPL-SCNC: 142 MMOL/L (ref 136–145)
SPECIMEN EXP DATE BLD: NORMAL
STATUS OF UNIT,%ST: NORMAL
STATUS OF UNIT,%ST: NORMAL
UNIT DIVISION, %UDIV: 0
UNIT DIVISION, %UDIV: 0
VENTRICULAR RATE, ECG03: 51 BPM
WBC # BLD AUTO: 13.7 K/UL (ref 4.1–11.1)

## 2019-05-15 PROCEDURE — 94664 DEMO&/EVAL PT USE INHALER: CPT

## 2019-05-15 PROCEDURE — 94640 AIRWAY INHALATION TREATMENT: CPT

## 2019-05-15 PROCEDURE — 83735 ASSAY OF MAGNESIUM: CPT

## 2019-05-15 PROCEDURE — 74011636637 HC RX REV CODE- 636/637: Performed by: INTERNAL MEDICINE

## 2019-05-15 PROCEDURE — 77010033678 HC OXYGEN DAILY

## 2019-05-15 PROCEDURE — 74011250636 HC RX REV CODE- 250/636: Performed by: PHYSICIAN ASSISTANT

## 2019-05-15 PROCEDURE — 97161 PT EVAL LOW COMPLEX 20 MIN: CPT

## 2019-05-15 PROCEDURE — 71045 X-RAY EXAM CHEST 1 VIEW: CPT

## 2019-05-15 PROCEDURE — 93005 ELECTROCARDIOGRAM TRACING: CPT

## 2019-05-15 PROCEDURE — 74011000250 HC RX REV CODE- 250: Performed by: PHYSICIAN ASSISTANT

## 2019-05-15 PROCEDURE — 97535 SELF CARE MNGMENT TRAINING: CPT

## 2019-05-15 PROCEDURE — 74011250637 HC RX REV CODE- 250/637: Performed by: PHYSICIAN ASSISTANT

## 2019-05-15 PROCEDURE — 74011250636 HC RX REV CODE- 250/636: Performed by: THORACIC SURGERY (CARDIOTHORACIC VASCULAR SURGERY)

## 2019-05-15 PROCEDURE — 97165 OT EVAL LOW COMPLEX 30 MIN: CPT

## 2019-05-15 PROCEDURE — 36415 COLL VENOUS BLD VENIPUNCTURE: CPT

## 2019-05-15 PROCEDURE — 74011000258 HC RX REV CODE- 258: Performed by: PHYSICIAN ASSISTANT

## 2019-05-15 PROCEDURE — 74011250637 HC RX REV CODE- 250/637: Performed by: NURSE PRACTITIONER

## 2019-05-15 PROCEDURE — P9045 ALBUMIN (HUMAN), 5%, 250 ML: HCPCS | Performed by: PHYSICIAN ASSISTANT

## 2019-05-15 PROCEDURE — 82962 GLUCOSE BLOOD TEST: CPT

## 2019-05-15 PROCEDURE — 65610000003 HC RM ICU SURGICAL

## 2019-05-15 PROCEDURE — 80053 COMPREHEN METABOLIC PANEL: CPT

## 2019-05-15 PROCEDURE — 85027 COMPLETE CBC AUTOMATED: CPT

## 2019-05-15 PROCEDURE — 97110 THERAPEUTIC EXERCISES: CPT

## 2019-05-15 PROCEDURE — P9045 ALBUMIN (HUMAN), 5%, 250 ML: HCPCS | Performed by: THORACIC SURGERY (CARDIOTHORACIC VASCULAR SURGERY)

## 2019-05-15 PROCEDURE — 74011250637 HC RX REV CODE- 250/637: Performed by: INTERNAL MEDICINE

## 2019-05-15 PROCEDURE — 74011000250 HC RX REV CODE- 250: Performed by: NURSE PRACTITIONER

## 2019-05-15 PROCEDURE — 97530 THERAPEUTIC ACTIVITIES: CPT

## 2019-05-15 RX ORDER — ALBUMIN HUMAN 50 G/1000ML
SOLUTION INTRAVENOUS
Status: DISPENSED
Start: 2019-05-15 | End: 2019-05-15

## 2019-05-15 RX ORDER — ALBUMIN HUMAN 50 G/1000ML
12.5 SOLUTION INTRAVENOUS ONCE
Status: COMPLETED | OUTPATIENT
Start: 2019-05-15 | End: 2019-05-15

## 2019-05-15 RX ORDER — ALBUMIN HUMAN 50 G/1000ML
12.5 SOLUTION INTRAVENOUS ONCE
Status: ACTIVE | OUTPATIENT
Start: 2019-05-15 | End: 2019-05-15

## 2019-05-15 RX ORDER — ALBUMIN HUMAN 50 G/1000ML
25 SOLUTION INTRAVENOUS ONCE
Status: COMPLETED | OUTPATIENT
Start: 2019-05-15 | End: 2019-05-15

## 2019-05-15 RX ADMIN — OXYCODONE HYDROCHLORIDE AND ACETAMINOPHEN 2 TABLET: 5; 325 TABLET ORAL at 09:18

## 2019-05-15 RX ADMIN — SODIUM CHLORIDE 0.5 G: 900 INJECTION, SOLUTION INTRAVENOUS at 09:29

## 2019-05-15 RX ADMIN — MUPIROCIN: 20 OINTMENT TOPICAL at 09:26

## 2019-05-15 RX ADMIN — FAMOTIDINE 20 MG: 20 TABLET ORAL at 20:53

## 2019-05-15 RX ADMIN — MAGNESIUM OXIDE TAB 400 MG (241.3 MG ELEMENTAL MG) 400 MG: 400 (241.3 MG) TAB at 17:39

## 2019-05-15 RX ADMIN — BENZONATATE 100 MG: 100 CAPSULE ORAL at 09:18

## 2019-05-15 RX ADMIN — FAMOTIDINE 20 MG: 10 INJECTION, SOLUTION INTRAVENOUS at 09:23

## 2019-05-15 RX ADMIN — BUDESONIDE 500 MCG: 0.5 INHALANT RESPIRATORY (INHALATION) at 07:49

## 2019-05-15 RX ADMIN — AMIODARONE HYDROCHLORIDE 1 MG/MIN: 50 INJECTION, SOLUTION INTRAVENOUS at 04:39

## 2019-05-15 RX ADMIN — ACETAMINOPHEN 650 MG: 325 TABLET ORAL at 17:39

## 2019-05-15 RX ADMIN — Medication 10 ML: at 21:02

## 2019-05-15 RX ADMIN — AMIODARONE HYDROCHLORIDE 400 MG: 200 TABLET ORAL at 17:39

## 2019-05-15 RX ADMIN — BENZONATATE 100 MG: 100 CAPSULE ORAL at 17:39

## 2019-05-15 RX ADMIN — DIPHENHYDRAMINE HYDROCHLORIDE 25 MG: 25 CAPSULE ORAL at 21:01

## 2019-05-15 RX ADMIN — MUPIROCIN: 20 OINTMENT TOPICAL at 19:10

## 2019-05-15 RX ADMIN — FINASTERIDE 5 MG: 5 TABLET, FILM COATED ORAL at 10:08

## 2019-05-15 RX ADMIN — ACETAMINOPHEN 650 MG: 325 TABLET ORAL at 00:02

## 2019-05-15 RX ADMIN — ARFORMOTEROL TARTRATE 15 MCG: 15 SOLUTION RESPIRATORY (INHALATION) at 07:49

## 2019-05-15 RX ADMIN — CHLORHEXIDINE GLUCONATE 10 ML: 1.2 RINSE ORAL at 19:10

## 2019-05-15 RX ADMIN — Medication 2 G: at 09:19

## 2019-05-15 RX ADMIN — ALBUMIN (HUMAN) 25 G: 12.5 INJECTION, SOLUTION INTRAVENOUS at 09:16

## 2019-05-15 RX ADMIN — BUDESONIDE 500 MCG: 0.5 INHALANT RESPIRATORY (INHALATION) at 21:29

## 2019-05-15 RX ADMIN — OXYCODONE HYDROCHLORIDE AND ACETAMINOPHEN 2 TABLET: 5; 325 TABLET ORAL at 14:14

## 2019-05-15 RX ADMIN — SENNOSIDES, DOCUSATE SODIUM 1 TABLET: 50; 8.6 TABLET, FILM COATED ORAL at 09:18

## 2019-05-15 RX ADMIN — BENZONATATE 100 MG: 100 CAPSULE ORAL at 21:01

## 2019-05-15 RX ADMIN — ATORVASTATIN CALCIUM 40 MG: 40 TABLET, FILM COATED ORAL at 09:19

## 2019-05-15 RX ADMIN — PHENYLEPHRINE HYDROCHLORIDE 35 MCG/MIN: 10 INJECTION INTRAVENOUS at 14:24

## 2019-05-15 RX ADMIN — Medication 10 ML: at 05:39

## 2019-05-15 RX ADMIN — ACETAMINOPHEN 650 MG: 325 TABLET ORAL at 04:44

## 2019-05-15 RX ADMIN — Medication 10 ML: at 05:38

## 2019-05-15 RX ADMIN — SODIUM CHLORIDE 0.5 MCG/KG/HR: 900 INJECTION, SOLUTION INTRAVENOUS at 00:14

## 2019-05-15 RX ADMIN — ARFORMOTEROL TARTRATE 15 MCG: 15 SOLUTION RESPIRATORY (INHALATION) at 21:29

## 2019-05-15 RX ADMIN — MONTELUKAST SODIUM 10 MG: 10 TABLET, FILM COATED ORAL at 21:01

## 2019-05-15 RX ADMIN — Medication 3 MG: at 21:20

## 2019-05-15 RX ADMIN — Medication 2 G: at 17:40

## 2019-05-15 RX ADMIN — AMIODARONE HYDROCHLORIDE 0.5 MG/MIN: 50 INJECTION, SOLUTION INTRAVENOUS at 14:22

## 2019-05-15 RX ADMIN — Medication 2 G: at 04:30

## 2019-05-15 RX ADMIN — GUAIFENESIN 600 MG: 600 TABLET, EXTENDED RELEASE ORAL at 09:19

## 2019-05-15 RX ADMIN — Medication 2 G: at 21:46

## 2019-05-15 RX ADMIN — GUAIFENESIN 600 MG: 600 TABLET, EXTENDED RELEASE ORAL at 20:53

## 2019-05-15 RX ADMIN — CHLORHEXIDINE GLUCONATE 10 ML: 1.2 RINSE ORAL at 09:26

## 2019-05-15 RX ADMIN — OXYCODONE AND ACETAMINOPHEN 1 TABLET: 5; 325 TABLET ORAL at 04:12

## 2019-05-15 RX ADMIN — POLYETHYLENE GLYCOL 3350 17 G: 17 POWDER, FOR SOLUTION ORAL at 09:23

## 2019-05-15 RX ADMIN — PREDNISONE 40 MG: 20 TABLET ORAL at 14:06

## 2019-05-15 RX ADMIN — ALBUMIN (HUMAN) 12.5 G: 12.5 INJECTION, SOLUTION INTRAVENOUS at 14:00

## 2019-05-15 RX ADMIN — FLUTICASONE PROPIONATE 2 SPRAY: 50 SPRAY, METERED NASAL at 10:02

## 2019-05-15 RX ADMIN — ALBUMIN (HUMAN) 12.5 G: 12.5 INJECTION, SOLUTION INTRAVENOUS at 04:40

## 2019-05-15 RX ADMIN — CETIRIZINE HYDROCHLORIDE 10 MG: 10 TABLET, FILM COATED ORAL at 09:17

## 2019-05-15 RX ADMIN — TAMSULOSIN HYDROCHLORIDE 0.8 MG: 0.4 CAPSULE ORAL at 09:18

## 2019-05-15 RX ADMIN — ASPIRIN 81 MG 81 MG: 81 TABLET ORAL at 09:18

## 2019-05-15 RX ADMIN — SENNOSIDES, DOCUSATE SODIUM 1 TABLET: 50; 8.6 TABLET, FILM COATED ORAL at 17:39

## 2019-05-15 NOTE — PROGRESS NOTES
Name: Jihan Mariee: Rocael Muro  
: 1945 Admit Date: 2019 Phone: [phone]  Room: [phone] PCP: Patti Dong MD  MRN: [id number] Date: 5/15/2019  Code: Full Code HPI: 
 
Alert still with cough but productive of back sputum. Cough has been present for many months Records from out patient reviewed: 
-Positive skin allergy testing. Dust, willow, hackery, cottonwood, [employer], alternaria, elm, maple, ryegrass, walnut, sweetgum, wheat. 
-IgE 112 
-PFT: FEV1 1.49 L (56%) ==> post bd 1.74 L (65%) = 17% increase in FEV1. Air trapping. Normal dlco. 
 
Images: 
CT Chest reviewed - no endobronchial lesions. Some small nodules less than 4 mm. Past Medical History:  
Diagnosis Date  Arthritis  Asthma INHALER USED DAILY  Cancer ([address] SKIN  
 Other ill-defined conditions(799.89) HIGH CHOLESTEROL  
 Other ill-defined conditions(799.89) PVD  Other ill-defined conditions(799.89) OCCAS INSOMNIA Past Surgical History:  
Procedure Laterality Date  VASCULAR SURGERY PROCEDURE UNLIST    
 LEFT LEG, SFA ANGIOPLASTY History reviewed. No pertinent family history. Social History Tobacco Use  Smoking status: Current Every Day Smoker Packs/day: 0.75 Years: 50.00 Pack years: 37.50  Smokeless tobacco: Never Used Substance Use Topics  Alcohol use: Yes Alcohol/week: 2.5 oz Types: 5 drink(s) per week No Known Allergies Current Facility-Administered Medications Medication Dose Route Frequency  albumin human 5% (BUMINATE) 5 % solution  albumin human 5% (BUMINATE) solution 12.5 g  12.5 g IntraVENous ONCE  
 alteplase (CATHFLO) 1 mg in sterile water (preservative free) 1 mL injection  1 mg InterCATHeter PRN  
 bacitracin 500 unit/gram packet 1 Packet  1 Packet Topical PRN  
 sodium chloride (NS) flush 5-40 mL  5-40 mL IntraVENous Q8H  
  sodium chloride (NS) flush 5-40 mL  5-40 mL IntraVENous PRN  
 0.45% sodium chloride infusion  10 mL/hr IntraVENous CONTINUOUS  
 0.9% sodium chloride infusion  9 mL/hr IntraVENous CONTINUOUS  
 acetaminophen (TYLENOL) tablet 650 mg  650 mg Oral Q4H  
 oxyCODONE-acetaminophen (PERCOCET) 5-325 mg per tablet 1 Tab  1 Tab Oral Q4H PRN  
 oxyCODONE-acetaminophen (PERCOCET) 5-325 mg per tablet 2 Tab  2 Tab Oral Q4H PRN  
 morphine injection 4 mg  4 mg IntraVENous Q2H PRN  
 naloxone (NARCAN) injection 0.4 mg  0.4 mg IntraVENous PRN  
 mupirocin (BACTROBAN) 2 % ointment   Both Nostrils BID  ceFAZolin (ANCEF) 2 g/20 mL in sterile water IV syringe  2 g IntraVENous Q6H  
 amiodarone (CORDARONE) tablet 400 mg  400 mg Oral Q12H  
 ondansetron (ZOFRAN) injection 4 mg  4 mg IntraVENous Q4H PRN  
 albuterol (PROVENTIL VENTOLIN) nebulizer solution 2.5 mg  2.5 mg Nebulization Q4H PRN  
 aspirin chewable tablet 81 mg  81 mg Oral DAILY  midazolam (VERSED) injection 1 mg  1 mg IntraVENous Q1H PRN  chlorhexidine (PERIDEX) 0.12 % mouthwash 10 mL  10 mL Oral BID  famotidine (PEPCID) tablet 20 mg  20 mg Oral Q12H  
 magnesium oxide (MAG-OX) tablet 400 mg  400 mg Oral BID  calcium chloride 1 g in 0.9% sodium chloride 250 mL IVPB  1 g IntraVENous PRN  
 bisacodyl (DULCOLAX) suppository 10 mg  10 mg Rectal DAILY PRN  
 senna-docusate (PERICOLACE) 8.6-50 mg per tablet 1 Tab  1 Tab Oral BID  polyethylene glycol (MIRALAX) packet 17 g  17 g Oral DAILY  ELECTROLYTE REPLACEMENT PROTOCOL  1 Each Other PRN  
 magnesium sulfate 1 g/100 ml IVPB (premix or compounded)  1 g IntraVENous PRN  
 glucose chewable tablet 16 g  4 Tab Oral PRN  
 dextrose (D50W) injection syrg 12.5-25 g  12.5-25 g IntraVENous PRN  
 glucagon (GLUCAGEN) injection 1 mg  1 mg IntraMUSCular PRN  
 insulin lispro (HUMALOG) injection   SubCUTAneous TIDAC  insulin lispro (HUMALOG) injection   SubCUTAneous AC&HS  
  insulin glargine (LANTUS) injection 1-50 Units  1-50 Units SubCUTAneous ONCE PRN  potassium chloride 20 mEq in 50 ml IVPB  20 mEq IntraVENous Q2H PRN  
 morphine injection 2 mg  2 mg IntraVENous Q2H PRN  
 diphenhydrAMINE (BENADRYL) capsule 25 mg  25 mg Oral QHS PRN  
 melatonin tablet 3 mg  3 mg Oral QHS PRN  
 guaiFENesin ER (MUCINEX) tablet 600 mg  600 mg Oral Q12H  
 benzonatate (TESSALON) capsule 100 mg  100 mg Oral TID  insulin regular (NOVOLIN R, HUMULIN R) 100 Units in 0.9% sodium chloride 100 mL infusion  1-50 Units/hr IntraVENous TITRATE  PHENYLephrine (TATUM-SYNEPHRINE) 30 mg in 0.9% sodium chloride 250 mL infusion   mcg/min IntraVENous TITRATE  niCARdipine (CARDENE) 25 mg in 0.9% sodium chloride 250 mL infusion  0-15 mg/hr IntraVENous TITRATE  amiodarone (CORDARONE) 375 mg/250 mL D5W infusion  0.5-1 mg/min IntraVENous CONTINUOUS  
 tamsulosin (FLOMAX) capsule 0.8 mg  0.8 mg Oral DAILY  finasteride (PROSCAR) tablet 5 mg  5 mg Oral DAILY  sodium chloride (NS) flush 5-40 mL  5-40 mL IntraVENous Q8H  
 sodium chloride (NS) flush 5-40 mL  5-40 mL IntraVENous PRN  
 simethicone (MYLICON) 03YM/0.0HO oral drops 80 mg  1.2 mL Oral Multiple  predniSONE (DELTASONE) tablet 40 mg  40 mg Oral DAILY WITH LUNCH  
 montelukast (SINGULAIR) tablet 10 mg  10 mg Oral QHS  atorvastatin (LIPITOR) tablet 40 mg  40 mg Oral DAILY  fluticasone propionate (FLONASE) 50 mcg/actuation nasal spray 2 Spray  2 Spray Both Nostrils DAILY  arformoterol (BROVANA) neb solution 15 mcg  15 mcg Nebulization BID RT And  
 budesonide (PULMICORT) 500 mcg/2 ml nebulizer suspension  500 mcg Nebulization BID RT  
 albuterol-ipratropium (DUO-NEB) 2.5 MG-0.5 MG/3 ML  3 mL Nebulization Q6H PRN  
 cetirizine (ZYRTEC) tablet 10 mg  10 mg Oral DAILY  nicotine (NICODERM CQ) 21 mg/24 hr patch 1 Patch  1 Patch TransDERmal DAILY  ALPRAZolam (XANAX) tablet 0.5 mg  0.5 mg Oral TID PRN  
 
 
 
 REVIEW OF SYSTEMS Negative except as stated in the HPI. Physical Exam:  
Visit Vitals BP (!) 74/50 Pulse 68 Temp 98 °F (36.7 °C) Resp 24 Ht 5' 7\" (1.702 m) Wt 72.4 kg (159 lb 9.6 oz) SpO2 94% BMI 25.00 kg/m² General:  Alert, cooperative, no distress, appears stated age. Head:  Normocephalic, without obvious abnormality, atraumatic. Eyes:  Conjunctivae/corneas clear. PERRL, EOMs intact. Nose: Nares normal. Septum midline. Mucosa normal. No drainage or sinus tenderness. Throat: Lips, mucosa, and tongue normal. Teeth and gums normal.  
Neck: Supple, symmetrical, trachea midline, no adenopathy, thyroid: no enlargment/tenderness/nodules, no carotid bruit and no JVD. Back:   Symmetric, no curvature. ROM normal.  
Lungs:   Occasional rhonchi. Chest wall:  No tenderness or deformity. Heart:  Regular rate and rhythm, S1, S2 normal, no murmur, click, rub or gallop. Abdomen:   Soft, non-tender. Bowel sounds normal. No masses,  No organomegaly. Extremities: Extremities normal, atraumatic, no cyanosis or edema. Pulses: 2+ and symmetric all extremities. Skin: Skin color, texture, turgor normal. No rashes or lesions Lymph nodes: Cervical, supraclavicular, and axillary nodes normal.  
Neurologic: Grossly nonfocal  
 
 
Lab Results Component Value Date/Time Sodium 142 05/15/2019 04:31 AM  
 Potassium 4.3 05/15/2019 04:31 AM  
 Chloride 112 (H) 05/15/2019 04:31 AM  
 CO2 24 05/15/2019 04:31 AM  
 BUN 21 (H) 05/15/2019 04:31 AM  
 Creatinine 0.94 05/15/2019 04:31 AM  
 Glucose 99 05/15/2019 04:31 AM  
 Calcium 8.2 (L) 05/15/2019 04:31 AM  
 Magnesium 2.3 05/15/2019 04:31 AM  
 
 
Lab Results Component Value Date/Time WBC 13.7 (H) 05/15/2019 04:31 AM  
 HGB 12.1 05/15/2019 04:31 AM  
 PLATELET 966 54/26/6266 04:31 AM  
 MCV 92.9 05/15/2019 04:31 AM  
 
 
Lab Results Component Value Date/Time  INR 1.1 05/14/2019 04:58 PM  
 aPTT 25.8 05/14/2019 04:58 PM  
 AST (SGOT) 27 05/15/2019 04:31 AM  
 Alk. phosphatase 62 05/15/2019 04:31 AM  
 Protein, total 5.5 (L) 05/15/2019 04:31 AM  
 Albumin 3.1 (L) 05/15/2019 04:31 AM  
 Globulin 2.4 05/15/2019 04:31 AM  
 
 
No results found for: IRON, FE, TIBC, IBCT, PSAT, FERR Lab Results Component Value Date/Time TSH 0.72 05/09/2019 02:33 PM  
  
 
Lab Results Component Value Date/Time Color YELLOW/STRAW 05/09/2019 02:24 PM  
 Appearance CLEAR 05/09/2019 02:24 PM  
 Specific gravity 1.010 05/09/2019 02:24 PM  
 pH (UA) 5.0 05/09/2019 02:24 PM  
 Protein NEGATIVE  05/09/2019 02:24 PM  
 Glucose NEGATIVE  05/09/2019 02:24 PM  
 Ketone NEGATIVE  05/09/2019 02:24 PM  
 Bilirubin NEGATIVE  05/09/2019 02:24 PM  
 Blood TRACE (A) 05/09/2019 02:24 PM  
 Urobilinogen 0.2 05/09/2019 02:24 PM  
 Nitrites NEGATIVE  05/09/2019 02:24 PM  
 Leukocyte Esterase NEGATIVE  05/09/2019 02:24 PM  
 WBC 0-4 05/09/2019 02:24 PM  
 RBC 0-5 05/09/2019 02:24 PM  
 Bacteria NEGATIVE  05/09/2019 02:24 PM  
 
 
IMPRESSION: 
=========== 
-s/p 3V CABG 5/14/19 
-ChronicCough - suspect cough related to allergies and post nasal drip. 
-Obstructive airway disease; asthma more likely than copd. -former smoker; recently stopped -GERD. -pre operative pulmonary clearance. PLAN: 
===== 
UGI no reflux Suspect cough combo or cough variant asthma ( on brovana and pulmicort) + allergy related PNDS( on pred and flonase). Still coughing and productive post op but WBC coming down Add nasal azelastine every day ( nasal antihistamine) Pulm toilet No need for abx escalation at this time Tito Matthews MD

## 2019-05-15 NOTE — PROGRESS NOTES
[facility] Progress Note Admit Date: 2019 POD:  1 Day Post-Op Procedure:  Procedure(s): 
CABG x 3 WITH CPB; DONOR SITES: LIMA AND RIGHT SAPHENOUS VEIN VIA EVH; CÉSAR AND EPI AORTIC BY DR. Melecio Bryant. Subjective:  
Pt seen with Dr. Rita Renner. Extubated uneventfully yesterday. On low dose katelynn, amio. Pain controlled. Objective:  
Vitals: 
Blood pressure (!) 81/44, pulse 68, temperature 97.6 °F (36.4 °C), resp. rate 24, height 5' 7\" (1.702 m), weight 159 lb 9.6 oz (72.4 kg), SpO2 99 %. Temp (24hrs), Av.8 °F (36.6 °C), Min:97.4 °F (36.3 °C), Max:98.1 °F (36.7 °C) Hemodynamics: 
 CO: CO (l/min): 4.5 l/min CI: CI (l/min/m2): 2.5 l/min/m2 CVP: CVP (mmHg): 11 mmHg (19 1745) SVR: SVR (dyne*sec)/cm5: 953 (dyne*sec)/cm5 (19 1700) PAP Systolic: PAP Systolic: 32 (99 7507) PAP Diastolic: PAP Diastolic: 9 (47/86/64 8314) PVR:   
 SV02: SVO2 (%): 75 % (19 1730) SCV02:   
 
EKG/Rhythm: A paced, SR under Extubation Date / Time: 19 @ 1930 CT Output: 410 (250 in 12 hrs) Ventilator: 
Ventilator Volumes Vt Set (ml): 500 ml (19) Vt Exhaled (Machine Breath) (ml): 472 ml (19) Ve Observed (l/min): 5.69 l/min (19) Oxygen Therapy: 
Oxygen Therapy O2 Sat (%): 99 % (05/15/19 0845) Pulse via Oximetry: 68 beats per minute (05/15/19 0845) O2 Device: Nasal cannula (05/15/19 0800) O2 Flow Rate (L/min): 4 l/min (05/15/19 0800) FIO2 (%): 50 % (19 1800) CXR: 
CXR Results  (Last 48 hours) 05/15/19 0500  XR CHEST PORT Final result Impression:  IMPRESSION:  
Removal of pulmonary artery catheter. Focal opacity in the left lung improved. Otherwise no significant change. Narrative:  PORTABLE CHEST RADIOGRAPH/S: 5/15/2019 5:00 AM  
   
Clinical history: Postoperative heart INDICATION:   postop heart COMPARISON: 2019 FINDINGS:  
 AP portable upright view of the chest demonstrates stable  cardiopericardial  
silhouette. The lungs are adequately expanded. Left-sided pleural drainage  
catheter with evidence of significant residual pneumothorax. Mediastinal drain. Ethelene Gauss There is no edema, effusion, consolidation, or pneumothorax. The osseous  
structures are unremarkable poststernotomy. Pulmonary artery catheter has been  
removed. Airspace opacity in the left lung). . Patient is on a cardiac monitor. 05/14/19 1652  XR CHEST PORT Final result Impression:  IMPRESSION:  
1. Lines and tubes as detailed above with probable atelectasis left mid chest  
post median sternotomy Narrative:  INDICATION:  postop heart EXAM: Portable chest 1639 hours. Comparison May 10, 2019. FINDINGS: Multiple monitoring leads overlie the chest.   
   
 Endotracheal tube overlies the trachea at the level of the clavicles. . Gastric  
tube overlies the stomach. The proximal sidehole is at the gastroesophageal  
junction. The tube can be advanced approximately 10 cm There is a left-sided chest tube. Right neck pulmonary artery catheter with its  
tip at the region of the main pulmonary artery. Probable mediastinal drain. No pneumothorax. Lung volumes are decreased. There is patchy opacity in the  
left mid chest likely representing atelectasis Admission Weight: Last Weight Weight: 161 lb (73 kg) Weight: 159 lb 9.6 oz (72.4 kg) Intake / Gregpedro Fellers / Drain: 
Current Shift: 05/15 0701 - 05/15 1900 In: -  
Out: 20 [Drains:20] Last 24 hrs.:  
 
Intake/Output Summary (Last 24 hours) at 5/15/2019 3422 Last data filed at 5/15/2019 0800 Gross per 24 hour Intake 2896.29 ml Output 2440 ml Net 456.29 ml EXAM: 
General:   NAD Lungs:   Clear to auscultation bilaterally. Incision:  No erythema, drainage or swelling. Heart:  Regular rate and rhythm, S1, S2 normal, no murmur, click, rub or gallop. Abdomen:   Soft, non-tender. Bowel sounds normal. No masses,  No organomegaly. Extremities:  No edema. PPP. Neurologic:  Gross motor and sensory apparatus intact. Labs:  
Recent Labs 05/15/19 
4798  05/15/19 
0431  19 
1658 WBC  --   --  13.7*  --  29.7* HGB  --   --  12.1   < > 13.0 HCT  --   --  36.8   < > 39.4 PLT  --   --  213  --  257 NA  --   --  142   < > 143 K  --   --  4.3   < > 3.8 BUN  --   --  21*   < > 21* CREA  --   --  0.94   < > 0.98  
GLU  --   --  99   < > 141* GLUCPOC 93   < >  --    < >  --   
INR  --   --   --   --  1.1  
 < > = values in this interval not displayed. Assessment:  
 
Active Problems: 
  CAD (coronary artery disease) (2019) S/P CABG x 3 (2019) Overview: CABG X 3 LIMA to LAD, RSVG to RAMUS, RSVG to RCA 
    RIGHT ENDOSCOPIC [address] Plan/Recommendations/Medical Decision Makin.  CAD s/p CABG, EF 65%: ASA/Statin, hold BB due to hypotension. Give additional albumin & 0.5g CaCl, wean katelynn. 2. Dysphagia/Weight loss/new onset GERD/? Pancreatic mass: MRI negative for pancreatic mass. UGI Series shows small hiatal hernia & no other acute findings. GI recommends outpatient follow up & has signed off. 
    
3. Hx of R carotid endarectomy: stable 4. Hx of PVD,(L SFA BAP 2007, R SFA atherectomy 3/2018): stable 5. COPD:  PRN duo-nebs, steroid nebs. Zrytec, Flonase, Singulair. Pulmonary following, input appreciated. Chest CT negative, Prednisone 40 PO daily per pulmonary. 6. HLD:  Cont statin.   
 
7. BPH:  cont flomax/proscar. D/c lucas. 8. SR w/ 1st deg block:  sinus atiya post op. Holding BB due to hypotension. 9. Anticipated acute post op resp insufficiency: wean NC, cough, deep breathe, ambulate. Hold diuresis. 10. Leukocytosis: mild, likely related to post op state Dispo: wean katelynn, potential transfer later today.  
 
Signed By: WAGNER Chinchilla

## 2019-05-15 NOTE — PROGRESS NOTES
1945: Bedside and Verbal shift change report given to Harley Lo RN (oncoming nurse) by Aranza Lehman RN (offgoing nurse). Report included the following information SBAR, OR Summary, Procedure Summary, Intake/Output, MAR, Recent Results, Cardiac Rhythm SR-SB and Alarm Parameters . 2100: Pt passes STAND 
 
2143: Pt , ryan gtt turned off 
 
2209: Pt unable to maintain MAP >65, Ryan gtt restarted at 10mcg 
 
2321: Pt HR in 50s with pacer back up rate of 50, Increased rate on pacemaker, Pt AAI pacing at 70, Ryan gtt turned off. Pt maintaining MAP >65 
 
2335: 1 Albumin given for volume expansion due to MAP < 65 
 
0555: Pt unable to maintain MAP >65 despite 2nd albumin given on shift, Ryan gtt restarted at 10mcg 
 
0645: Jaeger catheter removed 0745: Bedside and Verbal shift change report given to Ana Everett RN (oncoming nurse) by Harley Lo RN (offgoing nurse). Report included the following information SBAR, OR Summary, Procedure Summary, Intake/Output, Recent Results, Cardiac Rhythm Paced and Alarm Parameters .

## 2019-05-15 NOTE — PROGRESS NOTES
Problem: Non-Violent Restraints Goal: *Removal from restraints as soon as assessed to be safe Outcome: Resolved/Met Goal: *No harm/injury to patient while restraints in use Outcome: Resolved/Met Goal: *Patient's dignity will be maintained Outcome: Resolved/Met Goal: *Patient Specific Goal (EDIT GOAL, INSERT TEXT) Outcome: Resolved/Met Goal: Non-violent Restaints:Standard Interventions Outcome: Resolved/Met Goal: Non-violent Restraints:Patient Interventions Outcome: Resolved/Met Goal: Patient/Family Education Outcome: Resolved/Met Problem: Ventilator Management Goal: *Adequate oxygenation and ventilation Outcome: Resolved/Met Goal: *Patient maintains clear airway/free of aspiration Outcome: Resolved/Met Goal: *Absence of infection signs and symptoms Outcome: Resolved/Met Goal: *Normal spontaneous ventilation Outcome: Resolved/Met Problem: Patient Education: Go to Patient Education Activity Goal: Patient/Family Education Outcome: Resolved/Met Problem: CABG: Pre-Op Day Goal: Activity/Safety Outcome: Resolved/Met Goal: Consults, if ordered Outcome: Resolved/Met Goal: Diagnostic Test/Procedures Outcome: Resolved/Met Goal: Nutrition/Diet Outcome: Resolved/Met Goal: Medications Outcome: Resolved/Met Goal: Treatments/Interventions/Procedures Outcome: Resolved/Met Goal: Discharge Planning Outcome: Resolved/Met Goal: Psychosocial 
Outcome: Resolved/Met Goal: *Hemodynamically stable Outcome: Resolved/Met Goal: *Consent obtained, permits and diagnostics complete Outcome: Resolved/Met Problem: CABG: Day of Surgery (Initiate SCIP measures for post-op care) Goal: Activity/Safety Outcome: Resolved/Met Goal: Consults, if ordered Outcome: Resolved/Met Goal: Diagnostic Test/Procedures Outcome: Resolved/Met Goal: Nutrition/Diet Outcome: Resolved/Met Goal: Medications Outcome: Resolved/Met Goal: Respiratory Outcome: Resolved/Met Goal: Treatments/Interventions/Procedures Outcome: Resolved/Met Goal: Psychosocial 
Outcome: Resolved/Met Goal: *Hemodynamically stable (eg: Cardiac output/index; pulmonary arterial pressures; mixed venous oxygen saturation within set parameters) Outcome: Resolved/Met Goal: *Lungs clear or at baseline Outcome: Resolved/Met Goal: *Stable cardiac rhythm Outcome: Resolved/Met Goal: *Afebrile Outcome: Resolved/Met Goal: *Follows simple commands post anesthesia Outcome: Resolved/Met Goal: *Orients easily following extubation Outcome: Resolved/Met Goal: *Optimal pain control at patient's stated goal 
Outcome: Resolved/Met

## 2019-05-15 NOTE — PROGRESS NOTES
Problem: Self Care Deficits Care Plan (Adult) Goal: *Acute Goals and Plan of Care (Insert Text) Description Occupational Therapy Goals Initiated 5/15/2019 1. Patient will perform ADLs standing 5 mins without fatigue or LOB with supervision/set-up within 7 day(s). 2.  Patient will perform lower body ADLs with supervision/set-up within 7 day(s). 3.  Patient will perform gathering ADL items high and low 2/2 with supervision/set-up within 7 day(s). 4.  Patient will perform toilet transfers with supervision/set-up within 7 day(s). 5.  Patient will perform all aspects of toileting with supervision/set-up within 7 day(s). 6.  Patient will participate in cardiac/sternal upper extremity therapeutic exercise/activities to increase independence with ADLs with supervision/set-up for 5 minutes within 7 day(s). 5/15/2019 1115 by Eloise Cheung OT Outcome: Progressing Towards Goal 
 
OCCUPATIONAL THERAPY EVALUATION Patient: Pamela Cummings (89 y.o. male) Date: 5/15/2019 Primary Diagnosis: Nonspecific abnormal function study, cardiovascular [R94.30] CAD (coronary artery disease) [I25.10] CAD (coronary artery disease) [I25.10] Procedure(s) (LRB): 
CABG x 3 WITH CPB; DONOR SITES: LIMA AND RIGHT SAPHENOUS VEIN VIA EVH; CÉSAR AND EPI AORTIC BY DR. Rosalva Tellez. (N/A) 1 Day Post-Op Precautions: Fall, Sternal 
 
ASSESSMENT : 
Based on the objective data described below, the patient presents with setup-SBA for upper body ADLs, SPV-Min A for lower body ADLs, and Min A for functional mobility s/p CABG x3 POD #1. PTA, patient IND-Mod I, decreasing activity tolerance with coughing & SOB but able to complete ADLs and functional mobility with no AD/DME. Now presents with newly enforced sternal precautions with good-fair carryover, post-op pain, decreased activity tolerance, impaired standing balance, generalized weakness, and hypotension (BP 80s/40-50s, MAPs 55-68). Received in the chair, agreeable to therapy, unaware of sternal precautions, all reviewed & reinforced. Numerous coughing throughout session, min cues for splinting chest. Educated on lower body ADL modifications, able to tailor sit with SPV. Sit<>stand & sidestepping to the bed requiring Min A & second A for lines, slight posterior lean with mobility. Anticipate good recovery with continued therapy, recommend HHOT vs transition to OP cardiac pending progress. Recommend with nursing patient to complete as able in order to maintain strength, endurance and independence: ADLs with supervision/setup, OOB to chair 3x/day and mobilizing to the bathroom for toileting with 1-2 assist (second A for lines). Thank you for your assistance. Patient will benefit from skilled intervention to address the above impairments. Patient?s rehabilitation potential is considered to be Good Factors which may influence rehabilitation potential include: ? None noted ? Mental ability/status ? Medical condition ? Home/family situation and support systems ? Safety awareness ? Pain tolerance/management ? Other: PLAN : 
Recommendations and Planned Interventions: 
?               Self Care Training                  ? Therapeutic Activities ? Functional Mobility Training    ? Cognitive Retraining 
? Therapeutic Exercises           ? Endurance Activities ? Balance Training                   ? Neuromuscular Re-Education ? Visual/Perceptual Training     ? Home Safety Training 
? Patient Education                 ? Family Training/Education ? Other (comment): Frequency/Duration: Patient will be followed by occupational therapy 5 times a week to address goals. Discharge Recommendations: Home Health vs Outpatient Cardiac Rehab Further Equipment Recommendations for Discharge: TBD SUBJECTIVE:  
Patient stated ? 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 I'll sign any autographs if you want one.? The patient stated 0/3 sternal precautions. Reviewed all 3 with patient. OBJECTIVE DATA SUMMARY:  
HISTORY:  
Past Medical History:  
Diagnosis Date Arthritis Asthma INHALER USED DAILY Cancer ([address] SKIN Other ill-defined conditions(799.89) HIGH CHOLESTEROL Other ill-defined conditions(799.89) PVD Other ill-defined conditions(799.89) OCCAS INSOMNIA Past Surgical History:  
Procedure Laterality Date VASCULAR SURGERY PROCEDURE UNLIST    
 LEFT LEG, SFA ANGIOPLASTY Prior Level of Function/Environment/Context: IND-Mod I, steadily declining activity tolerance with coughing. Lives with spouse (who can stay to assist with care at home) in a 2 story house, master bedroom on 1st floor, office on 2nd. Enjoys golfing, gardening, & fishing Home Situation Home Environment: Private residence # Steps to Enter: 3 Rails to Enter: Yes One/Two Story Residence: Two story, live on 1st floor Living Alone: No 
Support Systems: Spouse/Significant Other/Partner, Child(king) Patient Expects to be Discharged to[de-identified] Private residence Current DME Used/Available at Home: None Tub or Shower Type: Shower Hand dominance: Right EXAMINATION OF PERFORMANCE DEFICITS: 
Cognitive/Behavioral Status: 
Neurologic State: Alert Orientation Level: Oriented X4 Cognition: Appropriate for age attention/concentration; Appropriate decision making; Follows commands Perception: Appears intact Perseveration: No perseveration noted Safety/Judgement: Awareness of environment; Fall prevention Skin: Appears intact Edema: None noted Hearing: Auditory Auditory Impairment: Hard of hearing, bilateral, Hearing aid(s) Hearing Aids/Status: At home Vision/Perceptual:   
Tracking: Able to track stimulus in all quadrants w/o difficulty Acuity: Within Defined Limits Range of Motion: BUE 
AROM: Generally decreased, functional 
  
  
  
  
  
  
  
Strength: BUE Strength: Generally decreased, functional 
  
  
  
  
Coordination: 
Coordination: Within functional limits Fine Motor Skills-Upper: Left Intact; Right Intact Gross Motor Skills-Upper: Left Intact; Right Intact Tone & Sensation: BUE Tone: Normal 
  
  
  
  
  
  
  
Balance: 
Sitting: Intact Standing: Impaired Standing - Static: Good Standing - Dynamic : Fair Functional Mobility and Transfers for ADLs: 
Bed Mobility: 
Supine to Sit: (received in chair) Sit to Supine: Bed Modified Scooting: Maximum assistance;Assist x2(supine) Transfers: 
Sit to Stand: Minimum assistance(second A for lines only) Stand to Sit: Minimum assistance(for controlled descent) Bed to Chair: Minimum assistance(second A for lines only; slight posterior lean) Toilet Transfer : Minimum assistance(infer to [facility] per functional mobility) ADL Assessment: 
Feeding: Setup Oral Facial Hygiene/Grooming: Setup(infer seated 2* activity tolerance, hypotension, and balance) Bathing: Minimum assistance(infer for standing balance with pericare) Upper Body Dressing: Stand-by assistance(infer per BUE ROM, post-op pain, & strength) Lower Body Dressing: Minimum assistance(infer for reach & standing balance) Toileting: Minimum assistance(infer for standing balance with hygiene) ADL Intervention and task modifications: 
  
 
  
 
  
 
Lower Body Bathing Lower Body : Compensatory technique training Cues: Verbal cues provided;Visual cues provided Lower Body Dressing Assistance Dressing Assistance: Supervision(seated) Underpants: Compensatory technique training Pants With Elastic Waist: Compensatory technique training Pants With Button/Zipper: Compensatory technique training Socks: Supervision; Compensatory technique training Leg Crossed Method Used: Yes Position Performed: Seated in chair Cues: Verbal cues provided;Visual cues provided Cognitive Retraining Safety/Judgement: Awareness of environment; Fall prevention Patient instructed no asymmetrical reaching over head to ensure B UEs when shoulders >90* i.e. reaching in cabinets and dressing. Instruction on upper body dressing techniques of over head, then arms through to decrease pain and unilateral shoulder flexion >90*. Instruction on the benefits of utilizing B UEs during functional tasks i.e. opening the fridge, stepping into the tub. Instruction if continued pain at home with shoulder IR for BM hygiene can use wet wipes and toilet tongs PRN. Avoid valsalva maneuvers. May have to adjust home setup to increase ease with items closer to waist height to prevent deep bending and the automatic  of asymmetrical UE WB/pushing for stabilization during bending. Benefit to don clothing tailor sitting and don all clothing while sitting prior to standing. Patient demonstrated lower body dressing seated in chair with Supervision, Min A for standing balance. Instruction and indicated understanding on the benefits of loose clothing throughout to accommodate for post surgical swelling, decreased ROM and increased pain. Instruction and indicated understanding the technique of pull over shirt versus front open clothing. Increase activity tolerance for home, work, and sexual intercourse by pacing self with increasing the arm exercises, sitting duration, frequency OOB, walking, standing, and ADLs. Instructed and indicated understanding of s/s of too much activity, how to respond to s/s safely. Therapeutic Exercises:  
Patient instructed on the benefits and demonstrated cardiac exercises while seated in chair with Supervision.  Instructed and indicated understanding on how to progress reps, sets against gravity, working up to 5 lbs, standing and so on based on surgeon clearance for more weight in prep for basic and instrumental ADLs. Instruction on the use of household items in place of weights as needed. CARDIAC EXERCISE Sets Reps Active  Active Assist   
Passive  Self ROM Comments Shoulder flexion  1  5   ?                            ?                             ?                             ?                               
Scapular retraction  1  5  ?                             ?                             ?                             ?                               
Scapular elevation  1  5  ?                             ?                              ?                             ?                               
 
Functional Measure: 
Barthel Index: 
Bathin Bladder: 10 Bowels: 5 Groomin Dressin Feeding: 10 Mobility: 0 Stairs: 0 Toilet Use: 5 Transfer (Bed to Chair and Back): 10 Total: 50/100 Percentage of impairment  
0% 1-19% 20-39% 40-59% 60-79% 80-99% 100% Barthel Score 0-100 100 99-80 79-60 59-40 20-39 1-19 
 0 The Barthel ADL Index: Guidelines 1. The index should be used as a record of what a patient does, not as a record of what a patient could do. 2. The main aim is to establish degree of independence from any help, physical or verbal, however minor and for whatever reason. 3. The need for supervision renders the patient not independent. 4. A patient's performance should be established using the best available evidence. Asking the patient, friends/relatives and nurses are the usual sources, but direct observation and common sense are also important. However direct testing is not needed. 5. Usually the patient's performance over the preceding 24-48 hours is important, but occasionally longer periods will be relevant. 6. Middle categories imply that the patient supplies over 50 per cent of the effort. 7. Use of aids to be independent is allowed. Asa nAdrews., Barthel, DSamuelW. (2211). Functional evaluation: the Barthel Index. [address] (14)2. CHANTELL Cho, Kwame Bobby., Radhakorey Hernandez., Jewel, [address] (1999). Measuring the change indisability after inpatient rehabilitation; comparison of the responsiveness of the Barthel Index and Functional Ashley Measure. Journal of Neurology, Neurosurgery, and Psychiatry, 66(4), 002-831. REJI Austin, YUNG Mckeon, & Beronica Blandon M.A. (2004.) Assessment of post-stroke quality of life in cost-effectiveness studies: The usefulness of the Barthel Index and the EuroQoL-5D. [facility], 13, 164-18 Occupational Therapy Evaluation Charge Determination History Examination Decision-Making LOW Complexity : Brief history review  MEDIUM Complexity : 3-5 performance deficits relating to physical, cognitive , or psychosocial skils that result in activity limitations and / or participation restrictions LOW Complexity : No comorbidities that affect functional and no verbal or physical assistance needed to complete eval tasks Based on the above components, the patient evaluation is determined to be of the following complexity level: LOW Pain: 
Pain Scale 1: Numeric (0 - 10) Pain Intensity 1: 1 Pain Location 1: Chest 
Pain Orientation 1: Anterior;Mid 
Pain Description 1: Aching Pain Intervention(s) 1: Other (comment)(pt refused anything else at this time) Activity Tolerance:  
Good despite hypotension Please refer to the flowsheet for vital signs taken during this treatment. After treatment:  
? Patient left in no apparent distress sitting up in chair ? Patient left in no apparent distress in bed 
? Call bell left within reach ? Nursing notified ? Caregiver present ? Bed alarm activated COMMUNICATION/EDUCATION:  
 The patient?s plan of care was discussed with: Physical Therapist and Registered Nurse. 
? Home safety education was provided and the patient/caregiver indicated understanding. ? Patient/family have participated as able in goal setting and plan of care. ? Patient/family agree to work toward stated goals and plan of care. ? Patient understands intent and goals of therapy, but is neutral about his/her participation. ? Patient is unable to participate in goal setting and plan of care. This patient?s plan of care is appropriate for delegation to [facility]. Thank you for this referral. 
KAREN Smith, OTR/L Time Calculation: 32 mins

## 2019-05-15 NOTE — PROGRESS NOTES
Problem: Falls - Risk of 
Goal: *Absence of Falls Description Document Sergio Zeng Fall Risk and appropriate interventions in the flowsheet. Outcome: Progressing Towards Goal 
  
Problem: Patient Education: Go to Patient Education Activity Goal: Patient/Family Education Outcome: Progressing Towards Goal 
  
Problem: Pressure Injury - Risk of 
Goal: *Prevention of pressure injury Description Document Gian Scale and appropriate interventions in the flowsheet. Outcome: Progressing Towards Goal 
  
Problem: Patient Education: Go to Patient Education Activity Goal: Patient/Family Education Outcome: Progressing Towards Goal 
  
Problem: CABG: Post-Op Day 1 Goal: Activity/Safety Outcome: Progressing Towards Goal 
Goal: Consults, if ordered Outcome: Progressing Towards Goal 
Goal: Diagnostic Test/Procedures Outcome: Progressing Towards Goal 
Goal: Nutrition/Diet Outcome: Progressing Towards Goal 
Goal: Medications Outcome: Progressing Towards Goal 
Goal: Respiratory Outcome: Progressing Towards Goal 
Goal: Treatments/Interventions/Procedures Outcome: Progressing Towards Goal 
Goal: Psychosocial 
Outcome: Progressing Towards Goal 
Goal: *Hemodynamically stable without vasoactive medications Outcome: Progressing Towards Goal 
Goal: *Lungs clear or at baseline Outcome: Progressing Towards Goal 
Goal: *Mechanical ventilation discontinued Outcome: Progressing Towards Goal 
Goal: *Optimal pain control at patient's stated goal 
Outcome: Progressing Towards Goal 
Goal: *Demonstrates progressive activity Outcome: Progressing Towards Goal 
Goal: *Tolerating diet Outcome: Progressing Towards Goal 
Goal: *Level of consciousness returns to baseline Outcome: Progressing Towards Goal

## 2019-05-15 NOTE — PROGRESS NOTES
Problem: Mobility Impaired (Adult and Pediatric) Goal: *Acute Goals and Plan of Care (Insert Text) Description Physical Therapy Goals Initiated 5/15/2019 1. Patient will move from supine to sit and sit to supine, scoot up and down and roll side to side in bed with modified independence within 5 days. 2.  Patient will perform sit to/from stand with supervision/set-up within 5 days. 3.  Patient will ambulate 150 feet with least restrictive assistive device and supervision/set-up within 5 days. 4.  Patient will ascend/descend 6 stairs with handrail(s), per home set-up, with supervision/set-up within 5 days. 5.  Patient will perform cardiac exercises per protocol with independence within 5 days. 6.  Patient will verbally and functionally recall 3/3 sternal precautions within 5 days. Outcome: Progressing Towards Goal 
 
PHYSICAL THERAPY EVALUATION Patient: Alfred Arenas (79 y.o. male) Date: 5/15/2019 Primary Diagnosis: Nonspecific abnormal function study, cardiovascular [R94.30] CAD (coronary artery disease) [I25.10] CAD (coronary artery disease) [I25.10] Procedure(s) (LRB): 
CABG x 3 WITH CPB; DONOR SITES: LIMA AND RIGHT SAPHENOUS VEIN VIA EVH; CÉSAR AND EPI AORTIC BY DR. Deanna Gitelman. (N/A) 1 Day Post-Op Precautions:  Fall, Sternal 
 
ASSESSMENT : 
Based on the objective data described below, the patient presents with impaired functional independence compared to baseline secondary to CABG x 3, now POD # 1. Currently, patient's functional mobility is limited by the following: newly enforced sternal precautions, typical post-op sternotomy pain (6/10) + productive cough, impaired cardiopulmonary tolerance. Anxiousness, generally decreased functional strength, impaired endurance, and generally impaired dynamic standing balance. Prior to mobility training, review of sternal precautions took place. Prior, patient only able to recall 2/3 precautions.  Extensively reviewed use of bear for sternal support while coughing/sneezing, continued use of incentive spirometer 10 x per hour, role of acute PT and typical mobility progression, cardiac exercises and frequency of performance, signs/symptoms of DVTs/pneumonia/sternal wound dehiscence, and importance of frequent mobilization outside of therapy sessions with nursing staff. Received patient in modified chair position in bed. Utilized anterior rocking x 3 for momentum to stand. Minimal assist was also provided for buttocks clearance and initial steadying upon standing (significant posterior trunk lean requiring assist to correct AND maintain). In standing, patient performed x 10 standing marches and completed side-stepping to bedside chair with moderate assist x 1 (approximately 5 ft). Gait josh very slow and cautious. Gait pattern shuffled, with increased trunk sway, and discontinuous steps. Patient seated in bedside chair at conclusion of session. All needs met and within reach. Provided instruction to RN on techniques to utilize in order to safely transfer patient out of bed/back to bed. Prior to admission, patient reports functional independence - golfing, gardening. Pending medical stability, pain management, and progress with functional mobility, recommend discharge home with PT services. Patient will benefit from skilled intervention to address the above impairments. Patient?s rehabilitation potential is considered to be Fair Factors which may influence rehabilitation potential include:  
? None noted ? Mental ability/status ? Medical condition ? Home/family situation and support systems ? Safety awareness 
? Pain tolerance/management 
? Other: PLAN : 
Recommendations and Planned Interventions: 
?           Bed Mobility Training             ? Neuromuscular Re-Education ? Transfer Training                   ?     Orthotic/Prosthetic Training 
? Gait Training                         ? Modalities ? Therapeutic Exercises           ? Edema Management/Control ? Therapeutic Activities            ? Patient and Family Training/Education ? Other (comment): Frequency/Duration: Patient will be followed by physical therapy  daily to address goals. Discharge Recommendations: Home Health Further Equipment Recommendations for Discharge: TBD SUBJECTIVE:  
Patient stated ? So are these the 6 exercises. ? OBJECTIVE DATA SUMMARY:  
HISTORY:   
Past Medical History:  
Diagnosis Date Arthritis Asthma INHALER USED DAILY Cancer ([address] SKIN Other ill-defined conditions(799.89) HIGH CHOLESTEROL Other ill-defined conditions(799.89) PVD Other ill-defined conditions(799.89) OCCAS INSOMNIA Past Surgical History:  
Procedure Laterality Date VASCULAR SURGERY PROCEDURE UNLIST    
 LEFT LEG, SFA ANGIOPLASTY Personal factors and/or comorbidities impacting plan of care: Current smoker. Asthma Home Situation Home Environment: Private residence # Steps to Enter: 3 Rails to Enter: Yes One/Two Story Residence: Two story, live on 1st floor Living Alone: No 
Support Systems: Spouse/Significant Other/Partner, Child(king) Patient Expects to be Discharged to[de-identified] Private residence Current DME Used/Available at Home: None Tub or Shower Type: Shower EXAMINATION/PRESENTATION/DECISION MAKING:  
Critical Behavior: 
Neurologic State: Alert Orientation Level: Oriented X4 Cognition: Appropriate for age attention/concentration, Appropriate decision making, Follows commands Safety/Judgement: Awareness of environment, Fall prevention Hearing: Auditory Auditory Impairment: Hard of hearing, bilateral, Hearing aid(s) Hearing Aids/Status: At home Range Of Motion: 
AROM: Generally decreased, functional 
  
  
  
  
  
  
  
Strength:   
 Strength: Generally decreased, functional 
  
  
  
  
  
  
Tone & Sensation:  
Tone: Normal 
  
  
  
  
  
  
  
  
   
Coordination: 
Coordination: Generally decreased, functional 
Vision:  
Tracking: Able to track stimulus in all quadrants w/o difficulty Acuity: Within Defined Limits Functional Mobility: 
Bed Mobility: 
  
Supine to Sit: (received in chair) Sit to Supine: Bed Modified Scooting: Maximum assistance;Assist x2(supine) Transfers: 
Sit to Stand: Minimum assistance(second A for lines only) Stand to Sit: Minimum assistance(for controlled descent) Bed to Chair: Minimum assistance(second A for lines only; slight posterior lean) Balance:  
Sitting: Intact Standing: Impaired; Without support Standing - Static: Fair(Posterior trunk lean; forward flexed neck/head) Standing - Dynamic : Fair Therapeutic Exercises:  
6/6 UE cardiac exercises, x 4 reps each Functional Measure: 
Timed up and go: 
 
Timed Get Up And Go Test: (Unable to complete this date) < than 10 seconds=Normal  Greater then 13.5 seconds (in elderly)=Increased fall risk Anya EDWARDS Predicting the probability for falls in community dwelling older adults using the Timed Up and Go Test. Phys Ther. 2000;80:896-903. Based on the above components, the patient evaluation is determined to be of the following complexity level: LOW Pain: 
Pain Scale 1: Numeric (0 - 10) Pain Intensity 1: 0 Pain Location 1: Chest 
Pain Orientation 1: Anterior;Mid 
Pain Description 1: Aching Pain Intervention(s) 1: Other (comment)(pt refused anything else at this time) Activity Tolerance: 
Please refer to the flowsheet for vital signs taken during this treatment. After treatment:  
?         Patient left in no apparent distress sitting up in chair ? Patient left in no apparent distress in bed 
? Call bell left within reach ? Nursing notified ?         Caregiver present ? Bed alarm activated COMMUNICATION/EDUCATION:  
The patient?s plan of care was discussed with: Occupational Therapist and Registered Nurse. ?         Fall prevention education was provided and the patient/caregiver indicated understanding. ? Patient/family have participated as able in goal setting and plan of care. ?         Patient/family agree to work toward stated goals and plan of care. ?         Patient understands intent and goals of therapy, but is neutral about his/her participation. ? Patient is unable to participate in goal setting and plan of care. Thank you for this referral. 
Henrik Solis, PT, DPT Time Calculation: 30 mins

## 2019-05-15 NOTE — PROGRESS NOTES
The patient is post-op day one from CABG- PT/OT evaluations pending. The patient has been accepted by [facility] for home health services pending patient progress. CM will continue to follow for transitions of care.   MICHELLE Guerrero

## 2019-05-15 NOTE — PROGRESS NOTES
Cardiac Surgery Care Coordinator-  Met with Ketan Gaspar. Reviewed plan of care and discussed goals for the day. Ketan Gaspar has a good understanding of his plan for the day. Reinforced sternal precautions and encouraged continued use of the incentive spirometer. Discussed possible discharge date and encouraged Ketan Gaspar to verbalize. Will continue to follow for educational and emotional needs.  Ashley Hinds RN

## 2019-05-15 NOTE — DIABETES MGMT
DTC Cardiac Surgery Progress Note Recommendations/ Comments:  Pt arrived to CVICU at [id number] on 5/14/19. Consider continuing insulin gtt for at least 48hrs post-op and eating 50% solid foods then, 
1) transition off gtt per [employer] Protocol 2) continue accu-checks and humalog correctional insulin ac & hs until pt has 3 consecutive BG <150mg/dl off gtt then discontinue Insulin gtt should not be stopped until after 1635 on 5/16/19 to complete 48hr post-op time frame. Currently on insulin gtt running @ 0.8 units/hr. Patient has received 1.1 units in the last 6 hours and last blood glucose = 140 mg/dL @1059. Chart reviewed on Ashley Obrien. Patient is 75 y.o. male s/p Cardiac surgery  POD 1. No noted history of diabetes. A1c:  
Lab Results Component Value Date/Time Hemoglobin A1c 5.6 05/09/2019 02:33 PM  
 
 
 
Recent Glucose Results:  
Lab Results Component Value Date/Time GLU 99 05/15/2019 04:31 AM  
 GLU 72 05/14/2019 08:46 PM  
  (H) 05/14/2019 04:58 PM  
 GLUCPOC 140 (H) 05/15/2019 10:59 AM  
 GLUCPOC 120 (H) 05/15/2019 09:54 AM  
 GLUCPOC 93 05/15/2019 08:41 AM  
  
 
Lab Results Component Value Date/Time Creatinine 0.94 05/15/2019 04:31 AM  
 
Estimated Creatinine Clearance: 63.5 mL/min (based on SCr of 0.94 mg/dL). Active Orders Diet DIET FULL LIQUID No Conc. Sweets PO intake:  
Patient Vitals for the past 72 hrs: 
 % Diet Eaten 05/14/19 0801 0 % Will continue to follow as needed. Thank you. Usama Nicholson, MS, RN, CDE Time spent: 10 minutes

## 2019-05-16 ENCOUNTER — APPOINTMENT (OUTPATIENT)
Dept: GENERAL RADIOLOGY | Age: 74
DRG: 234 | End: 2019-05-16
Attending: PHYSICIAN ASSISTANT
Payer: MEDICARE

## 2019-05-16 LAB
ADMINISTERED INITIALS, ADMINIT: NORMAL
ALBUMIN SERPL-MCNC: 3.6 G/DL (ref 3.5–5)
ALBUMIN/GLOB SERPL: 1.3 {RATIO} (ref 1.1–2.2)
ALP SERPL-CCNC: 62 U/L (ref 45–117)
ALT SERPL-CCNC: 14 U/L (ref 12–78)
ANION GAP SERPL CALC-SCNC: 6 MMOL/L (ref 5–15)
AST SERPL-CCNC: 24 U/L (ref 15–37)
BILIRUB SERPL-MCNC: 0.5 MG/DL (ref 0.2–1)
BUN SERPL-MCNC: 17 MG/DL (ref 6–20)
BUN/CREAT SERPL: 18 (ref 12–20)
CALCIUM SERPL-MCNC: 8.7 MG/DL (ref 8.5–10.1)
CHLORIDE SERPL-SCNC: 110 MMOL/L (ref 97–108)
CO2 SERPL-SCNC: 23 MMOL/L (ref 21–32)
CREAT SERPL-MCNC: 0.96 MG/DL (ref 0.7–1.3)
D50 ADMINISTERED, D50ADM: 0 ML
D50 ORDER, D50ORD: 0 ML
ERYTHROCYTE [DISTWIDTH] IN BLOOD BY AUTOMATED COUNT: 12.8 % (ref 11.5–14.5)
GLOBULIN SER CALC-MCNC: 2.7 G/DL (ref 2–4)
GLSCOM COMMENTS: NORMAL
GLUCOSE BLD STRIP.AUTO-MCNC: 101 MG/DL (ref 65–100)
GLUCOSE BLD STRIP.AUTO-MCNC: 103 MG/DL (ref 65–100)
GLUCOSE BLD STRIP.AUTO-MCNC: 105 MG/DL (ref 65–100)
GLUCOSE BLD STRIP.AUTO-MCNC: 116 MG/DL (ref 65–100)
GLUCOSE BLD STRIP.AUTO-MCNC: 117 MG/DL (ref 65–100)
GLUCOSE BLD STRIP.AUTO-MCNC: 133 MG/DL (ref 65–100)
GLUCOSE BLD STRIP.AUTO-MCNC: 142 MG/DL (ref 65–100)
GLUCOSE BLD STRIP.AUTO-MCNC: 147 MG/DL (ref 65–100)
GLUCOSE BLD STRIP.AUTO-MCNC: 186 MG/DL (ref 65–100)
GLUCOSE BLD STRIP.AUTO-MCNC: 82 MG/DL (ref 65–100)
GLUCOSE BLD STRIP.AUTO-MCNC: 83 MG/DL (ref 65–100)
GLUCOSE BLD STRIP.AUTO-MCNC: 89 MG/DL (ref 65–100)
GLUCOSE SERPL-MCNC: 101 MG/DL (ref 65–100)
GLUCOSE, GLC: 101 MG/DL
GLUCOSE, GLC: 103 MG/DL
GLUCOSE, GLC: 105 MG/DL
GLUCOSE, GLC: 116 MG/DL
GLUCOSE, GLC: 117 MG/DL
GLUCOSE, GLC: 133 MG/DL
GLUCOSE, GLC: 142 MG/DL
GLUCOSE, GLC: 82 MG/DL
GLUCOSE, GLC: 83 MG/DL
GLUCOSE, GLC: 89 MG/DL
HCT VFR BLD AUTO: 34.9 % (ref 36.6–50.3)
HGB BLD-MCNC: 11.6 G/DL (ref 12.1–17)
HIGH TARGET, HITG: 130 MG/DL
INSULIN ADMINSTERED, INSADM: 0.6 UNITS/HOUR
INSULIN ADMINSTERED, INSADM: 0.7 UNITS/HOUR
INSULIN ADMINSTERED, INSADM: 0.8 UNITS/HOUR
INSULIN ADMINSTERED, INSADM: 0.9 UNITS/HOUR
INSULIN ADMINSTERED, INSADM: 2.2 UNITS/HOUR
INSULIN ADMINSTERED, INSADM: 2.3 UNITS/HOUR
INSULIN ADMINSTERED, INSADM: 2.3 UNITS/HOUR
INSULIN ADMINSTERED, INSADM: 2.5 UNITS/HOUR
INSULIN ADMINSTERED, INSADM: 2.8 UNITS/HOUR
INSULIN ADMINSTERED, INSADM: 2.9 UNITS/HOUR
INSULIN ORDER, INSORD: 0.6 UNITS/HOUR
INSULIN ORDER, INSORD: 0.7 UNITS/HOUR
INSULIN ORDER, INSORD: 0.8 UNITS/HOUR
INSULIN ORDER, INSORD: 0.9 UNITS/HOUR
INSULIN ORDER, INSORD: 2.2 UNITS/HOUR
INSULIN ORDER, INSORD: 2.3 UNITS/HOUR
INSULIN ORDER, INSORD: 2.3 UNITS/HOUR
INSULIN ORDER, INSORD: 2.5 UNITS/HOUR
INSULIN ORDER, INSORD: 2.8 UNITS/HOUR
INSULIN ORDER, INSORD: 2.9 UNITS/HOUR
LOW TARGET, LOT: 95 MG/DL
MAGNESIUM SERPL-MCNC: 2.4 MG/DL (ref 1.6–2.4)
MCH RBC QN AUTO: 31.1 PG (ref 26–34)
MCHC RBC AUTO-ENTMCNC: 33.2 G/DL (ref 30–36.5)
MCV RBC AUTO: 93.6 FL (ref 80–99)
MINUTES UNTIL NEXT BG, NBG: 120 MIN
MINUTES UNTIL NEXT BG, NBG: 120 MIN
MINUTES UNTIL NEXT BG, NBG: 60 MIN
MULTIPLIER, MUL: 0.02
MULTIPLIER, MUL: 0.02
MULTIPLIER, MUL: 0.03
MULTIPLIER, MUL: 0.03
MULTIPLIER, MUL: 0.04
MULTIPLIER, MUL: 0.05
NRBC # BLD: 0 K/UL (ref 0–0.01)
NRBC BLD-RTO: 0 PER 100 WBC
ORDER INITIALS, ORDINIT: NORMAL
PLATELET # BLD AUTO: 234 K/UL (ref 150–400)
PMV BLD AUTO: 10.3 FL (ref 8.9–12.9)
POTASSIUM SERPL-SCNC: 3.9 MMOL/L (ref 3.5–5.1)
PROT SERPL-MCNC: 6.3 G/DL (ref 6.4–8.2)
RBC # BLD AUTO: 3.73 M/UL (ref 4.1–5.7)
SERVICE CMNT-IMP: ABNORMAL
SERVICE CMNT-IMP: NORMAL
SODIUM SERPL-SCNC: 139 MMOL/L (ref 136–145)
WBC # BLD AUTO: 23.7 K/UL (ref 4.1–11.1)

## 2019-05-16 PROCEDURE — 74011250637 HC RX REV CODE- 250/637: Performed by: NURSE PRACTITIONER

## 2019-05-16 PROCEDURE — 36415 COLL VENOUS BLD VENIPUNCTURE: CPT

## 2019-05-16 PROCEDURE — 82962 GLUCOSE BLOOD TEST: CPT

## 2019-05-16 PROCEDURE — 74011636637 HC RX REV CODE- 636/637: Performed by: PHYSICIAN ASSISTANT

## 2019-05-16 PROCEDURE — 74011250637 HC RX REV CODE- 250/637: Performed by: PHYSICIAN ASSISTANT

## 2019-05-16 PROCEDURE — 71045 X-RAY EXAM CHEST 1 VIEW: CPT

## 2019-05-16 PROCEDURE — 74011636637 HC RX REV CODE- 636/637: Performed by: THORACIC SURGERY (CARDIOTHORACIC VASCULAR SURGERY)

## 2019-05-16 PROCEDURE — 74011250636 HC RX REV CODE- 250/636: Performed by: PHYSICIAN ASSISTANT

## 2019-05-16 PROCEDURE — 83735 ASSAY OF MAGNESIUM: CPT

## 2019-05-16 PROCEDURE — 74011636637 HC RX REV CODE- 636/637: Performed by: NURSE PRACTITIONER

## 2019-05-16 PROCEDURE — 94640 AIRWAY INHALATION TREATMENT: CPT

## 2019-05-16 PROCEDURE — 80053 COMPREHEN METABOLIC PANEL: CPT

## 2019-05-16 PROCEDURE — 85027 COMPLETE CBC AUTOMATED: CPT

## 2019-05-16 PROCEDURE — 74011000250 HC RX REV CODE- 250: Performed by: PHYSICIAN ASSISTANT

## 2019-05-16 PROCEDURE — 65660000001 HC RM ICU INTERMED STEPDOWN

## 2019-05-16 PROCEDURE — 74011000258 HC RX REV CODE- 258: Performed by: PHYSICIAN ASSISTANT

## 2019-05-16 PROCEDURE — 74011250636 HC RX REV CODE- 250/636: Performed by: THORACIC SURGERY (CARDIOTHORACIC VASCULAR SURGERY)

## 2019-05-16 PROCEDURE — 74011000250 HC RX REV CODE- 250: Performed by: NURSE PRACTITIONER

## 2019-05-16 RX ORDER — SODIUM CHLORIDE 0.9 % (FLUSH) 0.9 %
5-40 SYRINGE (ML) INJECTION EVERY 8 HOURS
Status: DISCONTINUED | OUTPATIENT
Start: 2019-05-16 | End: 2019-05-20 | Stop reason: HOSPADM

## 2019-05-16 RX ORDER — LANOLIN ALCOHOL/MO/W.PET/CERES
1 CREAM (GRAM) TOPICAL
Status: DISCONTINUED | OUTPATIENT
Start: 2019-05-17 | End: 2019-05-17

## 2019-05-16 RX ORDER — INSULIN GLARGINE 100 [IU]/ML
10 INJECTION, SOLUTION SUBCUTANEOUS ONCE
Status: COMPLETED | OUTPATIENT
Start: 2019-05-16 | End: 2019-05-16

## 2019-05-16 RX ORDER — CODEINE PHOSPHATE AND GUAIFENESIN 10; 100 MG/5ML; MG/5ML
10 SOLUTION ORAL
Status: DISCONTINUED | OUTPATIENT
Start: 2019-05-16 | End: 2019-05-20 | Stop reason: HOSPADM

## 2019-05-16 RX ORDER — SODIUM CHLORIDE 0.9 % (FLUSH) 0.9 %
5-40 SYRINGE (ML) INJECTION AS NEEDED
Status: DISCONTINUED | OUTPATIENT
Start: 2019-05-16 | End: 2019-05-20 | Stop reason: HOSPADM

## 2019-05-16 RX ORDER — AZELASTINE 1 MG/ML
2 SPRAY, METERED NASAL 2 TIMES DAILY
Status: DISCONTINUED | OUTPATIENT
Start: 2019-05-16 | End: 2019-05-20 | Stop reason: HOSPADM

## 2019-05-16 RX ORDER — POTASSIUM CHLORIDE 29.8 MG/ML
20 INJECTION INTRAVENOUS ONCE
Status: COMPLETED | OUTPATIENT
Start: 2019-05-16 | End: 2019-05-16

## 2019-05-16 RX ORDER — PREDNISONE 20 MG/1
20 TABLET ORAL
Status: DISCONTINUED | OUTPATIENT
Start: 2019-05-16 | End: 2019-05-17

## 2019-05-16 RX ADMIN — AMIODARONE HYDROCHLORIDE 400 MG: 200 TABLET ORAL at 21:56

## 2019-05-16 RX ADMIN — BENZONATATE 100 MG: 100 CAPSULE ORAL at 21:56

## 2019-05-16 RX ADMIN — SENNOSIDES, DOCUSATE SODIUM 1 TABLET: 50; 8.6 TABLET, FILM COATED ORAL at 17:37

## 2019-05-16 RX ADMIN — Medication 2 G: at 04:22

## 2019-05-16 RX ADMIN — DIPHENHYDRAMINE HYDROCHLORIDE 25 MG: 25 CAPSULE ORAL at 21:56

## 2019-05-16 RX ADMIN — ACETAMINOPHEN 650 MG: 325 TABLET ORAL at 00:54

## 2019-05-16 RX ADMIN — Medication 10 ML: at 05:15

## 2019-05-16 RX ADMIN — OXYCODONE AND ACETAMINOPHEN 1 TABLET: 5; 325 TABLET ORAL at 12:26

## 2019-05-16 RX ADMIN — MUPIROCIN: 20 OINTMENT TOPICAL at 09:02

## 2019-05-16 RX ADMIN — MAGNESIUM OXIDE TAB 400 MG (241.3 MG ELEMENTAL MG) 400 MG: 400 (241.3 MG) TAB at 08:49

## 2019-05-16 RX ADMIN — PHENYLEPHRINE HYDROCHLORIDE 20 MCG/MIN: 10 INJECTION INTRAVENOUS at 00:53

## 2019-05-16 RX ADMIN — ARFORMOTEROL TARTRATE 15 MCG: 15 SOLUTION RESPIRATORY (INHALATION) at 07:27

## 2019-05-16 RX ADMIN — Medication 10 ML: at 14:25

## 2019-05-16 RX ADMIN — Medication 3 MG: at 21:56

## 2019-05-16 RX ADMIN — SODIUM CHLORIDE 0.9 UNITS/HR: 900 INJECTION, SOLUTION INTRAVENOUS at 05:59

## 2019-05-16 RX ADMIN — ARFORMOTEROL TARTRATE 15 MCG: 15 SOLUTION RESPIRATORY (INHALATION) at 21:24

## 2019-05-16 RX ADMIN — FLUTICASONE PROPIONATE 2 SPRAY: 50 SPRAY, METERED NASAL at 09:03

## 2019-05-16 RX ADMIN — POTASSIUM CHLORIDE 20 MEQ: 400 INJECTION, SOLUTION INTRAVENOUS at 06:06

## 2019-05-16 RX ADMIN — FAMOTIDINE 20 MG: 20 TABLET ORAL at 21:56

## 2019-05-16 RX ADMIN — ALPRAZOLAM 0.5 MG: 0.5 TABLET ORAL at 21:56

## 2019-05-16 RX ADMIN — POLYETHYLENE GLYCOL 3350 17 G: 17 POWDER, FOR SOLUTION ORAL at 08:50

## 2019-05-16 RX ADMIN — CETIRIZINE HYDROCHLORIDE 10 MG: 10 TABLET, FILM COATED ORAL at 08:49

## 2019-05-16 RX ADMIN — MUPIROCIN: 20 OINTMENT TOPICAL at 17:38

## 2019-05-16 RX ADMIN — GUAIFENESIN 600 MG: 600 TABLET, EXTENDED RELEASE ORAL at 08:49

## 2019-05-16 RX ADMIN — BUDESONIDE 500 MCG: 0.5 INHALANT RESPIRATORY (INHALATION) at 21:24

## 2019-05-16 RX ADMIN — MAGNESIUM OXIDE TAB 400 MG (241.3 MG ELEMENTAL MG) 400 MG: 400 (241.3 MG) TAB at 17:38

## 2019-05-16 RX ADMIN — Medication 10 ML: at 21:56

## 2019-05-16 RX ADMIN — Medication 20 ML: at 05:15

## 2019-05-16 RX ADMIN — CHLORHEXIDINE GLUCONATE 10 ML: 1.2 RINSE ORAL at 17:38

## 2019-05-16 RX ADMIN — INSULIN LISPRO 2 UNITS: 100 INJECTION, SOLUTION INTRAVENOUS; SUBCUTANEOUS at 17:37

## 2019-05-16 RX ADMIN — AZELASTINE HYDROCHLORIDE 2 SPRAY: 137 SPRAY, METERED NASAL at 09:37

## 2019-05-16 RX ADMIN — ASPIRIN 81 MG 81 MG: 81 TABLET ORAL at 08:49

## 2019-05-16 RX ADMIN — INSULIN GLARGINE 10 UNITS: 100 INJECTION, SOLUTION SUBCUTANEOUS at 13:52

## 2019-05-16 RX ADMIN — GUAIFENESIN 600 MG: 600 TABLET, EXTENDED RELEASE ORAL at 21:56

## 2019-05-16 RX ADMIN — INSULIN LISPRO 3 UNITS: 100 INJECTION, SOLUTION INTRAVENOUS; SUBCUTANEOUS at 09:42

## 2019-05-16 RX ADMIN — BENZONATATE 100 MG: 100 CAPSULE ORAL at 17:37

## 2019-05-16 RX ADMIN — AMIODARONE HYDROCHLORIDE 400 MG: 200 TABLET ORAL at 08:49

## 2019-05-16 RX ADMIN — ALPRAZOLAM 0.5 MG: 0.5 TABLET ORAL at 13:52

## 2019-05-16 RX ADMIN — BENZONATATE 100 MG: 100 CAPSULE ORAL at 08:49

## 2019-05-16 RX ADMIN — FAMOTIDINE 20 MG: 20 TABLET ORAL at 08:49

## 2019-05-16 RX ADMIN — PREDNISONE 20 MG: 20 TABLET ORAL at 12:26

## 2019-05-16 RX ADMIN — BUDESONIDE 500 MCG: 0.5 INHALANT RESPIRATORY (INHALATION) at 07:27

## 2019-05-16 RX ADMIN — FINASTERIDE 5 MG: 5 TABLET, FILM COATED ORAL at 10:18

## 2019-05-16 RX ADMIN — SENNOSIDES, DOCUSATE SODIUM 1 TABLET: 50; 8.6 TABLET, FILM COATED ORAL at 08:50

## 2019-05-16 RX ADMIN — MONTELUKAST SODIUM 10 MG: 10 TABLET, FILM COATED ORAL at 21:56

## 2019-05-16 RX ADMIN — AZELASTINE HYDROCHLORIDE 2 SPRAY: 137 SPRAY, METERED NASAL at 17:38

## 2019-05-16 RX ADMIN — TAMSULOSIN HYDROCHLORIDE 0.8 MG: 0.4 CAPSULE ORAL at 08:49

## 2019-05-16 RX ADMIN — CHLORHEXIDINE GLUCONATE 10 ML: 1.2 RINSE ORAL at 09:02

## 2019-05-16 RX ADMIN — ATORVASTATIN CALCIUM 40 MG: 40 TABLET, FILM COATED ORAL at 08:49

## 2019-05-16 RX ADMIN — Medication 10 ML: at 21:57

## 2019-05-16 RX ADMIN — OXYCODONE AND ACETAMINOPHEN 1 TABLET: 5; 325 TABLET ORAL at 07:14

## 2019-05-16 NOTE — PROGRESS NOTES
Physical Therapy 5/16/2019 Chart reviewed in preparation for therapy session. Noted patient up to chair this AM with nursing assistance. Patient now in bed for MAC removal. Bed rest x 1 hour following. Acute skilled PT services to follow up later as able. Thank you. Aubrey Freeman, PT, DPT Recommendation for Nursing: Patient to complete as able in order to maintain strength, endurance, and functional independence 1. OOB to chair 3x daily, preferably for meals, with A x 1-2, gait belt, and reinforcement of sternal precautions 2. UE/cardiac exercises (page 4-5 in booklet), exercises 1-4, x 5 repetitions of each as tolerated 3. Review of sternal precautions (3 P's) with family/nursing staff and \"chest splinting\" while coughing/sneezing/vomiting Thank you for your assistance.

## 2019-05-16 NOTE — PROGRESS NOTES
Spiritual Care Assessment/Progress Note [address] 
 
 
NAME: Mari Clemons      MRN: [id number] AGE: 75 y.o. SEX: male Latter-day Affiliation: No Druze Language: [place] 5/16/2019     Total Time (in minutes): 5 Spiritual Assessment begun in [facility] 4 CV INTNSV CARE through conversation with: 
  
    [x]Patient        [] Family    [] Friend(s) Reason for Consult: Initial/Spiritual assessment, critical care Spiritual beliefs: (Please include comment if needed) 
   [] Identifies with a owen tradition:     
   [] Supported by a owen community:        
   [x] Claims no spiritual orientation:       
   [] Seeking spiritual identity:            
   [] Adheres to an individual form of spirituality:       
   [] Not able to assess:                   
 
    
Identified resources for coping:  
   [] Prayer                           
   [] Music                  [] Guided Imagery [x] Family/friends                 [] Pet visits [] Devotional reading                         [] Unknown 
   [] Other:                                       
 
 
Interventions offered during this visit: (See comments for more details) Patient Interventions: Catharsis/review of pertinent events in supportive environment, Affirmation of emotions/emotional suffering, Initial/Spiritual assessment, Critical care Plan of Care: 
 
 [x] Support spiritual and/or cultural needs  
 [] Support AMD and/or advance care planning process    
 [] Support grieving process 
 [] Coordinate Rites and/or Rituals  
 [] Coordination with community clergy [] No spiritual needs identified at this time 
 [] Detailed Plan of Care below (See Comments)  [] Make referral to Music Therapy 
[] Make referral to Pet Therapy    
[] Make referral to Addiction services 
[] Make referral to [facility] 
[] Make referral to Spiritual Care Partner 
[] No future visits requested       
[x] Follow up visits as needed Comments:  Visited Mr Izaiah Thorpe in [facility] for initial spiritual assessment. Mr Izaiah Thorpe was sitting up in bed and appeared in pretty good spirits. Provided active listening as he shared about his health issues. He said that he thought he was doing pretty good but still had additional issues that needed to be taken care of. Mr Izaiah Thorpe stated that he had two grandchildren and he talked about how much he enjoyed them. He denied having any concerns to share with . Mr Izaiah Thorpe stated that he was not affiliated with any owen community and said he had written a poem about Yarsani beliefs. When asked to expound on the poem he said he didn't want to get into it but that it was basically how everyone had the right to believe what they wanted or to believe in nothing if they wanted; acknowledged patient's beliefs. He shared that his wife was Yarsani and identified as being [address] patient that  were available for emotional as well as spiritual support. He expressed appreciation for visit. : . Chivo Lara. Esdras White; [facility], to contact [phone] call: [phone]

## 2019-05-16 NOTE — PROGRESS NOTES
2000: Received report from Arabella Rose, TRUDY. Lu dual verified. Assumed care of pt, assessment performed. 0400: Labs sent, xray at bedside. 1649: Ryan off. BP stable, MAPs > 65.  
 
0800: Bedside shift change report given to Juan Miguel Talley RN (oncoming nurse) by Horacio Romero RN (offgoing nurse). Report included the following information SBAR, ED Summary, OR Summary, Intake/Output, MAR, Recent Results and Cardiac Rhythm NSR.

## 2019-05-16 NOTE — PROGRESS NOTES
1155:  TRANSFER - IN REPORT: 
 
Verbal report received from Kasey(name) on Nuha Franco  being received from CVICU (unit) for routine progression of care Report consisted of patients Situation, Background, Assessment and  
Recommendations(SBAR). Information from the following report(s) SBAR, Kardex, [facility] and Recent Results was reviewed with the receiving nurse. Opportunity for questions and clarification was provided. Assessment completed upon patients arrival to unit and care assumed at 1419. Vitals obtained, tele applied, call bell in reach. No needs at this time. 1930:  Bedside shift change report given to Navid (oncoming nurse) by Alo Molina (offgoing nurse). Report included the following information SBAR, Kardex, MAR and Recent Results.

## 2019-05-16 NOTE — DIABETES MGMT
DTC Cardiac Surgery Progress Note Recommendations/ Comments:  Pt has received insulin for transition of insulin drip (Lantus 10 units at 1352) 1) transition off gtt per [employer] Protocol 2) continue accu-checks and humalog correctional insulin ac & hs until pt has 3 consecutive BG <150mg/dl off gtt then discontinue Insulin gtt should not be stopped until after 1635 on 5/16/19 to complete 48hr post-op time frame. Currently on insulin gtt running @ 2.3 units/hr and last blood glucose = 117 mg/dL @1247. Chart reviewed on David Antonio. Patient is 75 y.o. male s/p Cardiac surgery  POD 2. No noted history of diabetes. A1c:  
Lab Results Component Value Date/Time Hemoglobin A1c 5.6 05/09/2019 02:33 PM  
 
 
 
Recent Glucose Results:  
Lab Results Component Value Date/Time  (H) 05/16/2019 04:01 AM  
 GLUCPOC 117 (H) 05/16/2019 12:47 PM  
 GLUCPOC 133 (H) 05/16/2019 11:42 AM  
 GLUCPOC 142 (H) 05/16/2019 10:38 AM  
  
 
Lab Results Component Value Date/Time Creatinine 0.96 05/16/2019 04:01 AM  
 
Estimated Creatinine Clearance: 62.2 mL/min (based on SCr of 0.96 mg/dL). Active Orders Diet DIET REGULAR 2 GM NA (House Low NA); No Conc. Sweets PO intake:  
Patient Vitals for the past 72 hrs: 
 % Diet Eaten 05/16/19 1300 50 % 05/16/19 1100 50 % 05/15/19 1300 10 % 05/15/19 0900 10 % 05/14/19 0801 0 % Will continue to follow as needed. Thank you. Sofiya Flores, MS, RN, CDE Time spent: 8 minutes

## 2019-05-16 NOTE — PROGRESS NOTES
Occupational Therapy 5/16/2019 
 
1113: Chart reviewed in preparation for therapy session. Noted patient up to chair this AM with nursing assistance. Patient now in bed for MAC removal. Bed rest x 1 hour following. Will follow up with OT treatment later as able & appropriate. 1446: Discussed with CVICU RN, patient up in the chair from midnight to ~1400, returned to supine, provided Xanax, and transferred to CVSU. To promote maximal participation in therapy for continued progress & sleep hygiene, will follow up tomorrow with OT treatment. Thank you Frederick Berrios, KAREN, OTR/L Recommendation for Nursing: Patient to complete as able in order to maintain strength, endurance, and functional independence 1. OOB to chair 3x daily, preferably for meals, with A x 1-2, gait belt, and reinforcement of sternal precautions 2. UE/cardiac exercises (page 4-5 in booklet), exercises 1-4, x 5 repetitions of each as tolerated 3. Review of sternal precautions (3 P's) with family/nursing staff and \"chest splinting\" while coughing/sneezing/vomiting Thank you for your assistance.

## 2019-05-16 NOTE — PROGRESS NOTES
0800 - Report received on patient. He is on Amio, insulin and MIV. Patient is sitting up in a chair currently, anxious, persistently asking about cough medications. Reassured him that he is on a few meds for coughing and that I will provide him with a list for his information. 0830 - cardiac surgery rounded on patient. Plans to take lines out today, transfer to stepdown. 1100 - MAC and art line removed as ordered, manual pressure applied, occlusive dressing in place. 1155 - TRANSFER - OUT REPORT: 
 
Verbal report given to CVSU RN(name) on Tank Hong  being transferred to CVSU(unit) for routine progression of care Report consisted of patients Situation, Background, Assessment and  
Recommendations(SBAR). Information from the following report(s) SBAR, Kardex, OR Summary, Intake/Output, MAR, Accordion, Recent Results and Cardiac Rhythm SR was reviewed with the receiving nurse. Lines:  
Peripheral IV 05/09/19 Right Antecubital (Active) Site Assessment Clean, dry, & intact 5/16/2019  3:08 PM  
Phlebitis Assessment 0 5/16/2019  3:08 PM  
Infiltration Assessment 0 5/16/2019  3:08 PM  
Dressing Status Clean, dry, & intact 5/16/2019  3:08 PM  
Dressing Type Transparent;Tape 5/16/2019  3:08 PM  
Hub Color/Line Status Pink; Infusing 5/16/2019  3:08 PM  
Action Taken Open ports on tubing capped 5/16/2019  3:08 PM  
Alcohol Cap Used Yes 5/16/2019  3:08 PM  
   
Peripheral IV 05/15/19 Anterior;Distal;Left Forearm (Active) Site Assessment Clean, dry, & intact 5/16/2019  3:08 PM  
Phlebitis Assessment 0 5/16/2019  3:08 PM  
Infiltration Assessment 0 5/16/2019  3:08 PM  
Dressing Status Clean, dry, & intact 5/16/2019  3:08 PM  
Dressing Type Transparent;Tape 5/16/2019  3:08 PM  
Hub Color/Line Status Pink;Capped 5/16/2019  3:08 PM  
Action Taken Open ports on tubing capped 5/16/2019  3:08 PM  
Alcohol Cap Used Yes 5/16/2019  3:08 PM  
  
 
Opportunity for questions and clarification was provided. 1400 - Patient transported with: 
 Monitor O2 @ 3 liters Patient-specific medications from Pharmacy Registered Nurse Tech

## 2019-05-16 NOTE — PROGRESS NOTES
NUTRITION COMPLETE ASSESSMENT 
 
RECOMMENDATIONS:  
1. Encourage PO intake with meals 
 - should be consuming at least 50% meals (100% protein foods)  
 - family may bring foods from home 2. Add Ensure BID - if patient likes trial of Ensure 3. Add daily MVI if declining oral nutrition supplements Interventions/Plan:  
Food/Nutrient Delivery:  (diet liberalization) Commercial supplement(trial Ensure) Nutrition Education:    
 
Assessment:  
Reason for Assessment: [x]LOS /at Nutrition Risk Diet: Cardiac(NCS) Supplements: none Nutritionally Significant Medications: [x] Reviewed & Includes: albumin, amiodarone, pepcid, SSI, mag-ox, miralax, KCl, prednisone, pericolace, amiodarone Meal Intake:  
Patient Vitals for the past 100 hrs: 
 % Diet Eaten 05/15/19 1300 10 % 05/15/19 0900 10 % 05/14/19 0801 0 % Pre-Hospitalization: 
Usual Appetite: Poor Diet at Home: regular Vitamins/Supplements: No 
 
Current Hospitalization:  
Appetite: Poor PO Ability: Independent Average po intake:Less than 25% Average supplements intake:    
  
Subjective: \"This is the lowest weight I have been since high school. .. I could really go for a milkshake\" Objective: 
Pt admitted for CAD. PMHx: CAD, GERD, COPD. S/p CABG x 3 on 5/14. Wt loss of 10-15# x 6 weeks PTA  reported. No consult received and negative malnutrition screen on admit. High risk for EGD so UGI completed on 5/13 - showing just small hiatal hernia. May be related to reflux - GI recommending outpatient follow-up. Pepcid rx. Pt visited today. Fair appetite noted. Admits to poor appetite for about 3 months PTA with altered taste and minimal appetite. Distracted during visit with his main concern his breathing issues. Food preferences also a barrier, encourage him to have wife bring foods from home to improve PO. Discussed importance of good po intake for recovery, especially protein. Has never tried Ensure so will send just 1 for pt to try. If pt likes Ensure please add 2x/day (350kcal, 20g protein each). Declining snacks at this time. Severe wt loss of 8% x 3 months. Patient meets criteria for Severe Chronic Protein Calorie Malnutrition as evidenced by:  
ASPEN Malnutrition Criteria Acute Illness, Chronic Illness, or Social/Enviornmental: Chronic illness Energy Intake: Less than/equal to 75% of est energy req for greater than/equal to 1 month Weight Loss: Greater than 7.5% x 3 mos ASPEN Malnutrition Score - Chronic Illness: 12 
Chronic Illness - Malnutrition Diagnosis: Severe malnutrition. Will continue to follow for PO intake, supplement acceptance, wt trends. Estimated Nutrition Needs:  
Kcals/day: 9910 Kcals/day(1858-2000kcal) Protein: 82 g(82-97g (1.1-1.3g/kg)) Fluid: 1900 ml(1ml/kcal) Based On: [place] Jeor(x 1.3-1.4) Weight Used: Actual wt(74.4kg) Pt expected to meet estimated nutrient needs:  []   Yes     []  No [x] Unable to predict at this time Nutrition Diagnosis: 1. Malnutrition related to inadequate protein/energy intake 2/2 poor appetite & altered taste as evidenced by severe wt loss of 8% and <75% meals x 3 months Goals:   
 Consumption of at least 75% meals and 1 supplement/day in 5-7 days Monitoring & Evaluation:  
 - Liquid meal replacement, Total energy intake, Protein intake - Weight/weight change, GI 
 
Previous Nutrition Goals Met:   N/A Previous Recommendations:    N/A Education & Discharge Needs: 
 [] None Identified 
 [x] Identified and addressed  
 [] Participated in care plan, discharge planning, and/or interdisciplinary rounds Cultural, Jewish and ethnic food preferences identified: None Skin Integrity: []Intact  [x]Other: sternal wound Edema: [x]None []Other Last BM: 5/14 Food Allergies: [x]None []Other Diet Restrictions: Cultural/Amish Preference(s): None Anthropometrics: Weight Loss Metrics 5/16/2019 5/9/2019 6/25/2013 6/24/2013 6/19/2013 Today's Wt 164 lb - 178 lb 1.6 oz - 175 lb 4 oz BMI - 25.69 kg/m2 - 27.47 kg/m2 27.03 kg/m2 Weight Source: Standing scale (comment) Height: 5' 7\" (170.2 cm), Body mass index is 25.69 kg/m². IBW : 67.1 kg (148 lb), % IBW (Calculated): 110.81 % Usual Body Weight: 80.7 kg (178 lb),   
 
Labs:   
Lab Results Component Value Date/Time Sodium 139 05/16/2019 04:01 AM  
 Potassium 3.9 05/16/2019 04:01 AM  
 Chloride 110 (H) 05/16/2019 04:01 AM  
 CO2 23 05/16/2019 04:01 AM  
 Glucose 101 (H) 05/16/2019 04:01 AM  
 BUN 17 05/16/2019 04:01 AM  
 Creatinine 0.96 05/16/2019 04:01 AM  
 Calcium 8.7 05/16/2019 04:01 AM  
 Magnesium 2.4 05/16/2019 04:01 AM  
 Albumin 3.6 05/16/2019 04:01 AM  
 
Lab Results Component Value Date/Time Hemoglobin A1c 5.6 05/09/2019 02:33 PM  
 
 
Marty Tiwari, [address] Pager #[phone] or [phone]

## 2019-05-16 NOTE — PROGRESS NOTES
[facility] ICU Progress Note Admit Date: 2019 POD:  2 Day Post-Op Procedure:  Procedure(s): 
CABG x 3 WITH CPB; DONOR SITES: LIMA AND RIGHT SAPHENOUS VEIN VIA EVH; CÉSAR AND EPI AORTIC BY DR. Laurent Cox. Subjective:  
Pt seen with Dr. Gamal Chau. On insulin, amio ggts. Remains concerned about his chronic cough. On 2L NC, 93%. Objective:  
Vitals: 
Blood pressure 96/81, pulse 73, temperature 98 °F (36.7 °C), resp. rate 19, height 5' 7\" (1.702 m), weight 164 lb (74.4 kg), SpO2 93 %. Temp (24hrs), Av °F (36.7 °C), Min:97.6 °F (36.4 °C), Max:98.4 °F (36.9 °C) Hemodynamics: 
 CO: CO (l/min): 4.5 l/min CI: CI (l/min/m2): 2.5 l/min/m2 CVP: CVP (mmHg): 11 mmHg (19 1745) SVR: SVR (dyne*sec)/cm5: 953 (dyne*sec)/cm5 (19 1700) PAP Systolic: PAP Systolic: 32 (61/40/58 5191) PAP Diastolic: PAP Diastolic: 9 (51/30/71 8283) PVR:   
 SV02: SVO2 (%): 75 % (19 1730) SCV02:   
 
EKG/Rhythm: A paced, SR under CT Output: 340 (90 in 12 hrs) Oxygen Therapy: 
Oxygen Therapy O2 Sat (%): 93 % (19 07) Pulse via Oximetry: 71 beats per minute (19 07) O2 Device: Room air (19) O2 Flow Rate (L/min): 2 l/min (19 0400) FIO2 (%): 50 % (19 1800) CXR: 
CXR Results  (Last 48 hours) 19 0429  XR CHEST PORT Final result Impression:  IMPRESSION: No significant change. Narrative:  INDICATION:  postop heart, CABG x3 2019. EXAM: CXR Portable. FINDINGS: Portable chest shows support lines/devices appear unchanged since  
yesterday. There is no apparent pneumothorax. Lungs show no acute findings. Heart size is stable. There is no overt pulmonary edema. 05/15/19 0500  XR CHEST PORT Final result Impression:  IMPRESSION:  
Removal of pulmonary artery catheter. Focal opacity in the left lung improved. Otherwise no significant change. Narrative:  PORTABLE CHEST RADIOGRAPH/S: 5/15/2019 5:00 AM  
   
Clinical history: Postoperative heart INDICATION:   postop heart COMPARISON: 5/14/2019 FINDINGS:  
AP portable upright view of the chest demonstrates stable  cardiopericardial  
silhouette. The lungs are adequately expanded. Left-sided pleural drainage  
catheter with evidence of significant residual pneumothorax. Mediastinal drain. Mabeline Sink There is no edema, effusion, consolidation, or pneumothorax. The osseous  
structures are unremarkable poststernotomy. Pulmonary artery catheter has been  
removed. Airspace opacity in the left lung). . Patient is on a cardiac monitor. 05/14/19 1652  XR CHEST PORT Final result Impression:  IMPRESSION:  
1. Lines and tubes as detailed above with probable atelectasis left mid chest  
post median sternotomy Narrative:  INDICATION:  postop heart EXAM: Portable chest 1639 hours. Comparison May 10, 2019. FINDINGS: Multiple monitoring leads overlie the chest.   
   
 Endotracheal tube overlies the trachea at the level of the clavicles. . Gastric  
tube overlies the stomach. The proximal sidehole is at the gastroesophageal  
junction. The tube can be advanced approximately 10 cm There is a left-sided chest tube. Right neck pulmonary artery catheter with its  
tip at the region of the main pulmonary artery. Probable mediastinal drain. No pneumothorax. Lung volumes are decreased. There is patchy opacity in the  
left mid chest likely representing atelectasis Admission Weight: Last Weight Weight: 161 lb (73 kg) Weight: 164 lb (74.4 kg) Intake / Rosslyn Shala / Drain: 
Current Shift: 05/16 0701 - 05/16 1900 In: -  
Out: 550 [Urine:550] Last 24 hrs.:  
 
Intake/Output Summary (Last 24 hours) at 5/16/2019 0750 Last data filed at 5/16/2019 8382 Gross per 24 hour Intake 3066.77 ml Output 1840 ml Net 1226.77 ml EXAM: 
 General:   NAD Lungs:   Occasional rhonchi bilaterally. Incision:  Mid sternal incision clean, dry, intact Heart:  Regular rate and rhythm, S1, S2 normal, no murmur, click, rub or gallop. Abdomen:   Soft, non-tender. Bowel sounds normal. No masses,  No organomegaly. Extremities:  No edema. PPP. Neurologic:  Gross motor and sensory apparatus intact. Labs:  
Recent Labs 19 
0719  19 
0401  19 
1658 WBC  --   --  23.7*   < > 29.7* HGB  --   --  11.6*   < > 13.0 HCT  --   --  34.9*   < > 39.4 PLT  --   --  234   < > 257 NA  --   --  139   < > 143 K  --   --  3.9   < > 3.8 BUN  --   --  17   < > 21* CREA  --   --  0.96   < > 0.98  
GLU  --   --  101*   < > 141* GLUCPOC 83   < > 103*   < >  --   
INR  --   --   --   --  1.1  
 < > = values in this interval not displayed. Assessment:  
 
Active Problems: 
  CAD (coronary artery disease) (2019) S/P CABG x 3 (2019) Overview: CABG X 3 LIMA to LAD, RSVG to RAMUS, RSVG to RCA 
    RIGHT ENDOSCOPIC [address] 55 Plan/Recommendations/Medical Decision Makin.  CAD s/p CABG, EF 65%: ASA/Statin, hold BB due to hypotension. 2. Dysphagia/Weight loss/new onset GERD/? Pancreatic mass: MRI negative for pancreatic mass. UGI Series shows small hiatal hernia & no other acute findings. GI recommends outpatient follow up & has signed off. Has been able to swallow foods post-operatively. 3. Hx of R carotid endarectomy: stable 4. Hx of PVD,(L SFA BAP 2007, R SFA atherectomy 3/2018): stable 5. COPD:  PRN duo-nebs, steroid nebs. Zrytec, Flonase, Singulair. Pulmonary following, recommended adding nasal azelastine. Chest CT negative, Prednisone decreased to 20mg daily. 6. HLD:  Cont statin.   
 
7. BPH:  cont flomax/proscar. Making urine on own. 8. SR w/ 1st deg block: Sinus atiya post op. Holding BB due to hypotension. 9. Anticipated acute post op resp insufficiency: wean NC for O2 >90%, IS, activity as tolerated. Pulmonary toileting. 10. Leukocytosis: Likely related to post op state/steroids. CXR clear. Monitor. Dispo: Deline. Keep chest tubes today per Dr. Nelda Rolle until more mobile. Transfer to stepdown.  
 
Signed By: WAGNER Herman

## 2019-05-16 NOTE — ANESTHESIA POSTPROCEDURE EVALUATION
Procedure(s): 
CABG x 3 WITH CPB; DONOR SITES: LIMA AND RIGHT SAPHENOUS VEIN VIA EVH; CÉSAR AND EPI AORTIC BY DR. Anders Jansen. Melissa Gibbs general 
 
Anesthesia Post Evaluation Patient location during evaluation: PACU Note status: Adequate. Level of consciousness: responsive to verbal stimuli and sleepy but conscious Pain management: satisfactory to patient Airway patency: patent Anesthetic complications: no 
Cardiovascular status: acceptable Respiratory status: acceptable Hydration status: acceptable Comments: +Post-Anesthesia Evaluation and Assessment Patient: Chiara Crawford MRN: [id number]  SSN: [id number] YOB: 1945  Age: 75 y.o. Sex: male Cardiovascular Function/Vital Signs BP 96/81   Pulse 73   Temp 36.7 °C (98 °F)   Resp 19   Ht 5' 7\" (1.702 m)   Wt 74.4 kg (164 lb)   SpO2 91%   BMI 25.69 kg/m² Patient is status post Procedure(s): 
CABG x 3 WITH CPB; DONOR SITES: LIMA AND RIGHT SAPHENOUS VEIN VIA EVH; CÉSAR AND EPI AORTIC BY DR. Anders Jansen. Melissa Gibbs Nausea/Vomiting: Controlled. Postoperative hydration reviewed and adequate. Pain: 
Pain Scale 1: Numeric (0 - 10) (05/16/19 0400) Pain Intensity 1: 0(pt denies pain at this time) (05/16/19 0400) Managed. Neurological Status: At baseline. Mental Status and Level of Consciousness: Arousable. Pulmonary Status:  
O2 Device: Nasal cannula (05/16/19 0400) Adequate oxygenation and airway patent. Complications related to anesthesia: None Post-anesthesia assessment completed. No concerns. I have evaluated the patient and the patient is stable and ready to be discharged from PACU . Signed By: Denice Larsen MD  
 5/16/2019 Vitals Value Taken Time BP 96/81 5/16/2019  7:00 AM  
Temp Pulse 74 5/16/2019  7:12 AM  
Resp 19 5/16/2019  7:12 AM  
SpO2 91 % 5/16/2019  7:12 AM  
Vitals shown include unvalidated device data.

## 2019-05-16 NOTE — PROGRESS NOTES
Problem: Falls - Risk of 
Goal: *Absence of Falls Description Document Luba Ingram Fall Risk and appropriate interventions in the flowsheet. Outcome: Progressing Towards Goal 
Note:  
Fall Risk Interventions: 
Mobility Interventions: Communicate number of staff needed for ambulation/transfer, Patient to call before getting OOB, Strengthening exercises (ROM-active/passive) Mentation Interventions: Adequate sleep, hydration, pain control, Door open when patient unattended, Evaluate medications/consider consulting pharmacy, Increase mobility, More frequent rounding, Reorient patient, Room close to nurse's station, Toileting rounds, Update white board Medication Interventions: Evaluate medications/consider consulting pharmacy, Patient to call before getting OOB, Teach patient to arise slowly Elimination Interventions: Call light in reach, Patient to call for help with toileting needs, Toilet paper/wipes in reach, Toileting schedule/hourly rounds, Urinal in reach Problem: Pressure Injury - Risk of 
Goal: *Prevention of pressure injury Description Document Gian Scale and appropriate interventions in the flowsheet. Outcome: Progressing Towards Goal 
Note:  
Pressure Injury Interventions: 
Sensory Interventions: Assess changes in LOC, Check visual cues for pain, Float heels, Keep linens dry and wrinkle-free, Maintain/enhance activity level, Minimize linen layers, Turn and reposition approx. every two hours (pillows and wedges if needed) Activity Interventions: Assess need for specialty bed, Increase time out of bed, Pressure redistribution bed/mattress(bed type) Mobility Interventions: Float heels, Pressure redistribution bed/mattress (bed type), Turn and reposition approx. every two hours(pillow and wedges) Nutrition Interventions: Document food/fluid/supplement intake Friction and Shear Interventions: Lift sheet, Minimize layers Problem: CABG: Post-Op Day 1 
 Goal: *Hemodynamically stable without vasoactive medications Outcome: Progressing Towards Goal 
Note:  
Weaning katelynn as pt tolerates Goal: *Optimal pain control at patient's stated goal 
Outcome: Progressing Towards Goal 
Goal: *Demonstrates progressive activity Outcome: Progressing Towards Goal

## 2019-05-16 NOTE — OP NOTES
[address]  OPERATIVE REPORT    Name:  Erich Dodson  MR#:  [id number]  :  1945  ACCOUNT #:  [de-identified]  DATE OF SERVICE:  2019    PREOPERATIVE DIAGNOSES:  1. Ejection fraction of 55%. 2.  Symptoms on admission:  A. Unstable angina. 3.  Symptoms on surgery:  A. Unstable angina. 4.  Mild chronic lung disease (note the patient had an FEV1 of only 1.75, which is only 65% predicted, consistent with mild chronic lung disease). 5.  Current every day tobacco smoker. 6.  The patient drinks alcohol of 1-7 drinks per week. 7.  Cerebrovascular disease (note the patient had moderate stenosis of the left internal carotid artery). 8.  Previous transient ischemic attack. 9.  Peripheral arterial disease (note the patient had a left common femoral artery angioplasty in the past). 10.  Hypertension. 11.  Three-vessel coronary artery disease. 12.  Urgent coronary artery bypass grafting due to severe disease. POSTOPERATIVE DIAGNOSES:  1. Ejection fraction of 55%. 2.  Symptoms on admission:  A. Unstable angina. 3.  Symptoms on surgery:  A. Unstable angina. 4.  Mild chronic lung disease (note the patient had an FEV1 of only 1.75, which is only 65% predicted, consistent with mild chronic lung disease). 5.  Current every day tobacco smoker. 6.  The patient drinks alcohol of 1-7 drinks per week. 7.  Cerebrovascular disease (note the patient had moderate stenosis of the left internal carotid artery). 8.  Previous transient ischemic attack. 9.  Peripheral arterial disease (note the patient had a left common femoral artery angioplasty in the past). 10.  Hypertension. 11.  Three-vessel coronary artery disease. 12.  Urgent coronary artery bypass grafting due to severe disease. PROCEDURES PERFORMED:  1.   Coronary artery bypass graft x3 (left internal mammary artery to left anterior descending artery; saphenous vein graft to ramus intermedius; saphenous vein graft to right coronary artery). 2.  Endoscopic vein harvesting of the right lower extremity. SURGEON:  Lu Dacosta MD    ASSISTANT:  Giovanna Dixon PA-C    ANESTHESIA:  General endotracheal anesthesia. COMPLICATIONS:  None. SPECIMENS REMOVED:  None. IMPLANTS:  none. ESTIMATED BLOOD LOSS:  50 mL. PROCEDURE:  The patient is a very pleasant 69-year-old gentleman recently diagnosed with multivessel coronary artery disease. He is now being brought to the operating room for coronary artery bypass grafting. The patient was brought to the operating room, had a right radial A-line placed without complications, [place]-Alis catheter placed without complications, general endotracheal anesthesia without complications. Next, the patient's chest, abdomen and lower extremities were prepped and draped in the usual sterile fashion. A midline incision was made on the patient's sternum, cut and dissected down along the sternal bone with a sagittal saw and left pleural space was entered. During this portion of the procedure, endoscopic vein harvesting was performed on the right lower extremity without complications. Once the left pleural space was entered, the left internal mammary artery was carefully dissected off the chest wall, taking care to place the proximal clips on each portion of the internal mammary branch. Once this was completely dissected off the chest wall, an appropriate dose of heparin was given. Three minutes passed. Large distal clips were placed and the artery was cut off the chest wall. There was good flow at this stage. Next, the pericardium was opened and pursestring sutures were applied. Once the ACT was above 460, we performed epiaortic ultrasound. He did have some atherosclerotic plaque in the aortic arch. This area was avoided during cannulation. We then placed 2 pursestring sutures in the ascending aorta and cannulated with a normal aortic perfuser.   This was de-aired and hooked up to bypass circuit. We then made a small V in the pericardium and matured the left internal mammary artery to a length of appropriate size up to the left anterior descending artery. We then placed a pursestring suture in the right atrium, placed a 2-stage venous cannula. This was de-aired and hooked up to the bypass circuit. We went on with cardiopulmonary bypass, placed an antegrade cardioplegia needle, de-aired cardioplegia line and hooked up to the cannula, which was cross-clamped, pumped back up again. We gave 750 mL of initial cardioplegia, however, required approximately 400 mL. We then tilted up the heart and dissected out the ramus intermedius vessel with a 69 blade and further extended the arteriotomy with forward and reverse Arevalo. We then performed the saphenous vein graft to ramus intermedius anastomosis using 7-0 Prolene suture. We then measured the vein graft length to appropriate size and similarly cut it. We then made a similar anastomosis to right coronary artery and cut the vein graft. We then gave initial cardioplegia. We then dissected out the left anterior descending artery with the 69 blade and further extended the arteriotomy with forward and reverse Arevalo. We then performed the LIMA to LAD anastomosis with 8-0 Prolene suture. W e then made 2 small nicks in the ascending aorta, used a 4-mm punch x2. We then performed proximal anastomosis in the right and left side grafts. Care was taken to de-air the ascending aorta and for tying it down. We then brought the head of the bed up, pumped it down with the cross-clamp, pumped it back up again, the temperature reached 36.2, successfully came off with coronary artery bypass. We placed the A and V wires, AC locators and Paulie drains. We measured flows in all the graft and they were excellent. Wires were used to close the sternum and Vicryl suture was used to close the subcutaneous tissue.         Lisha Carrasco MD RUBIN/[email or web address]  D:  05/15/2019 9:01  T:  05/15/2019 16:34  JOB #:  [id number]

## 2019-05-17 ENCOUNTER — APPOINTMENT (OUTPATIENT)
Dept: GENERAL RADIOLOGY | Age: 74
DRG: 234 | End: 2019-05-17
Attending: PHYSICIAN ASSISTANT
Payer: MEDICARE

## 2019-05-17 LAB
ALBUMIN SERPL-MCNC: 3.1 G/DL (ref 3.5–5)
ALBUMIN/GLOB SERPL: 1 {RATIO} (ref 1.1–2.2)
ALP SERPL-CCNC: 72 U/L (ref 45–117)
ALT SERPL-CCNC: 9 U/L (ref 12–78)
ANION GAP SERPL CALC-SCNC: 6 MMOL/L (ref 5–15)
APTT PPP: 32.2 SEC (ref 22.1–32)
AST SERPL-CCNC: 23 U/L (ref 15–37)
BILIRUB SERPL-MCNC: 0.5 MG/DL (ref 0.2–1)
BUN SERPL-MCNC: 21 MG/DL (ref 6–20)
BUN/CREAT SERPL: 20 (ref 12–20)
CALCIUM SERPL-MCNC: 8.8 MG/DL (ref 8.5–10.1)
CHLORIDE SERPL-SCNC: 107 MMOL/L (ref 97–108)
CO2 SERPL-SCNC: 27 MMOL/L (ref 21–32)
CREAT SERPL-MCNC: 1.06 MG/DL (ref 0.7–1.3)
ERYTHROCYTE [DISTWIDTH] IN BLOOD BY AUTOMATED COUNT: 12.7 % (ref 11.5–14.5)
GLOBULIN SER CALC-MCNC: 3.1 G/DL (ref 2–4)
GLUCOSE BLD STRIP.AUTO-MCNC: 134 MG/DL (ref 65–100)
GLUCOSE BLD STRIP.AUTO-MCNC: 141 MG/DL (ref 65–100)
GLUCOSE SERPL-MCNC: 169 MG/DL (ref 65–100)
HCT VFR BLD AUTO: 35.8 % (ref 36.6–50.3)
HGB BLD-MCNC: 11.8 G/DL (ref 12.1–17)
INR PPP: 1.2 (ref 0.9–1.1)
MAGNESIUM SERPL-MCNC: 2.5 MG/DL (ref 1.6–2.4)
MCH RBC QN AUTO: 30.7 PG (ref 26–34)
MCHC RBC AUTO-ENTMCNC: 33 G/DL (ref 30–36.5)
MCV RBC AUTO: 93.2 FL (ref 80–99)
NRBC # BLD: 0 K/UL (ref 0–0.01)
NRBC BLD-RTO: 0 PER 100 WBC
PLATELET # BLD AUTO: 202 K/UL (ref 150–400)
PMV BLD AUTO: 10.6 FL (ref 8.9–12.9)
POTASSIUM SERPL-SCNC: 4.3 MMOL/L (ref 3.5–5.1)
PROT SERPL-MCNC: 6.2 G/DL (ref 6.4–8.2)
PROTHROMBIN TIME: 12.4 SEC (ref 9–11.1)
RBC # BLD AUTO: 3.84 M/UL (ref 4.1–5.7)
SERVICE CMNT-IMP: ABNORMAL
SERVICE CMNT-IMP: ABNORMAL
SODIUM SERPL-SCNC: 140 MMOL/L (ref 136–145)
THERAPEUTIC RANGE,PTTT: ABNORMAL SECS (ref 58–77)
WBC # BLD AUTO: 21.1 K/UL (ref 4.1–11.1)

## 2019-05-17 PROCEDURE — 80053 COMPREHEN METABOLIC PANEL: CPT

## 2019-05-17 PROCEDURE — 74011636637 HC RX REV CODE- 636/637: Performed by: PHYSICIAN ASSISTANT

## 2019-05-17 PROCEDURE — 74011000250 HC RX REV CODE- 250: Performed by: PHYSICIAN ASSISTANT

## 2019-05-17 PROCEDURE — 65660000001 HC RM ICU INTERMED STEPDOWN

## 2019-05-17 PROCEDURE — 85730 THROMBOPLASTIN TIME PARTIAL: CPT

## 2019-05-17 PROCEDURE — 82962 GLUCOSE BLOOD TEST: CPT

## 2019-05-17 PROCEDURE — 71045 X-RAY EXAM CHEST 1 VIEW: CPT

## 2019-05-17 PROCEDURE — 74011250637 HC RX REV CODE- 250/637: Performed by: PHYSICIAN ASSISTANT

## 2019-05-17 PROCEDURE — 97116 GAIT TRAINING THERAPY: CPT

## 2019-05-17 PROCEDURE — 85027 COMPLETE CBC AUTOMATED: CPT

## 2019-05-17 PROCEDURE — 97535 SELF CARE MNGMENT TRAINING: CPT

## 2019-05-17 PROCEDURE — 94640 AIRWAY INHALATION TREATMENT: CPT

## 2019-05-17 PROCEDURE — 83735 ASSAY OF MAGNESIUM: CPT

## 2019-05-17 PROCEDURE — 36415 COLL VENOUS BLD VENIPUNCTURE: CPT

## 2019-05-17 PROCEDURE — 85610 PROTHROMBIN TIME: CPT

## 2019-05-17 PROCEDURE — 97530 THERAPEUTIC ACTIVITIES: CPT

## 2019-05-17 RX ORDER — METOPROLOL TARTRATE 25 MG/1
12.5 TABLET, FILM COATED ORAL EVERY 12 HOURS
Status: DISCONTINUED | OUTPATIENT
Start: 2019-05-17 | End: 2019-05-20 | Stop reason: HOSPADM

## 2019-05-17 RX ADMIN — BUDESONIDE 500 MCG: 0.5 INHALANT RESPIRATORY (INHALATION) at 07:26

## 2019-05-17 RX ADMIN — BENZONATATE 100 MG: 100 CAPSULE ORAL at 08:23

## 2019-05-17 RX ADMIN — MONTELUKAST SODIUM 10 MG: 10 TABLET, FILM COATED ORAL at 22:15

## 2019-05-17 RX ADMIN — FAMOTIDINE 20 MG: 20 TABLET ORAL at 22:14

## 2019-05-17 RX ADMIN — Medication 10 ML: at 16:43

## 2019-05-17 RX ADMIN — FERROUS SULFATE TAB 325 MG (65 MG ELEMENTAL FE) 325 MG: 325 (65 FE) TAB at 08:23

## 2019-05-17 RX ADMIN — ATORVASTATIN CALCIUM 40 MG: 40 TABLET, FILM COATED ORAL at 08:23

## 2019-05-17 RX ADMIN — BENZONATATE 100 MG: 100 CAPSULE ORAL at 16:42

## 2019-05-17 RX ADMIN — AMIODARONE HYDROCHLORIDE 400 MG: 200 TABLET ORAL at 08:23

## 2019-05-17 RX ADMIN — Medication 10 ML: at 22:15

## 2019-05-17 RX ADMIN — FAMOTIDINE 20 MG: 20 TABLET ORAL at 08:23

## 2019-05-17 RX ADMIN — INSULIN LISPRO 2 UNITS: 100 INJECTION, SOLUTION INTRAVENOUS; SUBCUTANEOUS at 07:28

## 2019-05-17 RX ADMIN — SENNOSIDES, DOCUSATE SODIUM 1 TABLET: 50; 8.6 TABLET, FILM COATED ORAL at 18:57

## 2019-05-17 RX ADMIN — FINASTERIDE 5 MG: 5 TABLET, FILM COATED ORAL at 08:32

## 2019-05-17 RX ADMIN — METOPROLOL TARTRATE 12.5 MG: 25 TABLET ORAL at 12:32

## 2019-05-17 RX ADMIN — POLYETHYLENE GLYCOL 3350 17 G: 17 POWDER, FOR SOLUTION ORAL at 08:22

## 2019-05-17 RX ADMIN — MAGNESIUM OXIDE TAB 400 MG (241.3 MG ELEMENTAL MG) 400 MG: 400 (241.3 MG) TAB at 08:23

## 2019-05-17 RX ADMIN — AZELASTINE HYDROCHLORIDE 2 SPRAY: 137 SPRAY, METERED NASAL at 22:18

## 2019-05-17 RX ADMIN — PREDNISONE 20 MG: 20 TABLET ORAL at 12:32

## 2019-05-17 RX ADMIN — ASPIRIN 81 MG 81 MG: 81 TABLET ORAL at 08:23

## 2019-05-17 RX ADMIN — FLUTICASONE PROPIONATE 2 SPRAY: 50 SPRAY, METERED NASAL at 16:42

## 2019-05-17 RX ADMIN — AZELASTINE HYDROCHLORIDE 2 SPRAY: 137 SPRAY, METERED NASAL at 16:42

## 2019-05-17 RX ADMIN — METOPROLOL TARTRATE 12.5 MG: 25 TABLET ORAL at 22:15

## 2019-05-17 RX ADMIN — GUAIFENESIN 600 MG: 600 TABLET, EXTENDED RELEASE ORAL at 08:22

## 2019-05-17 RX ADMIN — SENNOSIDES, DOCUSATE SODIUM 1 TABLET: 50; 8.6 TABLET, FILM COATED ORAL at 08:23

## 2019-05-17 RX ADMIN — TAMSULOSIN HYDROCHLORIDE 0.8 MG: 0.4 CAPSULE ORAL at 08:23

## 2019-05-17 RX ADMIN — CETIRIZINE HYDROCHLORIDE 10 MG: 10 TABLET, FILM COATED ORAL at 08:23

## 2019-05-17 RX ADMIN — OXYCODONE AND ACETAMINOPHEN 1 TABLET: 5; 325 TABLET ORAL at 13:56

## 2019-05-17 RX ADMIN — GUAIFENESIN 600 MG: 600 TABLET, EXTENDED RELEASE ORAL at 22:14

## 2019-05-17 RX ADMIN — ARFORMOTEROL TARTRATE 15 MCG: 15 SOLUTION RESPIRATORY (INHALATION) at 07:26

## 2019-05-17 RX ADMIN — Medication 10 ML: at 07:28

## 2019-05-17 RX ADMIN — Medication 10 ML: at 07:29

## 2019-05-17 RX ADMIN — BENZONATATE 100 MG: 100 CAPSULE ORAL at 22:14

## 2019-05-17 RX ADMIN — MUPIROCIN: 20 OINTMENT TOPICAL at 08:27

## 2019-05-17 RX ADMIN — CHLORHEXIDINE GLUCONATE 10 ML: 1.2 RINSE ORAL at 08:27

## 2019-05-17 RX ADMIN — AMIODARONE HYDROCHLORIDE 400 MG: 200 TABLET ORAL at 22:15

## 2019-05-17 NOTE — PROGRESS NOTES
Bedside and Verbal shift change report given to Daylin Carlton (oncoming nurse) by Maria Antonia Auguste (offgoing nurse). Report included the following information SBAR, Kardex, OR Summary, Procedure Summary, Intake/Output, MAR, Accordion, Recent Results, Med Rec Status and Cardiac Rhythm NSR . Problem: Falls - Risk of 
Goal: *Absence of Falls Description Document Makenzie Kayden Fall Risk and appropriate interventions in the flowsheet. Outcome: Progressing Towards Goal 
  
Problem: CABG: Post-Op Day 3 Goal: Activity/Safety Outcome: Progressing Towards Goal 
Goal: Discharge Planning Outcome: Progressing Towards Goal 
Goal: Treatments/Interventions/Procedures Outcome: Progressing Towards Goal. Removal of chest tubes planned for later this morning. Education of process. 5061 Education on patient with Incentive spirometry and sternal precautions. Patient able to verbalize both. 1030 Chest tubes and Wires removed. Patient in bed for one hour. 1902 [facility] [address] to Wife regarding patient and updating on night and morning events. 1300 Patient coughing. Instructed several times to utilize bear to cough for support. Patient encourage to use incentive spirometry 8-10 x hours. However, patient stated that he is only using \"4-6x hour\". 1930 Bedside and Verbal shift change report given to Crystal(oncoming nurse) by Daylin Carlton (offgoing nurse). Report included the following information SBAR, Kardex, OR Summary, Procedure Summary, Intake/Output, MAR, Accordion, Recent Results, Med Rec Status and Cardiac Rhythm NSR .

## 2019-05-17 NOTE — PROGRESS NOTES
[facility] Progress Note Admit Date: 2019 POD:  3 Day Post-Op Procedure:  Procedure(s): 
CABG x 3 WITH CPB; DONOR SITES: LIMA AND RIGHT SAPHENOUS VEIN VIA EVH; CÉSAR AND EPI AORTIC BY DR. Michael Hernández. Subjective:  
Pt seen with Dr. Romina Harman. Got confused overnight, was found in bathroom unsupervised. Received Xanax, Benadryl, mucinex, and melatonin last night prior to bed. Chronic cough, slightly improving. On RA, 97%. NSR 70s. -BM, +flatus Objective:  
Vitals: 
Blood pressure 139/44, pulse 74, temperature 97.7 °F (36.5 °C), resp. rate 18, height 5' 7\" (1.702 m), weight 164 lb 0.4 oz (74.4 kg), SpO2 94 %. Temp (24hrs), Av °F (36.7 °C), Min:97.3 °F (36.3 °C), Max:98.3 °F (36.8 °C) EKG/Rhythm: NSR 70s CT Output: 150 (90 in 12 hrs) Oxygen Therapy: RA 
 
CXR: 
CXR Results  (Last 48 hours) 19 0500  XR CHEST PORT Final result Impression:  IMPRESSION:   
Small bilateral pleural effusions. Narrative:  PORTABLE CHEST RADIOGRAPH/S: 2019 5:00 AM  
   
INDICATION: Postop heart. COMPARISON: 2019, 5/15/2019, 2019, 5/10/2019, 2013. TECHNIQUE: Portable frontal upright radiograph/s of the chest.  
   
FINDINGS:   
Small bilateral pleural effusions are associated with passive atelectasis. The  
lungs are clear. The central airways are patent. No pneumothorax. A mediastinal  
drain and left pleural drains are in place. Post CABG.   
   
  
 19 0429  XR CHEST PORT Final result Impression:  IMPRESSION: No significant change. Narrative:  INDICATION:  postop heart, CABG x3 2019. EXAM: CXR Portable. FINDINGS: Portable chest shows support lines/devices appear unchanged since  
yesterday. There is no apparent pneumothorax. Lungs show no acute findings. Heart size is stable. There is no overt pulmonary edema. Admission Weight: Last Weight Weight: 161 lb (73 kg) Weight: 164 lb 0.4 oz (74.4 kg) Intake / Output / Drain: 
Current Shift: No intake/output data recorded. Last 24 hrs.:  
 
Intake/Output Summary (Last 24 hours) at 2019 1661 Last data filed at 2019 0405 Gross per 24 hour Intake 632.42 ml Output 430 ml Net 202.42 ml EXAM: 
General:  NAD Lungs:   Occasional rhonchi bilaterally. Incision:  Mid sternal incision clean, dry, intact Heart:  Regular rate and rhythm, S1, S2 normal, no murmur, click, rub or gallop. Abdomen:   Soft, non-tender. Bowel sounds normal. No masses,  No organomegaly. Extremities:  No edema. PPP. Neurologic:  Gross motor and sensory apparatus intact. Labs:  
Recent Labs 19 
0622 19 
0422 WBC  --  21.1* HGB  --  11.8* HCT  --  35.8* PLT  --  202 NA  --  140 K  --  4.3 BUN  --  21* CREA  --  1.06  
GLU  --  169* GLUCPOC 141*  --   
INR  --  1.2* Assessment:  
 
Active Problems: 
  CAD (coronary artery disease) (2019) S/P CABG x 3 (2019) Overview: CABG X 3 LIMA to LAD, RSVG to RAMUS, RSVG to RCA 
    RIGHT ENDOSCOPIC [address] 55 Plan/Recommendations/Medical Decision Makin.  CAD s/p CABG, EF 65%: ASA/Statin, start BB today. 2. Dysphagia/Weight loss/new onset GERD/? Pancreatic mass: MRI negative for pancreatic mass. UGI Series shows small hiatal hernia & no other acute findings. GI recommends outpatient follow up & has signed off. Has been able to swallow foods post-operatively. 3. Hx of R carotid endarectomy: stable 4. Hx of PVD,(L SFA BAP 2007, R SFA atherectomy 3/2018): stable 5. COPD:  PRN duo-nebs, steroid nebs. Zrytec, Flonase, nasal azelastine Singulair. Pulmonary following. Chest CT negative, last dose Prednisone 20mg today.   
 
6. HLD:  Cont statin.   
 
 7. BPH:  cont flomax/proscar. Making urine on own. 8. SR w/ 1st deg block: Sinus atiya post op. NSR in 70s now. Start low dose BB today 9. Anticipated acute post op resp insufficiency: wean NC for O2 >90%, IS, activity as tolerated. Pulmonary toileting. Will d/c CT today. CXR PA/lat ordered for tomorrow. 10. Leukocytosis: Likely related to post op state/steroids. Improving. CXR clear. Monitor. 11. Confusion: Improving. Likely medication related, hold Xanax. Monitor. 12. Constipation: +flatus, active bowel sounds. D/c Fe/Mag. PRN Dulcolax, Miralax, senna. Activity as tolerated. Dispo: PT/OT. D/c chest tubes today, CXR PA/lat tomorrow. Continue IS/activity as tolerated. Possible d/c over weekend pending CXR/confusion. Patient found on multiple occasions not following sternal precautions/bracing prior to coughing. Continue sternal precaution education.  
 
Signed By: WAGNER Taveras

## 2019-05-17 NOTE — PROGRESS NOTES
1930 Bedside shift change report given to Sailaja Chavez, RN and Johnson Christie RN (oncoming nurse) by Annika Nicholas RN (offgoing nurse). Report included the following information SBAR, Kardex, Intake/Output, MAR and Recent Results. 0100 Pt exited bed on own and found in bathroom. Pt admittedly confused. Bed alarm placed. Pt also using arms to push and pull. RN reeducated pt on sternal precautions. 0730 Bedside shift change report given to Spring Mei (oncoming nurse) by Sailaja Chavez, RN and Johnson Christie RN (offgoing nurse). Report included the following information SBAR, Kardex, Intake/Output, MAR and Recent Results.

## 2019-05-17 NOTE — PROGRESS NOTES
Problem: Mobility Impaired (Adult and Pediatric) Goal: *Acute Goals and Plan of Care (Insert Text) Description Physical Therapy Goals Initiated 5/15/2019 1. Patient will move from supine to sit and sit to supine, scoot up and down and roll side to side in bed with modified independence within 5 days. 2.  Patient will perform sit to/from stand with supervision/set-up within 5 days. 3.  Patient will ambulate 150 feet with least restrictive assistive device and supervision/set-up within 5 days. 4.  Patient will ascend/descend 6 stairs with handrail(s), per home set-up, with supervision/set-up within 5 days. 5.  Patient will perform cardiac exercises per protocol with independence within 5 days. 6.  Patient will verbally and functionally recall 3/3 sternal precautions within 5 days. Outcome: Progressing Towards Goal 
 
PHYSICAL THERAPY TREATMENT Patient: Hannah Bowen (2 y.o. male) Date: 5/17/2019 Diagnosis: Nonspecific abnormal function study, cardiovascular [R94.30] CAD (coronary artery disease) [I25.10] CAD (coronary artery disease) [I25.10] <principal problem not specified> Procedure(s) (LRB): 
CABG x 3 WITH CPB; DONOR SITES: LIMA AND RIGHT SAPHENOUS VEIN VIA EVH; CÉSAR AND EPI AORTIC BY DR. Lucio Aguayo. (N/A) 3 Days Post-Op Precautions: Fall, Sternal, Bed/Chair Alarm Chart, physical therapy assessment, plan of care and goals were reviewed. ASSESSMENT: 
Patient remains anxious. Patient recalling 2/3 sternal precautions, however, illustrating poor compliance with functional tasks requiring maximal verbal cuing. Reviewed bed mobility technique (log roll; requiring moderate assist for trunk righting from sidelying) and seated scooting technique for anterior pelvic advancement.  Patient progressed his gait tolerance to 150 ft with overall contact guard level of assistance (x 1-2 lateral LOB episodes with delayed stepping strategies requiring light minimal assist to correct). Continue to note high guard posture with scapular protraction and downward gaze - maximal verbal cuing for correction and sustained maintenance. Need to initiate stair training tomorrow. Anticipate need for 2-3 more acute PT sessions prior to discharge home with HHPT. Of note, patient with a productive cough and poor chest splinting (maximal verbal cuing for this). Progression toward goals: 
?    Improving appropriately and progressing toward goals ? Improving slowly and progressing toward goals ? Not making progress toward goals and plan of care will be adjusted PLAN: 
Patient continues to benefit from skilled intervention to address the above impairments. Continue treatment per established plan of care. Discharge Recommendations:  Home Health Further Equipment Recommendations for Discharge:  None SUBJECTIVE:  
Patient stated ? I can't sleep at night because of my cough. ? OBJECTIVE DATA SUMMARY:  
Critical Behavior: 
Neurologic State: Alert Orientation Level: Oriented X4 Cognition: Appropriate for age attention/concentration, Follows commands, Impulsive, Poor safety awareness Safety/Judgement: Awareness of environment, Decreased awareness of need for assistance, Decreased awareness of need for safety, Decreased insight into deficits Functional Mobility Training: 
Bed Mobility: 
Supine to Sit: Minimum assistance(heavy Min A for trunk righting) Sit to Supine: (up in chair) Scooting: Contact guard assistance; Additional time(cues for not pushing with hands) Transfers: 
Sit to Stand: Contact guard assistance(cues for sternal precautions) Stand to Sit: Contact guard assistance(cues for sternal precaution) Bed to Chair: Minimum assistance(for steadying upon backing up to the chair) Balance: 
Sitting: Intact Standing: Impaired; Without support Standing - Static: Good Standing - Dynamic : Fair Ambulation/Gait Training: Distance (ft): 150 Feet (ft) Assistive Device: Gait belt Ambulation - Level of Assistance: Minimal assistance;Contact guard assistance Gait Abnormalities: Altered arm swing;Decreased step clearance; Path deviations(Cervical flexion and scapular protraction: Max cues to corre) Base of Support: Narrowed; Center of gravity altered Speed/Portia: Slow Pain: 
Pain Scale 1: Numeric (0 - 10) Pain Intensity 1: 0 Activity Tolerance: 
Please refer to the flowsheet for vital signs taken during this treatment. After treatment:  
?    Patient left in no apparent distress sitting up in chair ? Patient left in no apparent distress in bed 
? Call bell left within reach ? Nursing notified ? Caregiver present ? Bed alarm activated COMMUNICATION/COLLABORATION:  
The patient?s plan of care was discussed with: Occupational Therapist, Registered Nurse and  Annette Fraire PT, DPT Time Calculation: 37 mins

## 2019-05-17 NOTE — CARDIO/PULMONARY
Cardiac Rehab: CABG education folder at bedside. Met with Almarie Holstein and his wife and son to review cardiac surgery post discharge instructions and to discuss participation in the Cardiac Rehab Program.  
 
Educated using teach back method. Reviewed the use of bear for sternal support, daily weight and temperature monitoring, showering restrictions, signs and symptoms of infection at surgery sites, daily walking and arm exercises, and use of incentive spirometer. He quit smoking on admission. Smoking Cessation Program link added to AVS. Pt and family given much reinforcement on importance and use of incentive spirometer and 5 lb wt lifting restriction. Discussed Heart Healthy/Low Sodium (2000 mg.) diet. Gave red reminder bracelet. Discussed purpose of bracelet, duration of wear, and when to call surgeons office. Discussed Cardiac Rehab Program format, benefits, and encouraged participation. Initial Cardiac Rehab Program intake appointment scheduled for 6/13/2019 and appointment information is on the AVS. General questions answered. Almarie Holstein and family verbalized understanding.     
 
Will continue to follow for educational needs and enrollment in the Cardiac Rehab Program. Jeferson Rodriguez RN

## 2019-05-17 NOTE — PROGRESS NOTES
CT x 3 cleaned and removed without difficulty. Patient tolerated well. Dressing placed. CXR PA/Lat ordered for tomorrow. Additionally, temporary AV pacing wires cleaned and removed without difficulty. Patient remained in NSR 70s. Bed rest x 1h, patient and RN aware. Patient denies any new chest pain, SOB, nausea, dizziness, or pre-syncope.

## 2019-05-17 NOTE — PROGRESS NOTES
The CM called and left a voicemail message with Joycelyn Luevano with [facility] to provide update- orders are in for home health skilled nursing, [facility] has accepted patient. PT/OT following patient, will await any therapy recommendations. MICHELLE Umanzor CM spoke with Ernestina Dixon with [facility]- aware of anticipated discharge this weekend. Patient has been authorized for home health skilled nursing, CM notified Ernestina [facility] of possible therapy orders being added. CM will continue to follow.  MICHELLE Umanzor

## 2019-05-17 NOTE — PROGRESS NOTES
Problem: Self Care Deficits Care Plan (Adult) Goal: *Acute Goals and Plan of Care (Insert Text) Description Occupational Therapy Goals Initiated 5/15/2019 1. Patient will perform ADLs standing 5 mins without fatigue or LOB with supervision/set-up within 7 day(s). 2.  Patient will perform lower body ADLs with supervision/set-up within 7 day(s). 3.  Patient will perform gathering ADL items high and low 2/2 with supervision/set-up within 7 day(s). 4.  Patient will perform toilet transfers with supervision/set-up within 7 day(s). 5.  Patient will perform all aspects of toileting with supervision/set-up within 7 day(s). 6.  Patient will participate in cardiac/sternal upper extremity therapeutic exercise/activities to increase independence with ADLs with supervision/set-up for 5 minutes within 7 day(s). Outcome: Progressing Towards Goal 
  
OCCUPATIONAL THERAPY TREATMENT Patient: Baldo Richardson (75 y.o. male) Date: 5/17/2019 Diagnosis: Nonspecific abnormal function study, cardiovascular [R94.30] CAD (coronary artery disease) [I25.10] CAD (coronary artery disease) [I25.10] <principal problem not specified> Procedure(s) (LRB): 
CABG x 3 WITH CPB; DONOR SITES: LIMA AND RIGHT SAPHENOUS VEIN VIA EVH; CÉSAR AND EPI AORTIC BY DR. Kalani Redmond. (N/A) 3 Days Post-Op Precautions: Fall, Sternal 
Chart, occupational therapy assessment, plan of care, and goals were reviewed. ASSESSMENT: 
Patient progressing with ADLs and functional mobility, however continues to require MAXIMAL multimodal cues for safety & sternal precautions maintenance, especially splinting chest with coughing. Completed upper & lower body dressing with SPV-CGA & cues for technique, especially with BUE overhead reach. Educated on adaptive techniques for ADLs, sleep hygiene, and home safety, and encouraged OOB mobility with staff assist & BUE cardiac exercises, patient & wife acknowledging understanding with handout provided. Per chart, plans for d/c over the weekend, however would benefit from at least 1 more OT session and HHOT/PT upon d/c. Recommend with nursing patient to complete as able in order to maintain strength, endurance and independence: ADLs with supervision/setup, OOB to chair 3x/day and mobilizing to the bathroom for toileting with 1 assist. Thank you for your assistance. Progression toward goals: 
?       Improving appropriately and progressing toward goals ? Improving slowly and progressing toward goals ? Not making progress toward goals and plan of care will be adjusted PLAN: 
Patient continues to benefit from skilled intervention to address the above impairments. Continue treatment per established plan of care. Discharge Recommendations:  Home Health OT & family support/SPV Further Equipment Recommendations for Discharge:  Shower chair (wife to borrow from a family friend) SUBJECTIVE:  
Patient stated Just stop, be quiet.  patient stating when wife reinforcing sternal precautions The patient stated 2/3 sternal precautions. Reviewed all 3 with patient. OBJECTIVE DATA SUMMARY:  
Cognitive/Behavioral Status: 
Neurologic State: Alert Orientation Level: Oriented X4 Cognition: Appropriate for age attention/concentration; Follows commands; Impulsive;Poor safety awareness Perception: Appears intact Perseveration: No perseveration noted Safety/Judgement: Awareness of environment;Decreased awareness of need for assistance;Decreased awareness of need for safety;Decreased insight into deficits Functional Mobility and Transfers for ADLs: 
Bed Mobility: 
Supine to Sit: Minimum assistance(heavy Min A for trunk righting) Sit to Supine: (up in chair) Scooting: Contact guard assistance; Additional time(cues for not pushing with hands) Transfers: 
Sit to Stand: Contact guard assistance(cues for sternal precautions)  Bed to Chair: Minimum assistance(for steadying upon backing up to the chair) Balance: 
Sitting: Intact Standing: Impaired; Without support Standing - Static: Good Standing - Dynamic : Fair ADL Intervention: Lower Body Bathing Lower Body : Compensatory technique training Cues: Verbal cues provided;Visual cues provided Upper Body Dressing Assistance Pullover Shirt: Minimum assistance; Compensatory technique training(cues for BUE overhead reach) Front Opened Shirt: Compensatory technique training Cues: Visual cues provided;Verbal cues provided;Physical assistance; Tactile cues provided Lower Body Dressing Assistance Dressing Assistance: Contact guard assistance(for standing balance, cues for pulling & positioning) Underpants: Compensatory technique training Pants With Elastic Waist: Contact guard assistance; Compensatory technique training Pants With Button/Zipper: Compensatory technique training Socks: Compensatory technique training Leg Crossed Method Used: Yes Position Performed: Seated edge of bed Cues: Verbal cues provided; Tactile cues provided Cognitive Retraining Safety/Judgement: Awareness of environment;Decreased awareness of need for assistance;Decreased awareness of need for safety;Decreased insight into deficits Patient instructed no asymmetrical reaching over head to ensure B UEs when shoulders >90* i.e. reaching in cabinets and dressing. Instruction on upper body dressing techniques of over head, then arms through to decrease pain and unilateral shoulder flexion >90*. Instruction on the benefits of utilizing B UEs during functional tasks i.e. opening the fridge, stepping into the tub. Instruction if continued pain at home with shoulder IR for BM hygiene can use wet wipes and toilet tongs PRN. Avoid valsalva maneuvers.  
May have to adjust home setup to increase ease with items closer to waist height to prevent deep bending and the automatic  of asymmetrical UE WB/pushing for stabilization during bending. Benefit to don clothing tailor sitting and don all clothing while sitting prior to standing. Patient demonstrated lower body dressing with CGA. Increase activity tolerance for home, work, and sexual intercourse by pacing self with increasing the arm exercises, sitting duration, frequency OOB, walking, standing, and ADLs. Instructed and indicated understanding of s/s of too much activity, how to respond to s/s safely. Therapeutic Exercises:  
Patient instructed on the benefits of cardiac exercises, exercises left open in booklet with patient & wife . Instructed and indicated understanding on how to progress reps, sets against gravity, working up to 5 lbs, standing and so on based on surgeon clearance for more weight in prep for basic and instrumental ADLs. Instruction on the use of household items in place of weights as needed. Pain: 
Pain Scale 1: Numeric (0 - 10) Pain Intensity 1: 0 Activity Tolerance:  
Good Please refer to the flowsheet for vital signs taken during this treatment. After treatment:  
? Patient left in no apparent distress sitting up in chair ? Patient left in no apparent distress in bed 
? Call bell left within reach ? Nursing notified ? Caregiver present ? Bed alarm activated COMMUNICATION/COLLABORATION:  
The patients plan of care was discussed with: Physical Therapist and Registered Nurse KAREN Ware, OTR/L Time Calculation: 48 mins

## 2019-05-18 ENCOUNTER — APPOINTMENT (OUTPATIENT)
Dept: GENERAL RADIOLOGY | Age: 74
DRG: 234 | End: 2019-05-18
Attending: PHYSICIAN ASSISTANT
Payer: MEDICARE

## 2019-05-18 LAB
ALBUMIN SERPL-MCNC: 2.7 G/DL (ref 3.5–5)
ALBUMIN/GLOB SERPL: 0.9 {RATIO} (ref 1.1–2.2)
ALP SERPL-CCNC: 72 U/L (ref 45–117)
ALT SERPL-CCNC: 11 U/L (ref 12–78)
ANION GAP SERPL CALC-SCNC: 6 MMOL/L (ref 5–15)
AST SERPL-CCNC: 19 U/L (ref 15–37)
BILIRUB SERPL-MCNC: 0.5 MG/DL (ref 0.2–1)
BUN SERPL-MCNC: 20 MG/DL (ref 6–20)
BUN/CREAT SERPL: 24 (ref 12–20)
CALCIUM SERPL-MCNC: 8.6 MG/DL (ref 8.5–10.1)
CHLORIDE SERPL-SCNC: 109 MMOL/L (ref 97–108)
CO2 SERPL-SCNC: 26 MMOL/L (ref 21–32)
CREAT SERPL-MCNC: 0.84 MG/DL (ref 0.7–1.3)
ERYTHROCYTE [DISTWIDTH] IN BLOOD BY AUTOMATED COUNT: 13.1 % (ref 11.5–14.5)
GLOBULIN SER CALC-MCNC: 3.1 G/DL (ref 2–4)
GLUCOSE SERPL-MCNC: 93 MG/DL (ref 65–100)
HCT VFR BLD AUTO: 33.3 % (ref 36.6–50.3)
HGB BLD-MCNC: 10.8 G/DL (ref 12.1–17)
MCH RBC QN AUTO: 30.7 PG (ref 26–34)
MCHC RBC AUTO-ENTMCNC: 32.4 G/DL (ref 30–36.5)
MCV RBC AUTO: 94.6 FL (ref 80–99)
NRBC # BLD: 0 K/UL (ref 0–0.01)
NRBC BLD-RTO: 0 PER 100 WBC
PLATELET # BLD AUTO: 239 K/UL (ref 150–400)
PMV BLD AUTO: 10.6 FL (ref 8.9–12.9)
POTASSIUM SERPL-SCNC: 4.5 MMOL/L (ref 3.5–5.1)
PROT SERPL-MCNC: 5.8 G/DL (ref 6.4–8.2)
RBC # BLD AUTO: 3.52 M/UL (ref 4.1–5.7)
SODIUM SERPL-SCNC: 141 MMOL/L (ref 136–145)
WBC # BLD AUTO: 16.6 K/UL (ref 4.1–11.1)

## 2019-05-18 PROCEDURE — 80053 COMPREHEN METABOLIC PANEL: CPT

## 2019-05-18 PROCEDURE — 71046 X-RAY EXAM CHEST 2 VIEWS: CPT

## 2019-05-18 PROCEDURE — 74011250637 HC RX REV CODE- 250/637: Performed by: PHYSICIAN ASSISTANT

## 2019-05-18 PROCEDURE — 74011000250 HC RX REV CODE- 250: Performed by: PHYSICIAN ASSISTANT

## 2019-05-18 PROCEDURE — 94640 AIRWAY INHALATION TREATMENT: CPT

## 2019-05-18 PROCEDURE — 85027 COMPLETE CBC AUTOMATED: CPT

## 2019-05-18 PROCEDURE — 97116 GAIT TRAINING THERAPY: CPT

## 2019-05-18 PROCEDURE — 65660000001 HC RM ICU INTERMED STEPDOWN

## 2019-05-18 PROCEDURE — 94760 N-INVAS EAR/PLS OXIMETRY 1: CPT

## 2019-05-18 PROCEDURE — 36415 COLL VENOUS BLD VENIPUNCTURE: CPT

## 2019-05-18 RX ORDER — GUAIFENESIN 600 MG/1
1200 TABLET, EXTENDED RELEASE ORAL EVERY 12 HOURS
Status: DISCONTINUED | OUTPATIENT
Start: 2019-05-18 | End: 2019-05-20 | Stop reason: HOSPADM

## 2019-05-18 RX ADMIN — AZELASTINE HYDROCHLORIDE 2 SPRAY: 137 SPRAY, METERED NASAL at 18:09

## 2019-05-18 RX ADMIN — Medication 10 ML: at 06:56

## 2019-05-18 RX ADMIN — BENZONATATE 100 MG: 100 CAPSULE ORAL at 08:23

## 2019-05-18 RX ADMIN — FAMOTIDINE 20 MG: 20 TABLET ORAL at 08:23

## 2019-05-18 RX ADMIN — ASPIRIN 81 MG 81 MG: 81 TABLET ORAL at 08:23

## 2019-05-18 RX ADMIN — OXYCODONE AND ACETAMINOPHEN 1 TABLET: 5; 325 TABLET ORAL at 01:35

## 2019-05-18 RX ADMIN — METOPROLOL TARTRATE 12.5 MG: 25 TABLET ORAL at 08:23

## 2019-05-18 RX ADMIN — FAMOTIDINE 20 MG: 20 TABLET ORAL at 20:44

## 2019-05-18 RX ADMIN — MONTELUKAST SODIUM 10 MG: 10 TABLET, FILM COATED ORAL at 20:44

## 2019-05-18 RX ADMIN — CHLORHEXIDINE GLUCONATE 10 ML: 1.2 RINSE ORAL at 08:30

## 2019-05-18 RX ADMIN — ARFORMOTEROL TARTRATE 15 MCG: 15 SOLUTION RESPIRATORY (INHALATION) at 19:42

## 2019-05-18 RX ADMIN — GUAIFENESIN 1200 MG: 600 TABLET, EXTENDED RELEASE ORAL at 20:44

## 2019-05-18 RX ADMIN — Medication 10 ML: at 20:47

## 2019-05-18 RX ADMIN — Medication 10 ML: at 18:07

## 2019-05-18 RX ADMIN — FLUTICASONE PROPIONATE 2 SPRAY: 50 SPRAY, METERED NASAL at 08:24

## 2019-05-18 RX ADMIN — FINASTERIDE 5 MG: 5 TABLET, FILM COATED ORAL at 08:32

## 2019-05-18 RX ADMIN — AMIODARONE HYDROCHLORIDE 400 MG: 200 TABLET ORAL at 20:44

## 2019-05-18 RX ADMIN — TAMSULOSIN HYDROCHLORIDE 0.8 MG: 0.4 CAPSULE ORAL at 08:23

## 2019-05-18 RX ADMIN — MUPIROCIN: 20 OINTMENT TOPICAL at 08:30

## 2019-05-18 RX ADMIN — ATORVASTATIN CALCIUM 40 MG: 40 TABLET, FILM COATED ORAL at 08:23

## 2019-05-18 RX ADMIN — AMIODARONE HYDROCHLORIDE 400 MG: 200 TABLET ORAL at 08:23

## 2019-05-18 RX ADMIN — OXYCODONE AND ACETAMINOPHEN 1 TABLET: 5; 325 TABLET ORAL at 20:44

## 2019-05-18 RX ADMIN — BENZONATATE 100 MG: 100 CAPSULE ORAL at 20:51

## 2019-05-18 RX ADMIN — BENZONATATE 100 MG: 100 CAPSULE ORAL at 18:07

## 2019-05-18 RX ADMIN — SENNOSIDES, DOCUSATE SODIUM 1 TABLET: 50; 8.6 TABLET, FILM COATED ORAL at 18:07

## 2019-05-18 RX ADMIN — SENNOSIDES, DOCUSATE SODIUM 1 TABLET: 50; 8.6 TABLET, FILM COATED ORAL at 08:23

## 2019-05-18 RX ADMIN — CETIRIZINE HYDROCHLORIDE 10 MG: 10 TABLET, FILM COATED ORAL at 08:23

## 2019-05-18 RX ADMIN — AZELASTINE HYDROCHLORIDE 2 SPRAY: 137 SPRAY, METERED NASAL at 08:24

## 2019-05-18 RX ADMIN — BUDESONIDE 500 MCG: 0.5 INHALANT RESPIRATORY (INHALATION) at 19:42

## 2019-05-18 RX ADMIN — POLYETHYLENE GLYCOL 3350 17 G: 17 POWDER, FOR SOLUTION ORAL at 08:22

## 2019-05-18 RX ADMIN — MUPIROCIN: 20 OINTMENT TOPICAL at 20:45

## 2019-05-18 RX ADMIN — METOPROLOL TARTRATE 12.5 MG: 25 TABLET ORAL at 20:43

## 2019-05-18 RX ADMIN — GUAIFENESIN 1200 MG: 600 TABLET, EXTENDED RELEASE ORAL at 08:23

## 2019-05-18 NOTE — PROGRESS NOTES
Bedside shift change report given to MIN Benitez (oncoming nurse) by FAYE Clark (offgoing nurse). Report included the following information SBAR, Procedure Summary, Intake/Output, MAR and Recent Results.

## 2019-05-18 NOTE — PROGRESS NOTES
Problem: Mobility Impaired (Adult and Pediatric) Goal: *Acute Goals and Plan of Care (Insert Text) Description Physical Therapy Goals Initiated 5/15/2019 1. Patient will move from supine to sit and sit to supine, scoot up and down and roll side to side in bed with modified independence within 5 days. 2.  Patient will perform sit to/from stand with supervision/set-up within 5 days. 3.  Patient will ambulate 150 feet with least restrictive assistive device and supervision/set-up within 5 days. 4.  Patient will ascend/descend 6 stairs with handrail(s), per home set-up, with supervision/set-up within 5 days. 5.  Patient will perform cardiac exercises per protocol with independence within 5 days. 6.  Patient will verbally and functionally recall 3/3 sternal precautions within 5 days. Outcome: Progressing Towards Goal 
 PHYSICAL THERAPY TREATMENT Patient: Ashley Obrien (42 y.o. male) Date: 5/18/2019 Diagnosis: Nonspecific abnormal function study, cardiovascular [R94.30] CAD (coronary artery disease) [I25.10] CAD (coronary artery disease) [I25.10] <principal problem not specified> Procedure(s) (LRB): 
CABG x 3 WITH CPB; DONOR SITES: LIMA AND RIGHT SAPHENOUS VEIN VIA EVH; CÉSAR AND EPI AORTIC BY DR. Jc Guy. (N/A) 4 Days Post-Op Precautions: Fall, Sternal 
Chart, physical therapy assessment, plan of care and goals were reviewed. ASSESSMENT: 
Pt cleared by nsg  pt needed max encouragement to participate as therapist already came back twice to give pt more time to rest.  Pt agreeable but wanted to use bathroom first.  Pt needing extensive verbal cues and min assist to maintain sternal precautions for bed mobility and sit to stand. Pt requried min assist to CGA due to occasional loss of balance. Pt take to small steps and wife and pt given education regarding use of rails for balance ONLY- use of \"fingertips\" in order to maintain precautions.   Pt able to do 2 steps but needed to use both rails or one rail and therapists hand for balance. Pt with increased shuffling gait pattern noted and poor clearance generally. Returned to room and educated on need to be OOB in chair for at least another 30 minutes. Pt with several productive coughs during session but no SOB reported. Reviewed cardiac exercises and pt said he would \"do them later\". Pt would benefit from HHPT and HHOT due to continued decrease in balance and gait, endurance and strength once discharged. D/W nsg. Will follow. Progression toward goals: 
?      Improving appropriately and progressing toward goals ? Improving slowly and progressing toward goals ? Not making progress toward goals and plan of care will be adjusted PLAN: 
Patient continues to benefit from skilled intervention to address the above impairments. Continue treatment per established plan of care. Discharge Recommendations:  Home Health Further Equipment Recommendations for Discharge:  none SUBJECTIVE:  
Patient stated ? I cant seem to get any rest, as soon as I get comfortable someone comes in.? The patient stated 0 /3 sternal precautions. Reviewed all 3 with patient. OBJECTIVE DATA SUMMARY:  
Patient mobilized on continuous portable monitor/telemetry. Critical Behavior: 
Neurologic State: Alert, Appropriate for age Orientation Level: Oriented X4 Cognition: Appropriate decision making, Appropriate for age attention/concentration, Appropriate safety awareness, Follows commands Safety/Judgement: Awareness of environment, Decreased awareness of need for assistance, Decreased awareness of need for safety, Decreased insight into deficits Functional Mobility Training: 
Bed Mobility: 
Log   
Supine to Sit: Assist x1;Minimum assistance(and verbal cues to maintain sternal precautiosn) Scooting: Supervision Transfers: 
Sit to Stand: Assist x1;Contact guard assistance Stand to Sit: Assist x1;Contact guard assistance Balance: 
Sitting: Intact Standing: Impaired Standing - Static: Good Standing - Dynamic : Fair Ambulation/Gait Training: 
Distance (ft): 150 Feet (ft) Ambulation - Level of Assistance: Minimal assistance(occ LOB) Gait Abnormalities: Shuffling gait; Decreased step clearance; Path deviations Base of Support: Narrowed Speed/Portia: Pace decreased (<100 feet/min); Shuffled Stairs: 
  
 Up and down 2 steps with Ax1 and use of rails for balance alone Patient instructed as part of their cardiac daily wellness check to step on scale. Patient demonstrated stepping on and off scale with (Other) comment and with/without use of upper extremities for stability. Pain: 
Pain Scale 1: Numeric (0 - 10) Pain Intensity 1: 0 Activity Tolerance:  
fair Please refer to the flowsheet for vital signs taken during this treatment. After treatment:  
? Patient left in no apparent distress sitting up in chair ? Patient left in no apparent distress in bed 
? Call bell left within reach ? Nursing notified ? Caregiver present ? Bed alarm activated COMMUNICATION/COLLABORATION:  
The patient?s plan of care was discussed with: Registered Nurse Bernadine Cheek, PT Time Calculation: 25 mins

## 2019-05-18 NOTE — PROGRESS NOTES
[facility] ICU Progress Note Admit Date: 2019 POD:  4 Day Post-Op Procedure:  Procedure(s): 
CABG x 3 WITH CPB; DONOR SITES: LIMA AND RIGHT SAPHENOUS VEIN VIA EVH; CÉSAR AND EPI AORTIC BY DR. Lissette Valderrama. Subjective:  
Pt seen with Dr. Crystal Minor. In chair, off 02. Chronic cough, slightly improving. On RA, 97%. NSR 70s. -BM, +flatus Objective:  
Vitals: 
Blood pressure 126/59, pulse 76, temperature 98.4 °F (36.9 °C), resp. rate 18, height 5' 7\" (1.702 m), weight 162 lb 0.6 oz (73.5 kg), SpO2 91 %. Temp (24hrs), Av.8 °F (36.6 °C), Min:97.4 °F (36.3 °C), Max:98.4 °F (36.9 °C) EKG/Rhythm: NSR 70s Oxygen Therapy: RA 
 
CXR: 
CXR Results  (Last 48 hours) 19 0741  XR CHEST PA LAT Final result Impression:  IMPRESSION:   
Increased, small left pleural effusion. Trace right pleural effusion. Narrative:  PA AND LATERAL CHEST RADIOGRAPHS: 2019 7:41 AM  
   
INDICATION: Nonspecific abnormal function study, cardiovascular, coronary artery  
disease. Follow-up pleural effusions. COMPARISON: 2019, 2019, 5/15/2019, 2019. TECHNIQUE: Frontal and lateral radiographs of the chest.  
   
FINDINGS:  
A left chest tube has been removed, and a small left pleural effusion has  
probably increased slightly. There is a trace right pleural effusion as well. Aside from basilar atelectasis, the lungs are clear. The central airways are  
patent. No pneumothorax. Post CABG.  
   
  
 19 0500  XR CHEST PORT Final result Impression:  IMPRESSION:   
Small bilateral pleural effusions. Narrative:  PORTABLE CHEST RADIOGRAPH/S: 2019 5:00 AM  
   
INDICATION: Postop heart. COMPARISON: 2019, 5/15/2019, 2019, 5/10/2019, 2013. TECHNIQUE: Portable frontal upright radiograph/s of the chest.  
   
FINDINGS:   
Small bilateral pleural effusions are associated with passive atelectasis.  The  
 lungs are clear. The central airways are patent. No pneumothorax. A mediastinal  
drain and left pleural drains are in place. Post CABG. Admission Weight: Last Weight Weight: 161 lb (73 kg) Weight: 162 lb 0.6 oz (73.5 kg) Intake / [place] [facility] / Drain: 
Current Shift:  0701 -  1900 In: 180 [P.O.:180] Out: 200 [Urine:200] Last 24 hrs.:  
 
Intake/Output Summary (Last 24 hours) at 2019 1039 Last data filed at 2019 6613 Gross per 24 hour Intake 980 ml Output 400 ml Net 580 ml EXAM: 
General:  NAD Lungs:   Occasional rhonchi bilaterally. Incision:  Mid sternal incision clean, dry, intact Heart:  Regular rate and rhythm, S1, S2 normal, no murmur, click, rub or gallop. Abdomen:   Soft, non-tender. Bowel sounds normal. No masses,  No organomegaly. Extremities:  No edema. PPP. Neurologic:  Gross motor and sensory apparatus intact. Labs:  
Recent Labs 19 
3738 19 
1107  19 
0422 WBC 16.6*  --   --  21.1* HGB 10.8*  --   --  11.8* HCT 33.3*  --   --  35.8*   --   --  202   --   --  140  
K 4.5  --   --  4.3 BUN 20  --   --  21* CREA 0.84  --   --  1.06  
GLU 93  --   --  169* GLUCPOC  --  134*   < >  --   
INR  --   --   --  1.2*  
 < > = values in this interval not displayed. Assessment:  
 
Active Problems: 
  CAD (coronary artery disease) (2019) S/P CABG x 3 (2019) Overview: CABG X 3 LIMA to LAD, RSVG to RAMUS, RSVG to RCA 
    RIGHT ENDOSCOPIC Ventanilla De Arslan 55 Plan/Recommendations/Medical Decision Makin.  CAD s/p CABG, EF 65%: ASA/Statin, BB . 2. Dysphagia/Weight loss/new onset GERD/? Pancreatic mass: MRI negative for pancreatic mass. UGI Series shows small hiatal hernia & no other acute findings. GI recommends outpatient follow up & has signed off.  Has been able to swallow foods post-operatively. 3. Hx of R carotid endarectomy: stable 4. Hx of PVD,(L SFA BAP 1/2007, R SFA atherectomy 3/2018): stable 5. COPD:  PRN duo-nebs, steroid nebs. Zrytec, Flonase, nasal azelastine Singulair. Pulmonary following. Chest CT negative, last dose Prednisone 20mg today. 6. HLD:  Cont statin.   
 
7. BPH:  cont flomax/proscar. Making urine on own. 8. SR w/ 1st deg block: Sinus atiya post op. NSR in 70s now. Start low dose BB today 9. Anticipated acute post op resp insufficiency: wean NC for O2 >90%, IS, activity as tolerated. Pulmonary toileting. Will d/c CT today. CXR PA/lat ordered for tomorrow. 10. Leukocytosis: Likely related to post op state/steroids. Improving. CXR clear. Monitor. 11. Confusion: Improving. Likely medication related, hold Xanax. Monitor. 12. Constipation: +flatus, active bowel sounds. D/c Fe/Mag. PRN Dulcolax, Miralax, senna. Activity as tolerated. 13. Dispo: continue to increase activity. Not ready just yet for d/c home. Should be ready tomorrow. Needs to have BM and walk more.   
 
Signed By: Mandi Ramirez PA-C

## 2019-05-18 NOTE — PROGRESS NOTES
Bedside and Verbal shift change report given to [facility] (oncoming nurse) by Caty Fuentes (offgoing nurse). Report included the following information SBAR, Kardex, Procedure Summary, Intake/Output, MAR, Accordion, Recent Results, Med Rec Status and Cardiac Rhythm NSR . Problem: Patient Education: Go to Patient Education Activity Goal: Patient/Family Education Outcome: Progressing Towards Goal 
   
Problem: CABG: Post-Op Day 4 Goal: Activity/Safety Outcome: Progressing Towards Goal 
Goal: Diagnostic Test/Procedures Outcome: Progressing Towards Goal 
Goal: Discharge Planning Outcome: Progressing Towards Goal

## 2019-05-19 ENCOUNTER — APPOINTMENT (OUTPATIENT)
Dept: GENERAL RADIOLOGY | Age: 74
DRG: 234 | End: 2019-05-19
Attending: PHYSICIAN ASSISTANT
Payer: MEDICARE

## 2019-05-19 LAB
ALBUMIN SERPL-MCNC: 2.6 G/DL (ref 3.5–5)
ALBUMIN/GLOB SERPL: 0.7 {RATIO} (ref 1.1–2.2)
ALP SERPL-CCNC: 74 U/L (ref 45–117)
ALT SERPL-CCNC: 18 U/L (ref 12–78)
ANION GAP SERPL CALC-SCNC: 6 MMOL/L (ref 5–15)
AST SERPL-CCNC: 29 U/L (ref 15–37)
BILIRUB SERPL-MCNC: 0.8 MG/DL (ref 0.2–1)
BUN SERPL-MCNC: 20 MG/DL (ref 6–20)
BUN/CREAT SERPL: 20 (ref 12–20)
CALCIUM SERPL-MCNC: 8.8 MG/DL (ref 8.5–10.1)
CHLORIDE SERPL-SCNC: 106 MMOL/L (ref 97–108)
CO2 SERPL-SCNC: 29 MMOL/L (ref 21–32)
CREAT SERPL-MCNC: 1 MG/DL (ref 0.7–1.3)
ERYTHROCYTE [DISTWIDTH] IN BLOOD BY AUTOMATED COUNT: 12.9 % (ref 11.5–14.5)
GLOBULIN SER CALC-MCNC: 3.5 G/DL (ref 2–4)
GLUCOSE SERPL-MCNC: 94 MG/DL (ref 65–100)
HCT VFR BLD AUTO: 39.1 % (ref 36.6–50.3)
HGB BLD-MCNC: 12.5 G/DL (ref 12.1–17)
MCH RBC QN AUTO: 30.7 PG (ref 26–34)
MCHC RBC AUTO-ENTMCNC: 32 G/DL (ref 30–36.5)
MCV RBC AUTO: 96.1 FL (ref 80–99)
NRBC # BLD: 0 K/UL (ref 0–0.01)
NRBC BLD-RTO: 0 PER 100 WBC
PLATELET # BLD AUTO: 282 K/UL (ref 150–400)
PMV BLD AUTO: 10.2 FL (ref 8.9–12.9)
POTASSIUM SERPL-SCNC: 3.9 MMOL/L (ref 3.5–5.1)
PROT SERPL-MCNC: 6.1 G/DL (ref 6.4–8.2)
RBC # BLD AUTO: 4.07 M/UL (ref 4.1–5.7)
SODIUM SERPL-SCNC: 141 MMOL/L (ref 136–145)
WBC # BLD AUTO: 15.3 K/UL (ref 4.1–11.1)

## 2019-05-19 PROCEDURE — 74011000250 HC RX REV CODE- 250: Performed by: PHYSICIAN ASSISTANT

## 2019-05-19 PROCEDURE — 80053 COMPREHEN METABOLIC PANEL: CPT

## 2019-05-19 PROCEDURE — 74011250637 HC RX REV CODE- 250/637: Performed by: PHYSICIAN ASSISTANT

## 2019-05-19 PROCEDURE — 36415 COLL VENOUS BLD VENIPUNCTURE: CPT

## 2019-05-19 PROCEDURE — 94640 AIRWAY INHALATION TREATMENT: CPT

## 2019-05-19 PROCEDURE — 85027 COMPLETE CBC AUTOMATED: CPT

## 2019-05-19 PROCEDURE — 71046 X-RAY EXAM CHEST 2 VIEWS: CPT

## 2019-05-19 PROCEDURE — 97116 GAIT TRAINING THERAPY: CPT

## 2019-05-19 PROCEDURE — 94760 N-INVAS EAR/PLS OXIMETRY 1: CPT

## 2019-05-19 PROCEDURE — 65660000001 HC RM ICU INTERMED STEPDOWN

## 2019-05-19 RX ORDER — TRAMADOL HYDROCHLORIDE 50 MG/1
50 TABLET ORAL
Qty: 40 TAB | Refills: 0 | Status: SHIPPED | OUTPATIENT
Start: 2019-05-19 | End: 2019-05-20

## 2019-05-19 RX ORDER — POLYETHYLENE GLYCOL 3350 17 G/17G
17 POWDER, FOR SOLUTION ORAL DAILY
Status: DISCONTINUED | OUTPATIENT
Start: 2019-05-19 | End: 2019-05-20 | Stop reason: HOSPADM

## 2019-05-19 RX ORDER — BENZONATATE 100 MG/1
100 CAPSULE ORAL 3 TIMES DAILY
Qty: 21 CAP | Refills: 0 | Status: SHIPPED | OUTPATIENT
Start: 2019-05-19 | End: 2019-05-26

## 2019-05-19 RX ORDER — AMOXICILLIN 250 MG
1 CAPSULE ORAL 2 TIMES DAILY
Qty: 60 TAB | Refills: 0 | Status: SHIPPED | OUTPATIENT
Start: 2019-05-19 | End: 2019-05-20

## 2019-05-19 RX ORDER — METOPROLOL TARTRATE 25 MG/1
12.5 TABLET, FILM COATED ORAL EVERY 12 HOURS
Qty: 60 TAB | Refills: 1 | Status: SHIPPED | OUTPATIENT
Start: 2019-05-19 | End: 2019-05-20

## 2019-05-19 RX ADMIN — FAMOTIDINE 20 MG: 20 TABLET ORAL at 08:18

## 2019-05-19 RX ADMIN — POLYETHYLENE GLYCOL 3350 17 G: 17 POWDER, FOR SOLUTION ORAL at 11:52

## 2019-05-19 RX ADMIN — MONTELUKAST SODIUM 10 MG: 10 TABLET, FILM COATED ORAL at 22:09

## 2019-05-19 RX ADMIN — AZELASTINE HYDROCHLORIDE 2 SPRAY: 137 SPRAY, METERED NASAL at 18:05

## 2019-05-19 RX ADMIN — CHLORHEXIDINE GLUCONATE 10 ML: 1.2 RINSE ORAL at 08:20

## 2019-05-19 RX ADMIN — GUAIFENESIN 1200 MG: 600 TABLET, EXTENDED RELEASE ORAL at 08:18

## 2019-05-19 RX ADMIN — FAMOTIDINE 20 MG: 20 TABLET ORAL at 22:09

## 2019-05-19 RX ADMIN — Medication 10 ML: at 22:18

## 2019-05-19 RX ADMIN — METOPROLOL TARTRATE 12.5 MG: 25 TABLET ORAL at 22:09

## 2019-05-19 RX ADMIN — BUDESONIDE 500 MCG: 0.5 INHALANT RESPIRATORY (INHALATION) at 19:21

## 2019-05-19 RX ADMIN — AMIODARONE HYDROCHLORIDE 400 MG: 200 TABLET ORAL at 08:18

## 2019-05-19 RX ADMIN — OXYCODONE AND ACETAMINOPHEN 1 TABLET: 5; 325 TABLET ORAL at 04:01

## 2019-05-19 RX ADMIN — CHLORHEXIDINE GLUCONATE 10 ML: 1.2 RINSE ORAL at 22:17

## 2019-05-19 RX ADMIN — CETIRIZINE HYDROCHLORIDE 10 MG: 10 TABLET, FILM COATED ORAL at 08:18

## 2019-05-19 RX ADMIN — ATORVASTATIN CALCIUM 40 MG: 40 TABLET, FILM COATED ORAL at 08:18

## 2019-05-19 RX ADMIN — METOPROLOL TARTRATE 12.5 MG: 25 TABLET ORAL at 08:18

## 2019-05-19 RX ADMIN — FLUTICASONE PROPIONATE 2 SPRAY: 50 SPRAY, METERED NASAL at 08:19

## 2019-05-19 RX ADMIN — FINASTERIDE 5 MG: 5 TABLET, FILM COATED ORAL at 08:18

## 2019-05-19 RX ADMIN — Medication 10 ML: at 14:00

## 2019-05-19 RX ADMIN — Medication 10 ML: at 22:19

## 2019-05-19 RX ADMIN — SENNOSIDES, DOCUSATE SODIUM 1 TABLET: 50; 8.6 TABLET, FILM COATED ORAL at 08:18

## 2019-05-19 RX ADMIN — SENNOSIDES, DOCUSATE SODIUM 1 TABLET: 50; 8.6 TABLET, FILM COATED ORAL at 18:05

## 2019-05-19 RX ADMIN — BUDESONIDE 500 MCG: 0.5 INHALANT RESPIRATORY (INHALATION) at 07:21

## 2019-05-19 RX ADMIN — AZELASTINE HYDROCHLORIDE 2 SPRAY: 137 SPRAY, METERED NASAL at 08:19

## 2019-05-19 RX ADMIN — Medication 10 ML: at 08:09

## 2019-05-19 RX ADMIN — TAMSULOSIN HYDROCHLORIDE 0.8 MG: 0.4 CAPSULE ORAL at 08:18

## 2019-05-19 RX ADMIN — BENZONATATE 100 MG: 100 CAPSULE ORAL at 18:05

## 2019-05-19 RX ADMIN — ASPIRIN 81 MG 81 MG: 81 TABLET ORAL at 08:18

## 2019-05-19 RX ADMIN — ARFORMOTEROL TARTRATE 15 MCG: 15 SOLUTION RESPIRATORY (INHALATION) at 07:21

## 2019-05-19 RX ADMIN — BENZONATATE 100 MG: 100 CAPSULE ORAL at 08:18

## 2019-05-19 RX ADMIN — POLYETHYLENE GLYCOL 3350 17 G: 17 POWDER, FOR SOLUTION ORAL at 08:18

## 2019-05-19 RX ADMIN — AMIODARONE HYDROCHLORIDE 400 MG: 200 TABLET ORAL at 22:08

## 2019-05-19 RX ADMIN — GUAIFENESIN 1200 MG: 600 TABLET, EXTENDED RELEASE ORAL at 22:09

## 2019-05-19 RX ADMIN — ARFORMOTEROL TARTRATE 15 MCG: 15 SOLUTION RESPIRATORY (INHALATION) at 19:21

## 2019-05-19 RX ADMIN — BENZONATATE 100 MG: 100 CAPSULE ORAL at 22:09

## 2019-05-19 NOTE — PROGRESS NOTES
[facility] Progress Note Admit Date: 2019 POD:   5 Day Post-Op Procedure:  Procedure(s): 
CABG x 3 WITH CPB; DONOR SITES: LIMA AND RIGHT SAPHENOUS VEIN VIA EVH; CÉSAR AND EPI AORTIC BY DR. Rosalva Tellez. Subjective:  
Pt seen with Dr. Harley Serrato. In chair, off 02. Chronic cough, slightly improving. On RA, 97%. NSR 70s. -BM, +flatus Objective:  
Vitals: 
Blood pressure 105/45, pulse 72, temperature 97.6 °F (36.4 °C), resp. rate 18, height 5' 7\" (1.702 m), weight 160 lb 15 oz (73 kg), SpO2 (!) 89 %. Temp (24hrs), Av °F (36.7 °C), Min:97.6 °F (36.4 °C), Max:98.3 °F (36.8 °C) EKG/Rhythm: NSR 70s Oxygen Therapy: RA 
 
CXR: 
CXR Results  (Last 48 hours) 19 0741  XR CHEST PA LAT Final result Impression:  IMPRESSION:   
Increased, small left pleural effusion. Trace right pleural effusion. Narrative:  PA AND LATERAL CHEST RADIOGRAPHS: 2019 7:41 AM  
   
INDICATION: Nonspecific abnormal function study, cardiovascular, coronary artery  
disease. Follow-up pleural effusions. COMPARISON: 2019, 2019, 5/15/2019, 2019. TECHNIQUE: Frontal and lateral radiographs of the chest.  
   
FINDINGS:  
A left chest tube has been removed, and a small left pleural effusion has  
probably increased slightly. There is a trace right pleural effusion as well. Aside from basilar atelectasis, the lungs are clear. The central airways are  
patent. No pneumothorax. Post CABG. Admission Weight: Last Weight Weight: 161 lb (73 kg) Weight: 160 lb 15 oz (73 kg) Intake / Output / Drain: 
Current Shift: No intake/output data recorded. Last 24 hrs.:  
 
Intake/Output Summary (Last 24 hours) at 2019 7172 Last data filed at 2019 1674 Gross per 24 hour Intake 490 ml Output 650 ml Net -160 ml EXAM: 
General:  NAD                                                                                
  
 Lungs:   Occasional rhonchi bilaterally. Incision:  Mid sternal incision clean, dry, intact Heart:  Regular rate and rhythm, S1, S2 normal, no murmur, click, rub or gallop. Abdomen:   Soft, non-tender. Bowel sounds normal. No masses,  No organomegaly. Extremities:  No edema. PPP. Neurologic:  Gross motor and sensory apparatus intact. Labs:  
Recent Labs 19 
0408  19 
1107  19 
0422 WBC 15.3*   < >  --   --  21.1* HGB 12.5   < >  --   --  11.8* HCT 39.1   < >  --   --  35.8*    < >  --   --  202    < >  --   --  140  
K 3.9   < >  --   --  4.3 BUN 20   < >  --   --  21* CREA 1.00   < >  --   --  1.06  
GLU 94   < >  --   --  169* GLUCPOC  --   --  134*   < >  --   
INR  --   --   --   --  1.2*  
 < > = values in this interval not displayed. Assessment:  
 
Active Problems: 
  CAD (coronary artery disease) (2019) S/P CABG x 3 (2019) Overview: CABG X 3 LIMA to LAD, RSVG to RAMUS, RSVG to RCA 
    RIGHT ENDOSCOPIC Ventanilla De Arslan 55 Plan/Recommendations/Medical Decision Makin.  CAD s/p CABG, EF 65%: ASA/Statin, BB . 2. Dysphagia/Weight loss/new onset GERD/? Pancreatic mass: MRI negative for pancreatic mass. UGI Series shows small hiatal hernia & no other acute findings. GI recommends outpatient follow up & has signed off. Has been able to swallow foods post-operatively. 3. Hx of R carotid endarectomy: stable 4. Hx of PVD,(L SFA BAP 2007, R SFA atherectomy 3/2018): stable 5. COPD:  PRN duo-nebs, steroid nebs. Zrytec, Flonase, nasal azelastine Singulair. Pulmonary following. Chest CT negative, last dose Prednisone 20mg today. 6. HLD:  Cont statin.   
 
7. BPH:  cont flomax/proscar. Making urine on own. 8. SR w/ 1st deg block: Sinus atiya post op. NSR in 70s now. Start low dose BB today 9.  Anticipated acute post op resp insufficiency: wean NC for O2 >90%, IS, activity as tolerated. Pulmonary toileting. Will d/c CT today. CXR PA/lat ordered for tomorrow. 10. Leukocytosis: Likely related to post op state/steroids. Improving. CXR clear. Monitor. 11. Confusion: Improving. Likely medication related, hold Xanax. Monitor. 12. Constipation: +flatus, active bowel sounds. D/c Fe/Mag. PRN Dulcolax, Miralax, senna. Activity as tolerated. 13. Dispo: continue to increase activity. Not ready just yet for d/c home. Should be ready today. Needs to have BM and walk more.   
 
Signed By: Giovanna Dixon PA-C

## 2019-05-19 NOTE — PROGRESS NOTES
Problem: Mobility Impaired (Adult and Pediatric) Goal: *Acute Goals and Plan of Care (Insert Text) Description Physical Therapy Goals Initiated 5/15/2019 1. Patient will move from supine to sit and sit to supine, scoot up and down and roll side to side in bed with modified independence within 5 days. 2.  Patient will perform sit to/from stand with supervision/set-up within 5 days. 3.  Patient will ambulate 150 feet with least restrictive assistive device and supervision/set-up within 5 days. 4.  Patient will ascend/descend 6 stairs with handrail(s), per home set-up, with supervision/set-up within 5 days. 5.  Patient will perform cardiac exercises per protocol with independence within 5 days. 6.  Patient will verbally and functionally recall 3/3 sternal precautions within 5 days. Outcome: Progressing Towards Goal 
  
PHYSICAL THERAPY TREATMENT Patient: Saroj Grant (32 y.o. male) Date: 5/19/2019 Diagnosis: Nonspecific abnormal function study, cardiovascular [R94.30] CAD (coronary artery disease) [I25.10] CAD (coronary artery disease) [I25.10] <principal problem not specified> Procedure(s) (LRB): 
CABG x 3 WITH CPB; DONOR SITES: LIMA AND RIGHT SAPHENOUS VEIN VIA EVH; CÉSAR AND EPI AORTIC BY DR. Gallito Palafox. (N/A) 5 Days Post-Op Precautions: Fall, Sternal, Bed/Chair Alarm Chart, physical therapy assessment, plan of care and goals were reviewed. ASSESSMENT: 
Patient continues to demonstrate only fair compliance to sternal precautions with functional tasks, requiring frequent verbal reminding. Patient able to recall 2/3 sternal precautions. Continue to note significantly decreased gait josh, intermittent freezing/festinating gait pattern, trunk sway, and high guard (despite verbal/tactile/demonstration for incorporation of reciprocal arm swing). Patient able to negotiate steps with improved use of railing for balance support ONLY. When patient is discharged home, he will need 24/7 supervision for reminding of sternal precautions and HHPT follow up in order to maximize home safety and functional independence. Reviewed 5-lb weight lifting restrictions, use of \"chest splinting\" while coughing/sneezing, continued use of incentive spirometer 10 x per hour, cardiac exercises and frequency of performance, signs/symptoms of DVTs/pneumonia/sternal wound dehiscence, and importance of frequent mobilization at home. Progression toward goals: 
?    Improving appropriately and progressing toward goals ? Improving slowly and progressing toward goals ? Not making progress toward goals and plan of care will be adjusted PLAN: 
Patient continues to benefit from skilled intervention to address the above impairments. Continue treatment per established plan of care. Discharge Recommendations:  Home Health + 24 hour supervision from wife Further Equipment Recommendations for Discharge:  None SUBJECTIVE:  
Patient stated ? How long can I not push/pull?? 
 
OBJECTIVE DATA SUMMARY:  
Critical Behavior: 
Neurologic State: Alert, Appropriate for age Orientation Level: Oriented X4 Cognition: Appropriate decision making, Appropriate for age attention/concentration, Appropriate safety awareness, Follows commands Safety/Judgement: Awareness of environment, Decreased awareness of need for assistance, Decreased awareness of need for safety, Decreased insight into deficits Functional Mobility Training: 
 
Transfers: 
Sit to Stand: Supervision Stand to Sit: Supervision(VCs for technique/adherence to sternal precautions) Balance: 
Sitting: Intact Standing: Impaired; Without support Standing - Static: Good Standing - Dynamic : Fair(Freezing, decreased foot clearance) Ambulation/Gait Training: 
Distance (ft): 200 Feet (ft) Assistive Device: Gait belt Ambulation - Level of Assistance: Contact guard assistance;Stand-by assistance Gait Abnormalities: Shuffling gait; Decreased step clearance; Festinating gait Base of Support: Narrowed Speed/Portia: Pace decreased (<100 feet/min); Fluctuations Stairs: 
Number of Stairs Trained: 6 Stairs - Level of Assistance: Supervision Rail Use: Right (For balance only) Pain: 
Pain Scale 1: Numeric (0 - 10) Pain Intensity 1: 0 Pain Location 1: Elbow Pain Orientation 1: Right Pain Description 1: Aching Activity Tolerance: 
Please refer to the flowsheet for vital signs taken during this treatment. After treatment:  
?    Patient left in no apparent distress sitting up in chair ? Patient left in no apparent distress in bed 
? Call bell left within reach ? Nursing notified ? Caregiver present ? Chair alarm activated COMMUNICATION/COLLABORATION:  
The patient?s plan of care was discussed with: Registered Nurse Albert Lagos PT, DPT Time Calculation: 33 mins

## 2019-05-19 NOTE — PROGRESS NOTES
Verbal shift change report given to Alisson Geiger (oncoming nurse) by Masood Mckenzie (offgoing nurse). Report included the following information SBAR, MAR, Recent Results and Med Rec Status. Problem: Falls - Risk of 
Goal: *Absence of Falls Description Document Iveth Arriaga Fall Risk and appropriate interventions in the flowsheet. Outcome: Progressing Towards Goal 
  
Problem: CABG: Post-Op Day 5 Goal: Discharge Planning Outcome: Progressing Towards Goal 
  
 
1156 [place] text to WAGNER JO. Patient now has oxygen saturation of 90-92% and pending discharge. 1210 return call from 1401 [facility] to have  PA and LAT continue incentive spirometry. 1405 Patient ambulated multiple times to bathroom to have a bowel movement with no success. I have offered prune juice and a suppository several times and patient continues to refuse. Patient passing gas at this time and continues to have active bowel sounds. Dietary in with patient to discuss meals. Patient refused dinner ordering and asked that no meal be brought. Asked patient to try soup for the evening. 1700 Patient did not eat dinner, nor did he want. Patient cleaned up, shaved and encouraged to eat. Again refused suppository and prune juice. Concern with patient as he does seems to have short term memory issues. For instance, the patient having issues remembering conversations from a few minutes prior. Patient repeatedly asking same/similar questions or finishing tasks. Reinforcing sternal precautions over and over. RN noticed tremor in hands (R>L) and asked patient if he has a tremor. Patient stated yes and that he saw a neurologist who said he had an essential tremor. Patient states that this tremor has gotten worse. Earlier in the day I spoke to the patients wife who stated that the patient called her stating the doctor told him that he could not go home. I did tell her that he had orders to go home if he had a BM.  Also informed her that the physician also has not been around since this morning. 1900 Spoke to son regarding patient. Informed him of days events. Asked that he bring something that he thinks that his father would eat. He stated that he will get with his mom. 1930 Verbal shift change report given to Shen Corral (oncoming nurse) by Gabrielle Ochoa (offgoing nurse). Report included the following information SBAR, MAR, Recent Results and Med Rec Status.

## 2019-05-19 NOTE — DISCHARGE SUMMARY
[facility] Discharge Summary     Patient ID:  Shira Miguel  [id number]  24 y.o.  1945    Admit date: 5/9/2019    Discharge date: 5/20/2019     Admitting Physician: Lu Dacosta MD     Referring Cardiologist:  Dr Rosalia Cary    PCP:  Logan Franklin MD     Admitting Diagnoses:   CAD     Discharge Diagnoses:   CAD    Discharge Condition: stable    Hospital Problems  Date Reviewed: 5/14/2019          Codes Class Noted POA    S/P CABG x 3 ICD-10-CM: Z95.1  ICD-9-CM: V45.81  5/14/2019 Unknown    Overview Signed 5/14/2019  4:48 PM by Marlin Kinsey PA-C     CABG X 3 LIMA to LAD, RSVG to RAMUS, RSVG to RCA  RIGHT ENDOSCOPIC SAPHENOUS VEIN HARVEST               CAD (coronary artery disease) ICD-10-CM: I25.10  ICD-9-CM: 414.00  5/9/2019 Unknown              Procedures for this admission:  Procedure(s):  CABG x 3 WITH CPB; DONOR SITES: LIMA AND RIGHT SAPHENOUS VEIN VIA EVH; CÉSAR AND EPI AORTIC BY DR. Michael Hernández. Condition on Discharge: stable     Followup Care: Activity as tolerated, no lifting, pushing, pulling >5lbs X 30days  Diabetic Diet  Keep wound clean and dry    Follow-up Information     Follow up With Specialties Details Why Contact Info Additional Information    Lise Jimenez Call on 5/21/2019 Home Health skilled nursing. Call agency if you have not heard from them by 12:00 p.m.  [facility] [address]     [address] on 6/13/2019 cardiac rehab intake appointment at 1:00 PM with [facility] [address]  [place] [address] [address], [address]. Please arrive 15 minutes prior to your appointment time and you will register in the [place] [address], [address], on the first floor of the [address] Telephone: [phone] Fax: [phone] Driving directions To [facility]. Building: Driving WEST on [address], take exit [address] to [place]. Turn left onto [address] [facility] [address], then turn right into [employer] lot Driving EAST on [address], take exit [address] [facility]. Turn right at the end of the exit ramp. Turn left onto [address], then turn right into [employer] lot. Misael Akhtar., [address]   [address]  [address]  [phone]             Patient Disposition:  Home    DC med list:     My Medications      START taking these medications      Instructions Each Dose to Equal Morning Noon Evening Bedtime   benzonatate 100 mg capsule  Commonly known as:  TESSALON    Your last dose was: Your next dose is: Take 1 Cap by mouth three (3) times daily for 7 days. 100 mg                 guaiFENesin 1,200 mg Ta12 ER tablet  Commonly known as:  [address]    Your last dose was: Your next dose is: Take 1 Tab by mouth every twelve (12) hours. 1200 mg                 metoprolol tartrate 25 mg tablet  Commonly known as:  LOPRESSOR    Your last dose was: Your next dose is: Take 0.5 Tabs by mouth every twelve (12) hours. 12.5 mg                 oxyCODONE-acetaminophen 5-325 mg per tablet  Commonly known as:  PERCOCET    Your last dose was: Your next dose is: Take 1 Tab by mouth every six (6) hours as needed for Pain for up to 7 days. Max Daily Amount: 4 Tabs. 1 Tab                 polyethylene glycol 17 gram packet  Commonly known as:  MIRALAX  Start taking on:  5/21/2019    Your last dose was: Your next dose is: Take 1 Packet by mouth daily. Take daily until having regular bowel movements. 17 g                 senna-docusate 8.6-50 mg per tablet  Commonly known as:  PERICOLACE    Your last dose was: Your next dose is: Take 1 Tab by mouth two (2) times a day.    1 Tab                    CONTINUE taking these medications      Instructions Each Dose to Equal Morning Noon Evening Bedtime   ALLER-SUKUMAR NA    Your last dose was: Your next dose is:          by Nasal route two (2) times a day. aspirin 81 mg tablet    Your last dose was: Your next dose is: Take 81 mg by mouth nightly. 81 mg                 atorvastatin 40 mg tablet  Commonly known as:  LIPITOR    Your last dose was: Your next dose is: Take 40 mg by mouth daily. 40 mg                 BEVESPI AEROSPHERE 9-4.8 mcg Hfaa  Generic drug:  glycopyrrolate-formoterol    Your last dose was: Your next dose is: Take  by inhalation as needed (Wheezing). DIPHENHIST 25 mg capsule  Generic drug:  diphenhydrAMINE    Your last dose was: Your next dose is: Take 25 mg by mouth daily. At bedtime   Indications: chronic trouble sleeping   25 mg                 MAGNESIUM PO    Your last dose was: Your next dose is: Take 250 mg by mouth nightly. 250 mg                 montelukast 10 mg tablet  Commonly known as:  SINGULAIR    Your last dose was: Your next dose is: Take 10 mg by mouth daily. 10 mg                 pantoprazole 40 mg tablet  Commonly known as:  PROTONIX    Your last dose was: Your next dose is: Take 40 mg by mouth daily. 40 mg                 PROAIR HFA 90 mcg/actuation inhaler  Generic drug:  albuterol    Your last dose was: Your next dose is: Take 1 Puff by inhalation every four (4) hours as needed for Wheezing. 1 Puff                 tamsulosin 0.4 mg capsule  Commonly known as:  FLOMAX    Your last dose was: Your next dose is: Take 0.4 mg by mouth daily.    0.4 mg                       Where to Get Your Medications      These medications were sent to [address]  [facility], [address] [place] [address]     Phone:  [phone]   · benzonatate 100 mg capsule  · guaiFENesin 1,200 mg Ta12 ER tablet  · metoprolol tartrate 25 mg tablet  · polyethylene glycol 17 gram packet  · senna-docusate 8.6-50 mg per tablet     Information on where to get these meds will be given to you by the nurse or doctor. Ask your nurse or doctor about these medications  · oxyCODONE-acetaminophen 5-325 mg per tablet         HPI:  Tavares Mercado is a 75 y.o. male who was referred for cardiac evaluation of coronary artery disease, referred by Dr. Neelima Belcher. Pt's PMH includes R carotid endarterectomy, PAD(s/p L  LE angioplasty), Hyperlipidemia, arthritis, COPD, BPH (had had issues in past with urination post surgery/anesthesia). Pt reports several month history of fatigue, lethargy, weight loss (about 15 lbs), productive cough, new onset reflux, some mild dysphagia, diarrhea (about 3 weeks, better in last 3 days). Pt denies SOB, CP, syncope, palpitations, edema, orthopnea, or PND. Pt's been managed by PCP, who had set pt up for Abdomen CT for next week and further follow up with pulmonary.       Pt is retired, and despite more fatigue is still able to golf and work in the garden. He is  and lives with his wife in single family home. His sister also accompanies him today. Pt is active tobacco smoker (3/4 PPD x 50 years), drinks 3-4 alcoholic drinks/week and denies drug use. Denies family history of cardiac problems.         Hospital Course:   POD#6:  1.  CAD s/p CABG, EF 65%: ASA/Statin, BB .     2. Dysphagia/Weight loss/new onset GERD/? Pancreatic mass: MRI negative for pancreatic mass. UGI Series shows small hiatal hernia & no other acute findings. GI recommends outpatient follow up & has signed off. Has been able to swallow foods post-operatively.      3. Hx of R carotid endarectomy: stable     4. Hx of PVD,(L SFA BAP 1/2007, R SFA atherectomy 3/2018): stable     5. COPD:  PRN duo-nebs, steroid nebs. Zrytec, Flonase, nasal azelastine Singulair. Pulmonary following. Chest CT negative,     6. HLD:  Cont statin.       7.  BPH:  cont flomax/proscar. Making urine on own.      8. SR w/ 1st deg block: Sinus atiya post op. NSR in 70s now. Cont BB     9. Anticipated acute post op resp insufficiency: wean NC for O2 >90%, IS, activity as tolerated. Pulmonary toileting.      10. Leukocytosis: Likely related to post op state/steroids. Improving. CXR clear. Monitor.      11. Confusion: Improving. Likely medication related, hold Xanax. Monitor.     12. Constipation: +flatus, active bowel sounds. D/c Fe/Mag. PRN Dulcolax, Miralax, senna. Activity as tolerated. + BM 5/19 per pt.      13. Dispo: continue to increase activity. home today          Discharge Vital Signs:   Visit Vitals  /44 (BP 1 Location: Left arm, BP Patient Position: Sitting)   Pulse 85   Temp 97.4 °F (36.3 °C)   Resp 18   Ht 5' 7\" (1.702 m)   Wt 159 lb 6.3 oz (72.3 kg)   SpO2 91%   BMI 24.96 kg/m²       Labs:   Recent Labs     05/20/19  0433  05/17/19  1107   WBC 14.8*   < >  --    HGB 12.6   < >  --    HCT 38.8   < >  --       < >  --       < >  --    K 3.7   < >  --    BUN 21*   < >  --    CREA 1.02   < >  --    GLU 83   < >  --    GLUCPOC  --   --  134*    < > = values in this interval not displayed. Diagnostics: CXR 5/18/19  A left chest tube has been removed, and a small left pleural effusion has  probably increased slightly. There is a trace right pleural effusion as well. Aside from basilar atelectasis, the lungs are clear. The central airways are  patent. No pneumothorax. Post CABG. Patient Instructions:  Discharge instructions were reviewed with the patient and family present. Questions were also answered at this time. Prescriptions and medications were reviewed. The patient has a follow up appointment 05/24/19 at 11a. Your second follow up appointment will be in four weeks, on 06/19/19 at 10:30a. The patient and family were encouraged to call with any questions or concerns.        Signed:  Stefano Galan NP  5/20/2019  9:10 AM

## 2019-05-20 VITALS
RESPIRATION RATE: 18 BRPM | HEIGHT: 67 IN | OXYGEN SATURATION: 91 % | BODY MASS INDEX: 25.02 KG/M2 | WEIGHT: 159.39 LBS | TEMPERATURE: 97.4 F | DIASTOLIC BLOOD PRESSURE: 44 MMHG | SYSTOLIC BLOOD PRESSURE: 124 MMHG | HEART RATE: 85 BPM

## 2019-05-20 LAB
ALBUMIN SERPL-MCNC: 2.5 G/DL (ref 3.5–5)
ALBUMIN/GLOB SERPL: 0.7 {RATIO} (ref 1.1–2.2)
ALP SERPL-CCNC: 84 U/L (ref 45–117)
ALT SERPL-CCNC: 20 U/L (ref 12–78)
ANION GAP SERPL CALC-SCNC: 7 MMOL/L (ref 5–15)
AST SERPL-CCNC: 28 U/L (ref 15–37)
BILIRUB SERPL-MCNC: 1 MG/DL (ref 0.2–1)
BUN SERPL-MCNC: 21 MG/DL (ref 6–20)
BUN/CREAT SERPL: 21 (ref 12–20)
CALCIUM SERPL-MCNC: 8.6 MG/DL (ref 8.5–10.1)
CHLORIDE SERPL-SCNC: 104 MMOL/L (ref 97–108)
CO2 SERPL-SCNC: 29 MMOL/L (ref 21–32)
CREAT SERPL-MCNC: 1.02 MG/DL (ref 0.7–1.3)
ERYTHROCYTE [DISTWIDTH] IN BLOOD BY AUTOMATED COUNT: 12.8 % (ref 11.5–14.5)
GLOBULIN SER CALC-MCNC: 3.7 G/DL (ref 2–4)
GLUCOSE SERPL-MCNC: 83 MG/DL (ref 65–100)
HCT VFR BLD AUTO: 38.8 % (ref 36.6–50.3)
HGB BLD-MCNC: 12.6 G/DL (ref 12.1–17)
MCH RBC QN AUTO: 31 PG (ref 26–34)
MCHC RBC AUTO-ENTMCNC: 32.5 G/DL (ref 30–36.5)
MCV RBC AUTO: 95.6 FL (ref 80–99)
NRBC # BLD: 0 K/UL (ref 0–0.01)
NRBC BLD-RTO: 0 PER 100 WBC
PLATELET # BLD AUTO: 311 K/UL (ref 150–400)
PMV BLD AUTO: 10.1 FL (ref 8.9–12.9)
POTASSIUM SERPL-SCNC: 3.7 MMOL/L (ref 3.5–5.1)
PROT SERPL-MCNC: 6.2 G/DL (ref 6.4–8.2)
RBC # BLD AUTO: 4.06 M/UL (ref 4.1–5.7)
SODIUM SERPL-SCNC: 140 MMOL/L (ref 136–145)
WBC # BLD AUTO: 14.8 K/UL (ref 4.1–11.1)

## 2019-05-20 PROCEDURE — 77010033678 HC OXYGEN DAILY

## 2019-05-20 PROCEDURE — 97535 SELF CARE MNGMENT TRAINING: CPT

## 2019-05-20 PROCEDURE — 74011250637 HC RX REV CODE- 250/637: Performed by: PHYSICIAN ASSISTANT

## 2019-05-20 PROCEDURE — 94640 AIRWAY INHALATION TREATMENT: CPT

## 2019-05-20 PROCEDURE — 94760 N-INVAS EAR/PLS OXIMETRY 1: CPT

## 2019-05-20 PROCEDURE — 80053 COMPREHEN METABOLIC PANEL: CPT

## 2019-05-20 PROCEDURE — 74011000250 HC RX REV CODE- 250: Performed by: PHYSICIAN ASSISTANT

## 2019-05-20 PROCEDURE — 36415 COLL VENOUS BLD VENIPUNCTURE: CPT

## 2019-05-20 PROCEDURE — 85027 COMPLETE CBC AUTOMATED: CPT

## 2019-05-20 PROCEDURE — 97530 THERAPEUTIC ACTIVITIES: CPT

## 2019-05-20 RX ORDER — POLYETHYLENE GLYCOL 3350 17 G/17G
17 POWDER, FOR SOLUTION ORAL DAILY
Qty: 7 PACKET | Refills: 0 | Status: SHIPPED | OUTPATIENT
Start: 2019-05-21 | End: 2019-08-27

## 2019-05-20 RX ORDER — AMOXICILLIN 250 MG
1 CAPSULE ORAL 2 TIMES DAILY
Qty: 60 TAB | Refills: 0 | Status: SHIPPED | OUTPATIENT
Start: 2019-05-20 | End: 2019-08-27

## 2019-05-20 RX ORDER — POLYETHYLENE GLYCOL 3350 17 G/17G
17 POWDER, FOR SOLUTION ORAL DAILY
Qty: 7 PACKET | Refills: 0 | Status: SHIPPED
Start: 2019-05-21 | End: 2019-05-20

## 2019-05-20 RX ORDER — OXYCODONE AND ACETAMINOPHEN 5; 325 MG/1; MG/1
1 TABLET ORAL
Qty: 40 TAB | Refills: 0 | Status: SHIPPED | OUTPATIENT
Start: 2019-05-20 | End: 2019-05-27

## 2019-05-20 RX ORDER — METOPROLOL TARTRATE 25 MG/1
12.5 TABLET, FILM COATED ORAL EVERY 12 HOURS
Qty: 60 TAB | Refills: 1 | Status: SHIPPED | OUTPATIENT
Start: 2019-05-20

## 2019-05-20 RX ADMIN — ATORVASTATIN CALCIUM 40 MG: 40 TABLET, FILM COATED ORAL at 08:36

## 2019-05-20 RX ADMIN — POLYETHYLENE GLYCOL 3350 17 G: 17 POWDER, FOR SOLUTION ORAL at 08:36

## 2019-05-20 RX ADMIN — ARFORMOTEROL TARTRATE 15 MCG: 15 SOLUTION RESPIRATORY (INHALATION) at 07:17

## 2019-05-20 RX ADMIN — SENNOSIDES, DOCUSATE SODIUM 1 TABLET: 50; 8.6 TABLET, FILM COATED ORAL at 08:36

## 2019-05-20 RX ADMIN — BENZONATATE 100 MG: 100 CAPSULE ORAL at 08:37

## 2019-05-20 RX ADMIN — CHLORHEXIDINE GLUCONATE 10 ML: 1.2 RINSE ORAL at 08:41

## 2019-05-20 RX ADMIN — METOPROLOL TARTRATE 12.5 MG: 25 TABLET ORAL at 08:37

## 2019-05-20 RX ADMIN — CETIRIZINE HYDROCHLORIDE 10 MG: 10 TABLET, FILM COATED ORAL at 08:37

## 2019-05-20 RX ADMIN — FINASTERIDE 5 MG: 5 TABLET, FILM COATED ORAL at 08:36

## 2019-05-20 RX ADMIN — AMIODARONE HYDROCHLORIDE 400 MG: 200 TABLET ORAL at 08:37

## 2019-05-20 RX ADMIN — ASPIRIN 81 MG 81 MG: 81 TABLET ORAL at 08:37

## 2019-05-20 RX ADMIN — BUDESONIDE 500 MCG: 0.5 INHALANT RESPIRATORY (INHALATION) at 07:17

## 2019-05-20 RX ADMIN — FLUTICASONE PROPIONATE 2 SPRAY: 50 SPRAY, METERED NASAL at 08:42

## 2019-05-20 RX ADMIN — TAMSULOSIN HYDROCHLORIDE 0.8 MG: 0.4 CAPSULE ORAL at 08:36

## 2019-05-20 RX ADMIN — AZELASTINE HYDROCHLORIDE 2 SPRAY: 137 SPRAY, METERED NASAL at 08:42

## 2019-05-20 RX ADMIN — GUAIFENESIN 1200 MG: 600 TABLET, EXTENDED RELEASE ORAL at 08:37

## 2019-05-20 RX ADMIN — Medication 10 ML: at 07:25

## 2019-05-20 RX ADMIN — FAMOTIDINE 20 MG: 20 TABLET ORAL at 08:37

## 2019-05-20 NOTE — PROGRESS NOTES
2015: Patient received sitting in chair watching TV. Assessment performed. Patient states that he had a bowel movement earlier in the evening, however, no evidence (I.e. In toilet nor odor) of such reported by RN who assisted patient from bathroom. Patient asking when he will be able to go home since he now has had a BM. 
 
0730: Bedside and Verbal shift change report given to Alla Ramirez (oncoming nurse) by Debora Mata (offgoing nurse). Report included the following information SBAR, Kardex, OR Summary, Intake/Output, MAR, Recent Results and Cardiac Rhythm NSR. Problem: Falls - Risk of 
Goal: *Absence of Falls Description Document Sree Chuaguerita Fall Risk and appropriate interventions in the flowsheet. Outcome: Progressing Towards Goal 
Note:  
Fall Risk Interventions: 
Mobility Interventions: Communicate number of staff needed for ambulation/transfer, OT consult for ADLs, Patient to call before getting OOB, PT Consult for mobility concerns, Strengthening exercises (ROM-active/passive), Bed/chair exit alarm Mentation Interventions: Adequate sleep, hydration, pain control, Update white board, Toileting rounds, Increase mobility, More frequent rounding, Eyeglasses and hearing aids Medication Interventions: Evaluate medications/consider consulting pharmacy, Patient to call before getting OOB, Teach patient to arise slowly Elimination Interventions: Call light in reach, Patient to call for help with toileting needs, Urinal in reach Up with 1 person assistance for sternal precautions and unsteady gait. Call bell and personal effects within reach. Bed locked and in low position. Non skid footwear in place. Problem: Pressure Injury - Risk of 
Goal: *Prevention of pressure injury Description Document Gian Scale and appropriate interventions in the flowsheet. Outcome: Progressing Towards Goal 
Note:  
Pressure Injury Interventions: 
Sensory Interventions: Assess changes in LOC Activity Interventions: Increase time out of bed, Pressure redistribution bed/mattress(bed type), PT/OT evaluation Mobility Interventions: Pressure redistribution bed/mattress (bed type), PT/OT evaluation Nutrition Interventions: Document food/fluid/supplement intake Friction and Shear Interventions: HOB 30 degrees or less Turns self at appropriate intervals; skin assessed Q4H; blood glucose controlled Problem: Patient Education: Go to Patient Education Activity Goal: Patient/Family Education Outcome: Progressing Towards Goal 
  
Problem: CABG: Post-Op Day 5 Goal: Activity/Safety Outcome: Progressing Towards Goal 
Note: Up with assistance Goal: Diagnostic Test/Procedures Outcome: Progressing Towards Goal 
Note:  
Labs drawn as ordered and monitored for results.  
Goal: Psychosocial 
Outcome: Progressing Towards Goal

## 2019-05-20 NOTE — PROGRESS NOTES
[facility] Progress Note Admit Date: 2019 POD:   6 Day Post-Op Procedure:  Procedure(s): 
CABG x 3 WITH CPB; DONOR SITES: LIMA AND RIGHT SAPHENOUS VEIN VIA EVH; CÉSAR AND EPI AORTIC BY DR. Lissette Valderrama. Subjective:  
Pt seen with Dr. Jeanne Shaw. Resting in bed,  Pt adament he had BM yesterday evening. Chronic cough, slightly improving. On RA, 97%. NSR 70s. Objective:  
Vitals: 
Blood pressure 124/44, pulse 85, temperature 97.4 °F (36.3 °C), resp. rate 18, height 5' 7\" (1.702 m), weight 159 lb 6.3 oz (72.3 kg), SpO2 91 %. Temp (24hrs), Av.1 °F (36.7 °C), Min:97.4 °F (36.3 °C), Max:98.8 °F (37.1 °C) EKG/Rhythm: NSR 70s Oxygen Therapy: RA 
 
CXR: 
CXR Results  (Last 48 hours) 19 1326  XR CHEST PA LAT Final result Impression:  IMPRESSION: Left lower lobe airspace disease. Bilateral effusions. Narrative:  History: Postop. 2 views of the chest demonstrate patient is status post median sternotomy and  
CABG. Heart size appears normal. There is elevation of left hemidiaphragm with  
airspace opacification in the left lower lobe. There are small bilateral  
effusions. There is minimal atelectasis at the right base. Admission Weight: Last Weight Weight: 161 lb (73 kg) Weight: 159 lb 6.3 oz (72.3 kg) Intake / [place] / Drain: 
Current Shift:  0701 -  1900 In: -  
Out: 100 [Urine:100] Last 24 hrs.:  
 
Intake/Output Summary (Last 24 hours) at 2019 2220 Last data filed at 2019 0830 Gross per 24 hour Intake 600 ml Output 750 ml Net -150 ml EXAM: 
General:  NAD Lungs:   Occasional rhonchi bilaterally. Incision:  Mid sternal incision clean, dry, intact Heart:  Regular rate and rhythm, S1, S2 normal, no murmur, click, rub or gallop. Abdomen:   Soft, non-tender. Bowel sounds normal. No masses,  No organomegaly. Extremities:  No edema. PPP. Neurologic:  Gross motor and sensory apparatus intact. Labs:  
Recent Labs  
  19 
0433  19 
1107 WBC 14.8*   < >  --   
HGB 12.6   < >  --   
HCT 38.8   < >  --   
   < >  --   
   < >  --   
K 3.7   < >  --   
BUN 21*   < >  --   
CREA 1.02   < >  --   
GLU 83   < >  --   
GLUCPOC  --   --  134*  
 < > = values in this interval not displayed. Assessment:  
 
Active Problems: 
  CAD (coronary artery disease) (2019) S/P CABG x 3 (2019) Overview: CABG X 3 LIMA to LAD, RSVG to RAMUS, RSVG to RCA 
    RIGHT ENDOSCOPIC [address] Plan/Recommendations/Medical Decision Makin.  CAD s/p CABG, EF 65%: ASA/Statin, BB . 2. Dysphagia/Weight loss/new onset GERD/? Pancreatic mass: MRI negative for pancreatic mass. UGI Series shows small hiatal hernia & no other acute findings. GI recommends outpatient follow up & has signed off. Has been able to swallow foods post-operatively. 3. Hx of R carotid endarectomy: stable 4. Hx of PVD,(L SFA BAP 2007, R SFA atherectomy 3/2018): stable 5. COPD:  PRN duo-nebs, steroid nebs. Cont Flonase, Singulair at home. Pulmonary following. Chest CT negative, 
 
6. HLD:  Cont statin.   
 
7. BPH:  cont flomax/proscar. Making urine on own. Flomax only at d/c.   
 
8. SR w/ 1st deg block: Sinus atiya post op. NSR in 70s now. Cont BB. 9. Anticipated acute post op resp insufficiency: on RA, activity as tolerated. Pulmonary toileting. 10. Leukocytosis: Likely related to post op state/steroids. Improving. CXR clear. Monitor. 11. Confusion: Improving. Likely medication related, hold Xanax. Monitor. 12. Constipation: +flatus, active bowel sounds. D/c Fe/Mag. PRN Dulcolax, Miralax, senna. Activity as tolerated. Had BM  per pt. 13. Dispo: continue to increase activity. [facility]   
 
Signed By: Dominic Vigil NP

## 2019-05-20 NOTE — PROGRESS NOTES
Problem: Self Care Deficits Care Plan (Adult) Goal: *Acute Goals and Plan of Care (Insert Text) Description Occupational Therapy Goals Initiated 5/15/2019 1. Patient will perform ADLs standing 5 mins without fatigue or LOB with supervision/set-up within 7 day(s). 2.  Patient will perform lower body ADLs with supervision/set-up within 7 day(s). 3.  Patient will perform gathering ADL items high and low 2/2 with supervision/set-up within 7 day(s). 4.  Patient will perform toilet transfers with supervision/set-up within 7 day(s). 5.  Patient will perform all aspects of toileting with supervision/set-up within 7 day(s). 6.  Patient will participate in cardiac/sternal upper extremity therapeutic exercise/activities to increase independence with ADLs with supervision/set-up for 5 minutes within 7 day(s). Outcome: Progressing Towards Goal 
  
OCCUPATIONAL THERAPY TREATMENT Patient: Miko Riggins (48 y.o. male) Date: 5/20/2019 Diagnosis: Nonspecific abnormal function study, cardiovascular [R94.30] CAD (coronary artery disease) [I25.10] CAD (coronary artery disease) [I25.10] <principal problem not specified> Procedure(s) (LRB): 
CABG x 3 WITH CPB; DONOR SITES: LIMA AND RIGHT SAPHENOUS VEIN VIA EVH; CÉSAR AND EPI AORTIC BY DR. Sarahann Merlin. (N/A) 6 Days Post-Op Precautions: Fall, Sternal 
Chart, occupational therapy assessment, plan of care, and goals were reviewed. ASSESSMENT: 
Patient with steady progress with ADLs and functional mobility, however continues with poor carryover of sternal precautions and safety awareness, requiring mod-max cues for BUE pushing with mobility. Completed functional reaching at varying heights with cues for technique & pacing & SBA-CGA. Patient required cues/directions repeated for poor recall/memory, encouraged wife's assist & 24/7 SPV. Recommend HHOT/PT upon d/c to maximize safety & functional independence. Recommend with nursing patient to complete as able in order to maintain strength, endurance and independence: ADLs with supervision/setup, OOB to chair 3x/day and mobilizing to the bathroom for toileting with 1 assist. Thank you for your assistance. Progression toward goals: 
?       Improving appropriately and progressing toward goals ? Improving slowly and progressing toward goals ? Not making progress toward goals and plan of care will be adjusted PLAN: 
Patient continues to benefit from skilled intervention to address the above impairments. Continue treatment per established plan of care. Discharge Recommendations:  Home Health OT/PT & 24/7 SPV Further Equipment Recommendations for Discharge:  Shower chair (previously discussed with family) SUBJECTIVE:  
Patient stated ? What what as that? What was I supposed to do? .? The patient stated 2/3 sternal precautions. Reviewed all 3 with patient. OBJECTIVE DATA SUMMARY:  
Cognitive/Behavioral Status: 
Neurologic State: Alert Orientation Level: Oriented X4 Cognition: Appropriate for age attention/concentration; Impulsive;Memory loss; Follows commands;Poor safety awareness Perception: Appears intact Perseveration: No perseveration noted Safety/Judgement: Awareness of environment;Decreased awareness of need for safety;Decreased insight into deficits Functional Mobility and Transfers for ADLs: 
Bed Mobility: 
Scooting: Supervision(cues for BUE pushing) Transfers: 
Sit to Stand: Supervision(cues for BUE pushing) Balance: 
Sitting: Intact Standing: Impaired Standing - Static: Good Standing - Dynamic : Fair(slow, shuffling pace) ADL Intervention: 
  
 
Grooming Grooming Assistance: (completed with RN KENJI this morning) Cognitive Retraining Safety/Judgement: Awareness of environment;Decreased awareness of need for safety;Decreased insight into deficits Patient instructed no asymmetrical reaching over head to ensure B UEs when shoulders >90* i.e. reaching in cabinets and dressing. Instruction on upper body dressing techniques of over head, then arms through to decrease pain and unilateral shoulder flexion >90*. Instruction on the benefits of utilizing B UEs during functional tasks i.e. opening the fridge, stepping into the tub. Increase activity tolerance for home, work, and sexual intercourse by pacing self with increasing the arm exercises, sitting duration, frequency OOB, walking, standing, and ADLs. Instructed and indicated understanding of s/s of too much activity, how to respond to s/s safely. Therapeutic Exercises:  
Functional reaching at varying heights with SBA for overhead reach, CGA for low cabinet reach, mod cues for technique throughout Pain: 
Pain Scale 1: Numeric (0 - 10) Pain Intensity 1: 0 Activity Tolerance:  
Good Please refer to the flowsheet for vital signs taken during this treatment. After treatment:  
? Patient left in no apparent distress sitting up in chair ? Patient left in no apparent distress in bed 
? Call bell left within reach ? Nursing notified ? Caregiver present ? Bed alarm activated COMMUNICATION/COLLABORATION:  
The patient?s plan of care was discussed with: Physical Therapist and Registered Nurse Cleora Cockayne, OTD, OTR/L Time Calculation: 23 mins

## 2019-05-20 NOTE — DISCHARGE INSTRUCTIONS
Smoking Cessation Program: This is a free, phone/text/email based, smoking cessation program. The program is individualized to meet each patient's needs. To enroll use the link - [facility]. [facility]/quit or text Shari Jessy to [phone] from any smart phone. Cardiac Surgery Specialist    [address] [place] [address] [employer]                             [place] [address] [address]  Office- [phone]  Fax- [phone]       Office- [phone]  Fax- [phone]  _____________________________________________________________  Dr. Gabby Castro Patient  Dr. Magnolia Rodríguez [address], [facility]  [address] NOREEN Saucedo PA-C  ______________________________________________________________    Name:Bruno Stallings     Surgery & Date: Procedure(s):  CABG x 3 WITH CPB; DONOR SITES: LIMA AND RIGHT SAPHENOUS VEIN VIA EVH; CÉSAR AND EPI AORTIC BY DR. Javad Zuniga. Discharge Date: 5/20/2019    INSTRUCTIONS:  1. NO SMOKING OR TOBACCO PRODUCTS  2. Follow all the instructions in your discharge book  3. You may shower. Wash all incisions twice daily with mild soap and water. No lotions, ointments or powder. 4. Call the office immediately for any redness, swelling, or drainage from your incision. 5. Take your temperature daily and call for a temperature of 101 degrees or higher or for any symptoms that make you think you have and infection. 6. Weigh yourself each morning. Call if you gain more than 5 pounds in 48 hours. 7. Use the incentive spirometer 6-8 times a day-10 breaths each time. Use a pillow or your bear to splint your breastbone when coughing or sneezing. 8. If you feel your breast bone clicking or popping, notify the office immediately.    9. Walk several hundred feet several times daily.    DIET  Eat an American Heart Association diet. If you are having trouble with your appetite, eat what you can. Try eating small, frequent meals throughout the day. ACTIVITY  1. NO DRIVING--you will be evaluated to drive at your follow up visit. 2. Increase your activity by walking several times a day. Stay out of bed most of the day. 3. When sitting, keep your legs elevated. 4. You may ride in a car, but you must get out every hour and walk around. If you ride in a car with an airbag that can not be switched off, put the seat ALL the way back or ride in the back seat. 5. NO LIFTING MORE THAN 5 POUND FOR 1 MONTH, THEN YOU CAN INCREASE TO NO MORE THAN 10 LBS FOR THE 2nd MONTH AND NO MORE THAN 15 LBS FOR THE 3rd MONTH.  YOU WILL NO LONGER HAVE ANY LIFTING RESTRICTIONS AFTER 3 MONTHS. FOLLOW UP  1. Your first follow up appointment will be on 05/24/19 at 11a. Our office is located in [address] [place] [address]. Your second follow up appointment will be in four weeks, on 06/19/19 at 10:30a. Please call our office at [phone] if you are unable to make either one of these appointments. 2. You will be receiving a call before your 5 day appointment to begin cardiac rehab. They are located in the [address] [place] [address] on [facility]. Their phone number is [phone]. Please call if you have not been contacted 2-3 weeks after discharge from the hospital.  3. We will make an appointment with your cardiologist at your last appointment. 4. Consult you primary care physician regarding your influenza & pneumovax vaccines. 5.   Please bring all medications with you to your appointment.     Signature:___________________________________________________

## 2019-05-20 NOTE — PROGRESS NOTES
0730 Bedside and Verbal shift change report given to TRUDY Casillas (oncoming nurse) by Jossie Hart (offgoing nurse). Report included the following information SBAR, Kardex, MAR and Cardiac Rhythm NSR . 0800 Patient up to chair for breakfast 
1045 OT at bedside [address] I have reviewed discharge instructions with the patient and spouse. The patient and spouse verbalized understanding. Discharge medications reviewed with patient and spouse and appropriate educational materials and side effects teaching were provided. Problem: Falls - Risk of 
Goal: *Absence of Falls Description Document Мария Evans Fall Risk and appropriate interventions in the flowsheet. Outcome: Progressing Towards Goal 
  
Problem: Pressure Injury - Risk of 
Goal: *Prevention of pressure injury Description Document Gian Scale and appropriate interventions in the flowsheet. Outcome: Progressing Towards Goal 
  
Problem: CABG: Discharge Outcomes Goal: *Hemodynamically stable Outcome: Progressing Towards Goal 
Goal: *Stable cardiac rhythm Outcome: Progressing Towards Goal 
Goal: *Lungs clear or at baseline Outcome: Progressing Towards Goal 
Goal: *Demonstrates ability to perform prescribed activity without shortness of breath or discomfort Outcome: Progressing Towards Goal 
Goal: *Weight  is stable Outcome: Progressing Towards Goal 
Goal: *No signs and symptoms of infection or wound complications Outcome: Progressing Towards Goal

## 2019-05-20 NOTE — PROGRESS NOTES
CM called and spoke with Marj Weeks with [facility] and agency is aware of discharge today. Patient will need PT/OT orders- CM awaiting to hear from cardiac surgery NP. CM will meet with patient at bedside to discuss transitions of care. MICHELLE Pino 
 
10:27 a.m.- PT/OT orders are in- CM met with the patient at bedside and patient signed IM letter- signed copy placed on chart, additional copy left with the patient at bedside. The patient would like the CM to call his wife- CM will call the patient's spouse. The patient endorses that his wife will be providing support upon discharge. MICHELLE Pino 
 
10:37 a.m.- LARRY called and spoke with the patient's spouse, she endorses she will be providing support at home upon discharge- states she is retired so she will be home with the patient. The patient's spouse will transport home- has questions for the cardiac surgery team, per RN the NP will come visit with wife when she arrives.  MICHELLE Pino

## 2019-05-21 ENCOUNTER — HOME CARE VISIT (OUTPATIENT)
Dept: SCHEDULING | Facility: HOME HEALTH | Age: 74
End: 2019-05-21
Payer: MEDICARE

## 2019-05-21 VITALS
HEART RATE: 73 BPM | RESPIRATION RATE: 16 BRPM | SYSTOLIC BLOOD PRESSURE: 108 MMHG | DIASTOLIC BLOOD PRESSURE: 58 MMHG | TEMPERATURE: 98.3 F | OXYGEN SATURATION: 92 %

## 2019-05-21 PROCEDURE — 3331090001 HH PPS REVENUE CREDIT

## 2019-05-21 PROCEDURE — 3331090002 HH PPS REVENUE DEBIT

## 2019-05-21 PROCEDURE — G0299 HHS/HOSPICE OF RN EA 15 MIN: HCPCS

## 2019-05-21 PROCEDURE — 400013 HH SOC

## 2019-05-22 PROCEDURE — 3331090002 HH PPS REVENUE DEBIT

## 2019-05-22 PROCEDURE — 3331090001 HH PPS REVENUE CREDIT

## 2019-05-23 ENCOUNTER — HOME CARE VISIT (OUTPATIENT)
Dept: SCHEDULING | Facility: HOME HEALTH | Age: 74
End: 2019-05-23
Payer: MEDICARE

## 2019-05-23 VITALS
DIASTOLIC BLOOD PRESSURE: 52 MMHG | TEMPERATURE: 97.6 F | BODY MASS INDEX: 23.96 KG/M2 | RESPIRATION RATE: 16 BRPM | OXYGEN SATURATION: 93 % | HEART RATE: 63 BPM | SYSTOLIC BLOOD PRESSURE: 116 MMHG | WEIGHT: 153 LBS

## 2019-05-23 PROCEDURE — G0299 HHS/HOSPICE OF RN EA 15 MIN: HCPCS

## 2019-05-23 PROCEDURE — G0151 HHCP-SERV OF PT,EA 15 MIN: HCPCS

## 2019-05-23 PROCEDURE — 3331090002 HH PPS REVENUE DEBIT

## 2019-05-23 PROCEDURE — 3331090001 HH PPS REVENUE CREDIT

## 2019-05-24 ENCOUNTER — OFFICE VISIT (OUTPATIENT)
Dept: CARDIOLOGY CLINIC | Age: 74
End: 2019-05-24

## 2019-05-24 ENCOUNTER — TELEPHONE (OUTPATIENT)
Dept: CARDIOLOGY CLINIC | Age: 74
End: 2019-05-24

## 2019-05-24 VITALS
SYSTOLIC BLOOD PRESSURE: 105 MMHG | RESPIRATION RATE: 17 BRPM | HEART RATE: 70 BPM | DIASTOLIC BLOOD PRESSURE: 60 MMHG | TEMPERATURE: 98 F | OXYGEN SATURATION: 98 %

## 2019-05-24 VITALS
HEART RATE: 68 BPM | DIASTOLIC BLOOD PRESSURE: 64 MMHG | HEIGHT: 67 IN | WEIGHT: 153.5 LBS | BODY MASS INDEX: 24.09 KG/M2 | TEMPERATURE: 97.9 F | OXYGEN SATURATION: 97 % | RESPIRATION RATE: 20 BRPM | SYSTOLIC BLOOD PRESSURE: 102 MMHG

## 2019-05-24 DIAGNOSIS — Z95.1 S/P CABG X 3: Primary | ICD-10-CM

## 2019-05-24 PROCEDURE — 3331090002 HH PPS REVENUE DEBIT

## 2019-05-24 PROCEDURE — 3331090001 HH PPS REVENUE CREDIT

## 2019-05-24 NOTE — PROGRESS NOTES
Patient: Salena Lopez   Age: 75 y.o. Patient Care Team:  Tomas Akhtar MD as PCP - General (Family Practice)  Layla Dela Cruz MD (Cardiology)  Emmy Deras MD (Cardiothoracic Surgery)    Diagnosis: The encounter diagnosis was S/P CABG x 3. Problem List:   Patient Active Problem List   Diagnosis Code    Carotid stenosis I65.29    CAD (coronary artery disease) I25.10    S/P CABG x 3 Z95.1        Date of Surgery: 5/14/19    Surgery: CABG x3    HPI: The patient is here for his 5 day fu appointment. The patient arrived in a wheelchair. He has been walking some at home, but feels very weak. He needs assistance with standing. [facility] has been following and working with the patient. He thinks that his strength and appetite might be slightly improved, but for the most part he feels weak and like he has no appetite. He is continuing to lose weight. He is not sleeping well. Current Medications:   Current Outpatient Medications   Medication Sig Dispense Refill    guaiFENesin (MUCINEX) 1,200 mg Ta12 ER tablet Take 1 Tab by mouth every twelve (12) hours. 14 Tab 0    metoprolol tartrate (LOPRESSOR) 25 mg tablet Take 0.5 Tabs by mouth every twelve (12) hours. 60 Tab 1    oxyCODONE-acetaminophen (PERCOCET) 5-325 mg per tablet Take 1 Tab by mouth every six (6) hours as needed for Pain for up to 7 days. Max Daily Amount: 4 Tabs. 40 Tab 0    benzonatate (TESSALON) 100 mg capsule Take 1 Cap by mouth three (3) times daily for 7 days. 21 Cap 0    atorvastatin (LIPITOR) 40 mg tablet Take 40 mg by mouth daily.  montelukast (SINGULAIR) 10 mg tablet Take 10 mg by mouth daily.  fluticasone propionate (ALLER-SUKUMAR NA) by Nasal route two (2) times a day.  tamsulosin (FLOMAX) 0.4 mg capsule Take 0.4 mg by mouth daily.  diphenhydrAMINE (DIPHENHIST) 25 mg capsule Take 25 mg by mouth daily.  At bedtime   Indications: chronic trouble sleeping      albuterol (PROAIR HFA) 90 mcg/actuation inhaler Take 1 Puff by inhalation every four (4) hours as needed for Wheezing.  aspirin 81 mg tablet Take 81 mg by mouth nightly.  senna-docusate (PERICOLACE) 8.6-50 mg per tablet Take 1 Tab by mouth two (2) times a day. 60 Tab 0    polyethylene glycol (MIRALAX) 17 gram packet Take 1 Packet by mouth daily. Take daily until having regular bowel movements. 7 Packet 0    glycopyrrolate-formoterol (BEVESPI AEROSPHERE) 9-4.8 mcg HFAA Take  by inhalation as needed (Wheezing).  pantoprazole (PROTONIX) 40 mg tablet Take 40 mg by mouth daily.  MAGNESIUM PO Take 250 mg by mouth nightly. Vitals: Blood pressure 102/64, pulse 68, temperature 97.9 °F (36.6 °C), temperature source Oral, resp. rate 20, height 5' 7\" (1.702 m), weight 153 lb 8 oz (69.6 kg), SpO2 97 %. Allergies: has No Known Allergies. Physical Exam:  Wounds: clean, dry, no drainage    Lungs: clear upper, crackles in bases to auscultation bilaterally    Heart: regular rate and rhythm, S1, S2 normal, no murmur, click, rub or gallop    Extremities: normal strength, tone, and muscle mass    Assessment/Plan:   1.  CAD s/p CABG, EF 65%: ASA/Statin, BB .     2. Dysphagia/Weight loss/new onset GERD/? Pancreatic mass: MRI negative for pancreatic mass. UGI Series shows small hiatal hernia & no other acute findings. GI recommends outpatient follow up & has signed off. Has been able to swallow foods post-operatively. Poor appetite, trying to find food that is appealing.     3. Hx of R carotid endarectomy: stable     4. Hx of PVD,(L SFA BAP 1/2007, R SFA atherectomy 3/2018): stable     5. COPD:  PRN duo-nebs, steroid nebs. Zrytec, Flonase, nasal azelastine Singulair. Plans to follow up with pulmonary associates.     6. HLD:  Cont statin.       7. BPH:  cont flomax/proscar. Making urine on own.      8. SR w/ 1st deg block: Sinus atiya post op. NSR in 70s now. Cont BB     9. Anticipated acute post op resp insufficiency: Cont IS, activity as tolerated. Pulmonary toileting.      10. Leukocytosis: Likely related to post op state/steroids. Improving. CXR clear. Monitor.      11. Confusion: resolved     12. Constipation: resolved    13. Generalized weakness: Encouraged pt to cont to work on exercises and walking.     14. Dispo: RTC in 2-3 weeks.             Pt is ready to start cardiac rehab.      Rehab - y  Walking: y  Glucometer: n/a  Antibiotic card for valves: n/a

## 2019-05-25 ENCOUNTER — HOME CARE VISIT (OUTPATIENT)
Dept: SCHEDULING | Facility: HOME HEALTH | Age: 74
End: 2019-05-25
Payer: MEDICARE

## 2019-05-25 VITALS
WEIGHT: 152.8 LBS | DIASTOLIC BLOOD PRESSURE: 62 MMHG | RESPIRATION RATE: 20 BRPM | HEART RATE: 72 BPM | SYSTOLIC BLOOD PRESSURE: 112 MMHG | BODY MASS INDEX: 23.93 KG/M2 | OXYGEN SATURATION: 92 % | TEMPERATURE: 97.7 F

## 2019-05-25 PROCEDURE — 3331090002 HH PPS REVENUE DEBIT

## 2019-05-25 PROCEDURE — 3331090001 HH PPS REVENUE CREDIT

## 2019-05-25 PROCEDURE — G0300 HHS/HOSPICE OF LPN EA 15 MIN: HCPCS

## 2019-05-26 PROCEDURE — 3331090002 HH PPS REVENUE DEBIT

## 2019-05-26 PROCEDURE — 3331090001 HH PPS REVENUE CREDIT

## 2019-05-27 ENCOUNTER — HOME CARE VISIT (OUTPATIENT)
Dept: SCHEDULING | Facility: HOME HEALTH | Age: 74
End: 2019-05-27
Payer: MEDICARE

## 2019-05-27 VITALS
OXYGEN SATURATION: 98 % | DIASTOLIC BLOOD PRESSURE: 60 MMHG | SYSTOLIC BLOOD PRESSURE: 110 MMHG | TEMPERATURE: 98 F | HEART RATE: 63 BPM | RESPIRATION RATE: 16 BRPM

## 2019-05-27 PROCEDURE — 3331090002 HH PPS REVENUE DEBIT

## 2019-05-27 PROCEDURE — 3331090001 HH PPS REVENUE CREDIT

## 2019-05-27 PROCEDURE — G0152 HHCP-SERV OF OT,EA 15 MIN: HCPCS

## 2019-05-28 ENCOUNTER — HOME CARE VISIT (OUTPATIENT)
Dept: SCHEDULING | Facility: HOME HEALTH | Age: 74
End: 2019-05-28
Payer: MEDICARE

## 2019-05-28 VITALS
DIASTOLIC BLOOD PRESSURE: 62 MMHG | SYSTOLIC BLOOD PRESSURE: 110 MMHG | TEMPERATURE: 99.4 F | OXYGEN SATURATION: 98 % | WEIGHT: 151 LBS | HEART RATE: 76 BPM | RESPIRATION RATE: 16 BRPM | BODY MASS INDEX: 23.65 KG/M2

## 2019-05-28 VITALS
DIASTOLIC BLOOD PRESSURE: 67 MMHG | TEMPERATURE: 98 F | SYSTOLIC BLOOD PRESSURE: 108 MMHG | RESPIRATION RATE: 17 BRPM | OXYGEN SATURATION: 98 % | HEART RATE: 62 BPM

## 2019-05-28 PROCEDURE — G0151 HHCP-SERV OF PT,EA 15 MIN: HCPCS

## 2019-05-28 PROCEDURE — 3331090002 HH PPS REVENUE DEBIT

## 2019-05-28 PROCEDURE — G0299 HHS/HOSPICE OF RN EA 15 MIN: HCPCS

## 2019-05-28 PROCEDURE — 3331090001 HH PPS REVENUE CREDIT

## 2019-05-28 NOTE — TELEPHONE ENCOUNTER
Wife called. Had + cologuard, recommended to have colonoscopy. Discussed how pt was recommended on having Upper endoscopy while in hospital.  PT still having issues with nutrition, appetite, diarrhea, etc.  Recommended proceeding with GI Follow up and discuss appropriate timing for studies.

## 2019-05-29 PROCEDURE — 3331090001 HH PPS REVENUE CREDIT

## 2019-05-29 PROCEDURE — 3331090002 HH PPS REVENUE DEBIT

## 2019-05-30 ENCOUNTER — HOME CARE VISIT (OUTPATIENT)
Dept: SCHEDULING | Facility: HOME HEALTH | Age: 74
End: 2019-05-30
Payer: MEDICARE

## 2019-05-30 VITALS
SYSTOLIC BLOOD PRESSURE: 105 MMHG | TEMPERATURE: 98 F | HEART RATE: 70 BPM | RESPIRATION RATE: 17 BRPM | OXYGEN SATURATION: 98 % | DIASTOLIC BLOOD PRESSURE: 60 MMHG

## 2019-05-30 PROCEDURE — 3331090001 HH PPS REVENUE CREDIT

## 2019-05-30 PROCEDURE — G0157 HHC PT ASSISTANT EA 15: HCPCS

## 2019-05-30 PROCEDURE — 3331090002 HH PPS REVENUE DEBIT

## 2019-05-31 ENCOUNTER — HOME CARE VISIT (OUTPATIENT)
Dept: HOME HEALTH SERVICES | Facility: HOME HEALTH | Age: 74
End: 2019-05-31
Payer: MEDICARE

## 2019-05-31 PROCEDURE — 3331090002 HH PPS REVENUE DEBIT

## 2019-05-31 PROCEDURE — 3331090001 HH PPS REVENUE CREDIT

## 2019-06-01 ENCOUNTER — HOME CARE VISIT (OUTPATIENT)
Dept: SCHEDULING | Facility: HOME HEALTH | Age: 74
End: 2019-06-01
Payer: MEDICARE

## 2019-06-01 VITALS
TEMPERATURE: 97.8 F | HEART RATE: 78 BPM | OXYGEN SATURATION: 98 % | BODY MASS INDEX: 23.02 KG/M2 | SYSTOLIC BLOOD PRESSURE: 112 MMHG | RESPIRATION RATE: 18 BRPM | DIASTOLIC BLOOD PRESSURE: 68 MMHG | WEIGHT: 147 LBS

## 2019-06-01 PROCEDURE — 3331090001 HH PPS REVENUE CREDIT

## 2019-06-01 PROCEDURE — 3331090002 HH PPS REVENUE DEBIT

## 2019-06-01 PROCEDURE — G0299 HHS/HOSPICE OF RN EA 15 MIN: HCPCS

## 2019-06-02 PROCEDURE — 3331090001 HH PPS REVENUE CREDIT

## 2019-06-02 PROCEDURE — 3331090002 HH PPS REVENUE DEBIT

## 2019-06-03 ENCOUNTER — HOME CARE VISIT (OUTPATIENT)
Dept: SCHEDULING | Facility: HOME HEALTH | Age: 74
End: 2019-06-03
Payer: MEDICARE

## 2019-06-03 VITALS
HEART RATE: 67 BPM | SYSTOLIC BLOOD PRESSURE: 111 MMHG | DIASTOLIC BLOOD PRESSURE: 62 MMHG | OXYGEN SATURATION: 98 % | TEMPERATURE: 98 F

## 2019-06-03 PROCEDURE — 3331090001 HH PPS REVENUE CREDIT

## 2019-06-03 PROCEDURE — 3331090002 HH PPS REVENUE DEBIT

## 2019-06-03 PROCEDURE — G0152 HHCP-SERV OF OT,EA 15 MIN: HCPCS

## 2019-06-04 ENCOUNTER — HOME CARE VISIT (OUTPATIENT)
Dept: SCHEDULING | Facility: HOME HEALTH | Age: 74
End: 2019-06-04
Payer: MEDICARE

## 2019-06-04 VITALS
RESPIRATION RATE: 17 BRPM | OXYGEN SATURATION: 98 % | HEART RATE: 70 BPM | DIASTOLIC BLOOD PRESSURE: 60 MMHG | TEMPERATURE: 98 F | SYSTOLIC BLOOD PRESSURE: 108 MMHG

## 2019-06-04 PROCEDURE — 3331090002 HH PPS REVENUE DEBIT

## 2019-06-04 PROCEDURE — 3331090001 HH PPS REVENUE CREDIT

## 2019-06-04 PROCEDURE — 3331090003 HH PPS REVENUE ADJ

## 2019-06-04 PROCEDURE — G0151 HHCP-SERV OF PT,EA 15 MIN: HCPCS

## 2019-06-12 ENCOUNTER — TELEPHONE (OUTPATIENT)
Dept: CARDIAC REHAB | Age: 74
End: 2019-06-12

## 2019-06-12 NOTE — TELEPHONE ENCOUNTER
6/12/2019 Cardiac Rehab: Called Mr. Shanika Painting to remind of intake appointment on Thursday, 6/13/19. Confirmed appointment with patient. Provided patient with contact information for [facility]. Also, reminded patient to bring a list of current medications, a personal schedule, and to wear comfortable clothes and shoes.  Gregg Cortez

## 2019-06-13 ENCOUNTER — HOSPITAL ENCOUNTER (OUTPATIENT)
Dept: CARDIAC REHAB | Age: 74
End: 2019-06-13
Payer: MEDICARE

## 2019-06-13 ENCOUNTER — HOSPITAL ENCOUNTER (OUTPATIENT)
Dept: CARDIAC REHAB | Age: 74
Discharge: HOME | End: 2019-06-13
Payer: MEDICARE

## 2019-06-13 VITALS — BODY MASS INDEX: 23.83 KG/M2 | WEIGHT: 151.8 LBS | HEIGHT: 67 IN

## 2019-06-13 PROCEDURE — 93798 PHYS/QHP OP CAR RHAB W/ECG: CPT

## 2019-06-13 RX ORDER — LANOLIN ALCOHOL/MO/W.PET/CERES
3-6 CREAM (GRAM) TOPICAL
COMMUNITY

## 2019-06-13 NOTE — CARDIO/PULMONARY
Lisa Guzmán  75 y.o. presented to cardiac wellness for orientation and exercise tolerance test today with a primary diagnosis of CABG x 3 on 5/14/19. EF is 56%. Lisa Guzmán has a history of COPD. Cardiac risk factors include smoking/ tobacco exposure, family history, dyslipidemia, stress and these were reviewed with him. [address] Loida Tinoco lives with his wife and is retired. He has 2 adult sons. PHQ9, depression score, is 15 and this is considered high. The result was discussed with patient who affirms score to be accurate and agreed when I asked his permission to notify his PCP. A fax was sent to Dr. Adrian Van regarding the [facility] score and the patient's stated unexpected weight loss of 27lbs in the past 3 months. I advised Mr. Loida Tinoco to make an appointment to see Dr. Tico Nielsen as soon as possible. Patient denied chest pain or SOB during 6 minute walk and was in SR without ectopy. Lisa Guzmán will attend a 60 minute class once a week and exercise 2 days a week in cardiac wellness. Education manual given and reviewed briefly. Exceptions noted during intake include: pt. Has a productive cough for 3 months. He is regularly seen by Dr. Benjamine Cogan for COPD and has an appointment next week.       Clement Gaines RN  6/13/2019

## 2019-06-17 ENCOUNTER — TELEPHONE (OUTPATIENT)
Dept: CARDIOLOGY CLINIC | Age: 74
End: 2019-06-17

## 2019-06-17 ENCOUNTER — HOSPITAL ENCOUNTER (OUTPATIENT)
Dept: GENERAL RADIOLOGY | Age: 74
Discharge: HOME | End: 2019-06-17
Payer: MEDICARE

## 2019-06-17 DIAGNOSIS — R05.9 COUGH: ICD-10-CM

## 2019-06-17 PROCEDURE — 71046 X-RAY EXAM CHEST 2 VIEWS: CPT

## 2019-06-17 NOTE — TELEPHONE ENCOUNTER
Placed on prednisone, doxycycline for COPD exacerbation by PCP. Also sent for PA/lateral, improved aeration. Will see on Wednesday 6/19/19.

## 2019-06-19 ENCOUNTER — OFFICE VISIT (OUTPATIENT)
Dept: CARDIOLOGY CLINIC | Age: 74
End: 2019-06-19

## 2019-06-19 VITALS
HEIGHT: 67 IN | OXYGEN SATURATION: 94 % | BODY MASS INDEX: 24.01 KG/M2 | WEIGHT: 153 LBS | DIASTOLIC BLOOD PRESSURE: 54 MMHG | TEMPERATURE: 97.6 F | SYSTOLIC BLOOD PRESSURE: 110 MMHG | HEART RATE: 60 BPM | RESPIRATION RATE: 16 BRPM

## 2019-06-19 DIAGNOSIS — Z95.1 S/P CABG X 3: Primary | ICD-10-CM

## 2019-06-19 RX ORDER — IPRATROPIUM BROMIDE AND ALBUTEROL SULFATE 2.5; .5 MG/3ML; MG/3ML
3 SOLUTION RESPIRATORY (INHALATION)
COMMUNITY
End: 2019-08-27

## 2019-06-19 RX ORDER — PREDNISONE 10 MG/1
10 TABLET ORAL 2 TIMES DAILY
COMMUNITY
End: 2019-08-27

## 2019-06-19 RX ORDER — DOXYCYCLINE 100 MG/1
100 TABLET ORAL 2 TIMES DAILY
COMMUNITY
End: 2019-08-27

## 2019-06-19 NOTE — PROGRESS NOTES
Patient: Kaitlynn Chaparro   Age: 75 y.o. Patient Care Team:  Nima Whitley MD as PCP - General ([facility])  Asha Vela MD (Cardiology)  Marj Ibarra MD (Cardiothoracic Surgery)    Diagnosis: The encounter diagnosis was S/P CABG x 3. Problem List:   Patient Active Problem List   Diagnosis Code    Carotid stenosis I65.29    CAD (coronary artery disease) I25.10    S/P CABG x 3 Z95.1        Date of Surgery: 05/14/19    Surgery: CABG x 3    HPI: Pt presents for routine 1 month appointment. Reports improved energy, but continues to have his cough. Presented to his PCP and was started on prednisone and doxycycline. Received CXR that showed small bilateral pleural effusions but improvement of aeration of left lung base. Denies any chest pain (other than with coughing), SOB, dizziness, or syncope. Denies cigarette smoking since prior to the operation. Has been walking more and sleeping better. States that his appetite has improved but has lost ~10 lbs since the surgery. Current Medications:   Current Outpatient Medications   Medication Sig Dispense Refill    doxycycline (ADOXA) 100 mg tablet Take 100 mg by mouth two (2) times a day. Indications: BID for 5 days then daily for 5 days      predniSONE (DELTASONE) 10 mg tablet Take 10 mg by mouth two (2) times a day. Indications: for 10 days      albuterol-ipratropium (DUO-NEB) 2.5 mg-0.5 mg/3 ml nebu 3 mL by Nebulization route every four (4) hours as needed.  melatonin 3 mg tablet Take 3 mg by mouth as needed.  loperamide (IMODIUM) 2 mg capsule Take 2 mg by mouth as needed for Diarrhea.  guaiFENesin (MUCINEX) 1,200 mg Ta12 ER tablet Take 1 Tab by mouth every twelve (12) hours. 14 Tab 0    metoprolol tartrate (LOPRESSOR) 25 mg tablet Take 0.5 Tabs by mouth every twelve (12) hours. 60 Tab 1    glycopyrrolate-formoterol (BEVESPI AEROSPHERE) 9-4.8 mcg HFAA Take 120 Inhalation by inhalation as needed (Wheezing).  Take two inhalations by mouth twice daily as needed for wheezing      atorvastatin (LIPITOR) 40 mg tablet Take 40 mg by mouth daily.  montelukast (SINGULAIR) 10 mg tablet Take 10 mg by mouth daily.  fluticasone propionate (ALLER-SUKUMAR NA) 50 mcg by Nasal route two (2) times a day. One spray pump by nasal route twice a day.  tamsulosin (FLOMAX) 0.4 mg capsule Take 0.4 mg by mouth daily.  diphenhydrAMINE (DIPHENHIST) 25 mg capsule Take 25 mg by mouth daily. At bedtime   Indications: chronic trouble sleeping      albuterol (PROAIR HFA) 90 mcg/actuation inhaler Take 1 Puff by inhalation every four (4) hours as needed for Wheezing.  aspirin 81 mg tablet Take 81 mg by mouth nightly.  senna-docusate (PERICOLACE) 8.6-50 mg per tablet Take 1 Tab by mouth two (2) times a day. 60 Tab 0    polyethylene glycol (MIRALAX) 17 gram packet Take 1 Packet by mouth daily. Take daily until having regular bowel movements. 7 Packet 0       Vitals: Blood pressure 110/54, pulse 60, temperature 97.6 °F (36.4 °C), temperature source Oral, resp. rate 16, height 5' 7\" (1.702 m), weight 153 lb (69.4 kg), SpO2 94 %. Allergies: has No Known Allergies. Physical Exam:  Wounds: clean, dry, no drainage    Lungs: slight crackles in bases bilaterally    Heart: regular rate and rhythm, S1, S2 normal, no murmur, click, rub or gallop    Extremities: normal strength, tone, and muscle mass    Assessment/Plan:   1.  CAD s/p CABG, EF 65%: ASA, Statin, BB. Maintain weight restrictions as instructed. May resume driving. Increase activities as tolerated. Scheduled to begin cardiac rehab.     2. Dysphagia/Weight loss/new onset GERD/? Pancreatic mass: MRI negative for pancreatic mass. UGI Series shows small hiatal hernia & no other acute findings. GI recommends outpatient follow up & has signed off. Has been able to swallow foods post-operatively.  Appetite improving, follow up with PCP.     3. Hx of R carotid endarectomy: On ASA, stable     4. Hx of PVD,(L SFA BAP 1/2007, R SFA atherectomy 3/2018): On ASA, stable     5. COPD/chronic cough: Prednisone/doxy per PCP. Zrytec, Flonase, Singulair, duo-neb, Bevespi, mucinex. Plans to follow up with pulmonary in 2 days. Encouraged continued smoking cessation.     6. HLD: Cont statin.       7. BPH: cont flomax/proscar. Making urine on own.      8. SR w/ 1st deg block: Sinus atiya post op. NSR in 60s-70s now. Cont BB     9. Anticipated acute post op resp insufficiency: Cont IS, activity as tolerated. Pulmonary toileting.      10. Leukocytosis: Likely related to post op state/steroids. Improving. CXR clear. Monitor.      11. Confusion: resolved     12. Constipation: resolved     13. Generalized weakness: Encouraged pt to cont to work on exercises and walking.     14. F/u cardiology/PCP/pulmonology    Pt is ready to start cardiac rehab.      Rehab - y  Walking: y  Glucometer: n/a  Antibiotic card for valves: n/a

## 2019-06-20 ENCOUNTER — HOSPITAL ENCOUNTER (OUTPATIENT)
Dept: CARDIAC REHAB | Age: 74
Discharge: HOME | End: 2019-06-20
Payer: MEDICARE

## 2019-06-20 VITALS — BODY MASS INDEX: 24.31 KG/M2 | WEIGHT: 155.2 LBS

## 2019-06-20 PROCEDURE — 93797 PHYS/QHP OP CAR RHAB WO ECG: CPT

## 2019-06-20 PROCEDURE — 93798 PHYS/QHP OP CAR RHAB W/ECG: CPT

## 2019-06-24 ENCOUNTER — HOSPITAL ENCOUNTER (OUTPATIENT)
Dept: CARDIAC REHAB | Age: 74
Discharge: HOME | End: 2019-06-24
Payer: MEDICARE

## 2019-06-24 VITALS — BODY MASS INDEX: 23.99 KG/M2 | WEIGHT: 153.2 LBS

## 2019-06-24 PROCEDURE — 93797 PHYS/QHP OP CAR RHAB WO ECG: CPT | Performed by: DIETITIAN, REGISTERED

## 2019-06-24 PROCEDURE — 93798 PHYS/QHP OP CAR RHAB W/ECG: CPT

## 2019-06-24 NOTE — PROGRESS NOTES
Cardiac Rehab Nutrition Assessment - 1:1 Evaluation           NAME: Padmini Miguel : 1945 AGE: 75 y.o. GENDER: male  CARDIAC REHAB ADMITTING DIAGNOSIS: CABG x 3 (19)    Relevant Comorbidites: dyslipidemia and COPD; quit smoking at time of CABG        LABS:   Lab Results   Component Value Date/Time    Hemoglobin A1c 5.6 2019 02:33 PM     No results found for: CHOL, CHOLPOCT, CHOLX, CHLST, CHOLV, HDL, HDLPOC, LDL, LDLCPOC, LDLC, DLDLP, VLDLC, VLDL, TGLX, TRIGL, TRIGP, TGLPOCT, CHHD, CHHDX      MEDICATIONS/SUPPLEMENTS:   [unfilled]  Prior to Admission medications    Medication Sig Start Date End Date Taking? Authorizing Provider   doxycycline (ADOXA) 100 mg tablet Take 100 mg by mouth two (2) times a day. Indications: BID for 5 days then daily for 5 days    Provider, Historical   predniSONE (DELTASONE) 10 mg tablet Take 10 mg by mouth two (2) times a day. Indications: for 10 days    Provider, Historical   albuterol-ipratropium (DUO-NEB) 2.5 mg-0.5 mg/3 ml nebu 3 mL by Nebulization route every four (4) hours as needed. Provider, Historical   melatonin 3 mg tablet Take 3 mg by mouth as needed. Provider, Historical   loperamide (IMODIUM) 2 mg capsule Take 2 mg by mouth as needed for Diarrhea. Provider, Historical   guaiFENesin (MUCINEX) 1,200 mg Ta12 ER tablet Take 1 Tab by mouth every twelve (12) hours. 19   Ebony Og NP   metoprolol tartrate (LOPRESSOR) 25 mg tablet Take 0.5 Tabs by mouth every twelve (12) hours. 19   Ebony Og NP   senna-docusate (PERICOLACE) 8.6-50 mg per tablet Take 1 Tab by mouth two (2) times a day. 19   Ebony Og NP   polyethylene glycol (MIRALAX) 17 gram packet Take 1 Packet by mouth daily. Take daily until having regular bowel movements. 19   Ebony Og NP   glycopyrrolate-formoterol (BEVESPI AEROSPHERE) 9-4.8 mcg HFAA Take 120 Inhalation by inhalation as needed (Wheezing).  Take two inhalations by mouth twice daily as needed for wheezing    Provider, Historical   atorvastatin (LIPITOR) 40 mg tablet Take 40 mg by mouth daily. Provider, Historical   montelukast (SINGULAIR) 10 mg tablet Take 10 mg by mouth daily. Provider, Historical   fluticasone propionate (ALLER-SUKUMAR NA) 50 mcg by Nasal route two (2) times a day. One spray pump by nasal route twice a day. Provider, Historical   tamsulosin (FLOMAX) 0.4 mg capsule Take 0.4 mg by mouth daily. Provider, Historical   diphenhydrAMINE (DIPHENHIST) 25 mg capsule Take 25 mg by mouth daily. At bedtime   Indications: chronic trouble sleeping    Provider, Historical   albuterol (PROAIR HFA) 90 mcg/actuation inhaler Take 1 Puff by inhalation every four (4) hours as needed for Wheezing. Provider, Historical   aspirin 81 mg tablet Take 81 mg by mouth nightly. Provider, Historical           ANTHROPOMETRICS:    Ht Readings from Last 1 Encounters:   06/19/19 5' 7\" (1.702 m)      Wt Readings from Last 1 Encounters:   06/24/19 69.5 kg (153 lb 3.2 oz)      IBW:148 # +/- 10%  %IBW:103 % +/- 10%    BMI: 23.8 kg/M2 Category: Normal  Waist: 36 inches    Reported Wt Hx: Approximate 30 lbs unplanned weight loss over the past 3 months - 20 lbs prior to CABG related to poor appetite of undetermined etiology; 10 lbs post CABG related to poor appetite. Estimated Nutritional Needs: 1868-7286 calories / day  (using [address] with AF 1.3 and adding 200-500 calories for desired weight gain )    Reported Diet Hx:    Reports has been \"pigging out\" and eating \"whatever\" in an effort to regain lost weight. States appetite somewhat improved. Currently on short course of steroids.     Rate Your Plate Score: 47  (Score 58-72: Making many healthy choices; 41-57: Some choices need improving 24-40: many choices need improving)       Environmental/Social: retired; supportive wife present for consult        NUTRITION INTERVENTION:  Nutrition 60 minute one-on-one education & goal setting with Trevor Robles    Reviewed with Trevor Robles relevant labs compared to ideals. Reviewed weight history and patient's verbalized weight goal as well as any real or perceived barriers to obtaining the goal. Collaborated with patient to set a specific short and long term weight goal.     Reviewed Rate Your Plate and conducted a verbal diet recall. Assessed for environmental, financial, psychosocial, physical and comorbidities that may impact the food and eating patterns / behaviors of Trevor Robles    Collaborated with patient to set specific nutrient goals as well as specific food / behavior changes that will help patient meet the overall goal of following a heart healthy eating pattern (using guidelines as set forth by the American Heart Association and modeled after healthful eating patterns as recognized by the USDA Dietary Guidelines such as DASH, Mediterranean or plant-based). Briefly reviewed with Trevor Robles the nutrition information in the Cardiac Rehab patient education book and encouraged Trevor Robles to read thoroughly, ask questions as needed, and use for future reference for heart healthy nutrition information. Trevor Robles is scheduled to participate in Cardiac Rehab group nutrition classes. PATIENT GOALS:    Weight Goals:  Short Term Weight Goal:160-165 lbs  Long Term Weight Goal:165 lbs    Nutrition Goals:  Daily Recommendations:  Calories: 0213-7269 /day  Saturated Fat: </=13-15g/day  Trans Fat: 0 g/day  Sodium: </=2400 mg/day  Fruit: 3+ cups / day  Vegetables: 3+ cups/day    Other:  Include a source of protein at each meal (lean meats, reduced fat dairy, legumes or nuts / nut butters)    Increase calories with heart healthy calorie-dense choices such as plant oils, nut butters / nuts, avocado, dried fruit and 100% fruit juice as well as eating snacks between meals    Keeping a food diary was recommended. Questions addressed. Follow-up plans discussed.  Trevor Robles verbalized understanding.             Rika Veloz RD

## 2019-06-25 ENCOUNTER — HOSPITAL ENCOUNTER (OUTPATIENT)
Dept: CARDIAC REHAB | Age: 74
Discharge: HOME | End: 2019-06-25
Payer: MEDICARE

## 2019-06-25 VITALS — BODY MASS INDEX: 24.12 KG/M2 | WEIGHT: 154 LBS

## 2019-06-25 PROCEDURE — 93798 PHYS/QHP OP CAR RHAB W/ECG: CPT

## 2019-06-27 ENCOUNTER — HOSPITAL ENCOUNTER (OUTPATIENT)
Dept: CARDIAC REHAB | Age: 74
Discharge: HOME | End: 2019-06-27
Payer: MEDICARE

## 2019-06-27 VITALS — BODY MASS INDEX: 24.34 KG/M2 | WEIGHT: 155.4 LBS

## 2019-06-27 PROCEDURE — 93797 PHYS/QHP OP CAR RHAB WO ECG: CPT | Performed by: DIETITIAN, REGISTERED

## 2019-06-27 PROCEDURE — 93798 PHYS/QHP OP CAR RHAB W/ECG: CPT

## 2019-07-01 ENCOUNTER — HOSPITAL ENCOUNTER (OUTPATIENT)
Dept: CARDIAC REHAB | Age: 74
Discharge: HOME | End: 2019-07-01
Payer: MEDICARE

## 2019-07-01 VITALS — BODY MASS INDEX: 24.5 KG/M2 | WEIGHT: 156.4 LBS

## 2019-07-01 PROCEDURE — 93798 PHYS/QHP OP CAR RHAB W/ECG: CPT

## 2019-07-03 ENCOUNTER — HOSPITAL ENCOUNTER (OUTPATIENT)
Dept: CARDIAC REHAB | Age: 74
Discharge: HOME | End: 2019-07-03
Payer: MEDICARE

## 2019-07-03 VITALS — BODY MASS INDEX: 23.68 KG/M2 | WEIGHT: 151.2 LBS

## 2019-07-03 PROCEDURE — 93798 PHYS/QHP OP CAR RHAB W/ECG: CPT

## 2019-07-08 ENCOUNTER — HOSPITAL ENCOUNTER (OUTPATIENT)
Dept: CARDIAC REHAB | Age: 74
Discharge: HOME | End: 2019-07-08
Payer: MEDICARE

## 2019-07-08 VITALS — WEIGHT: 155.6 LBS | BODY MASS INDEX: 24.37 KG/M2

## 2019-07-08 PROCEDURE — 93798 PHYS/QHP OP CAR RHAB W/ECG: CPT

## 2019-07-09 ENCOUNTER — HOSPITAL ENCOUNTER (OUTPATIENT)
Dept: CARDIAC REHAB | Age: 74
Discharge: HOME | End: 2019-07-09
Payer: MEDICARE

## 2019-07-09 VITALS — BODY MASS INDEX: 24.46 KG/M2 | WEIGHT: 156.2 LBS

## 2019-07-09 PROCEDURE — 93798 PHYS/QHP OP CAR RHAB W/ECG: CPT

## 2019-07-11 ENCOUNTER — HOSPITAL ENCOUNTER (OUTPATIENT)
Dept: CARDIAC REHAB | Age: 74
Discharge: HOME | End: 2019-07-11
Payer: MEDICARE

## 2019-07-11 VITALS — BODY MASS INDEX: 24.59 KG/M2 | WEIGHT: 157 LBS

## 2019-07-11 PROCEDURE — 93798 PHYS/QHP OP CAR RHAB W/ECG: CPT

## 2019-07-11 PROCEDURE — 93797 PHYS/QHP OP CAR RHAB WO ECG: CPT | Performed by: DIETITIAN, REGISTERED

## 2019-07-15 ENCOUNTER — HOSPITAL ENCOUNTER (OUTPATIENT)
Dept: CARDIAC REHAB | Age: 74
Discharge: HOME | End: 2019-07-15
Payer: MEDICARE

## 2019-07-15 VITALS — WEIGHT: 157 LBS | BODY MASS INDEX: 24.59 KG/M2

## 2019-07-15 PROCEDURE — 93798 PHYS/QHP OP CAR RHAB W/ECG: CPT

## 2019-07-18 ENCOUNTER — HOSPITAL ENCOUNTER (OUTPATIENT)
Dept: CARDIAC REHAB | Age: 74
Discharge: HOME | End: 2019-07-18
Payer: MEDICARE

## 2019-07-18 VITALS — WEIGHT: 156.4 LBS | BODY MASS INDEX: 24.5 KG/M2

## 2019-07-18 PROCEDURE — 93798 PHYS/QHP OP CAR RHAB W/ECG: CPT

## 2019-07-22 ENCOUNTER — HOSPITAL ENCOUNTER (OUTPATIENT)
Dept: CARDIAC REHAB | Age: 74
Discharge: HOME | End: 2019-07-22
Payer: MEDICARE

## 2019-07-22 VITALS — WEIGHT: 156.8 LBS | BODY MASS INDEX: 24.56 KG/M2

## 2019-07-22 PROCEDURE — 93798 PHYS/QHP OP CAR RHAB W/ECG: CPT

## 2019-07-23 ENCOUNTER — HOSPITAL ENCOUNTER (OUTPATIENT)
Dept: CARDIAC REHAB | Age: 74
Discharge: HOME | End: 2019-07-23
Payer: MEDICARE

## 2019-07-23 VITALS — WEIGHT: 156.8 LBS | BODY MASS INDEX: 24.56 KG/M2

## 2019-07-23 PROCEDURE — 93798 PHYS/QHP OP CAR RHAB W/ECG: CPT

## 2019-07-25 ENCOUNTER — HOSPITAL ENCOUNTER (OUTPATIENT)
Dept: CARDIAC REHAB | Age: 74
Discharge: HOME | End: 2019-07-25
Payer: MEDICARE

## 2019-07-25 VITALS — BODY MASS INDEX: 24.81 KG/M2 | WEIGHT: 158.4 LBS

## 2019-07-25 PROCEDURE — 93798 PHYS/QHP OP CAR RHAB W/ECG: CPT

## 2019-07-25 PROCEDURE — 93797 PHYS/QHP OP CAR RHAB WO ECG: CPT | Performed by: DIETITIAN, REGISTERED

## 2019-07-29 ENCOUNTER — HOSPITAL ENCOUNTER (OUTPATIENT)
Dept: CARDIAC REHAB | Age: 74
Discharge: HOME | End: 2019-07-29
Payer: MEDICARE

## 2019-07-29 VITALS — WEIGHT: 160.6 LBS | BODY MASS INDEX: 25.15 KG/M2

## 2019-07-29 PROCEDURE — 93798 PHYS/QHP OP CAR RHAB W/ECG: CPT

## 2019-07-31 ENCOUNTER — HOSPITAL ENCOUNTER (OUTPATIENT)
Dept: CARDIAC REHAB | Age: 74
Discharge: HOME | End: 2019-07-31
Payer: MEDICARE

## 2019-07-31 VITALS — WEIGHT: 158.8 LBS | BODY MASS INDEX: 24.87 KG/M2

## 2019-07-31 PROCEDURE — 93798 PHYS/QHP OP CAR RHAB W/ECG: CPT

## 2019-08-01 ENCOUNTER — HOSPITAL ENCOUNTER (OUTPATIENT)
Dept: CARDIAC REHAB | Age: 74
Discharge: HOME | End: 2019-08-01
Payer: MEDICARE

## 2019-08-01 VITALS — WEIGHT: 158 LBS | BODY MASS INDEX: 24.75 KG/M2

## 2019-08-01 PROCEDURE — 93798 PHYS/QHP OP CAR RHAB W/ECG: CPT

## 2019-08-01 PROCEDURE — 93797 PHYS/QHP OP CAR RHAB WO ECG: CPT | Performed by: DIETITIAN, REGISTERED

## 2019-08-05 ENCOUNTER — HOSPITAL ENCOUNTER (OUTPATIENT)
Dept: CARDIAC REHAB | Age: 74
Discharge: HOME | End: 2019-08-05
Payer: MEDICARE

## 2019-08-05 VITALS — WEIGHT: 159.4 LBS | BODY MASS INDEX: 24.97 KG/M2

## 2019-08-05 PROCEDURE — 93798 PHYS/QHP OP CAR RHAB W/ECG: CPT

## 2019-08-08 ENCOUNTER — HOSPITAL ENCOUNTER (OUTPATIENT)
Dept: CARDIAC REHAB | Age: 74
Discharge: HOME | End: 2019-08-08
Payer: MEDICARE

## 2019-08-08 VITALS — WEIGHT: 159 LBS | BODY MASS INDEX: 24.9 KG/M2

## 2019-08-08 PROCEDURE — 93798 PHYS/QHP OP CAR RHAB W/ECG: CPT

## 2019-08-08 PROCEDURE — 93797 PHYS/QHP OP CAR RHAB WO ECG: CPT

## 2019-08-12 ENCOUNTER — APPOINTMENT (OUTPATIENT)
Dept: CARDIAC REHAB | Age: 74
End: 2019-08-12
Payer: MEDICARE

## 2019-08-15 ENCOUNTER — APPOINTMENT (OUTPATIENT)
Dept: CARDIAC REHAB | Age: 74
End: 2019-08-15
Payer: MEDICARE

## 2019-08-19 ENCOUNTER — HOSPITAL ENCOUNTER (OUTPATIENT)
Dept: CARDIAC REHAB | Age: 74
Discharge: HOME | End: 2019-08-19
Payer: MEDICARE

## 2019-08-19 VITALS — WEIGHT: 158 LBS | BODY MASS INDEX: 24.75 KG/M2

## 2019-08-19 PROCEDURE — 93798 PHYS/QHP OP CAR RHAB W/ECG: CPT

## 2019-08-22 ENCOUNTER — APPOINTMENT (OUTPATIENT)
Dept: CARDIAC REHAB | Age: 74
End: 2019-08-22
Payer: MEDICARE

## 2019-08-22 ENCOUNTER — HOSPITAL ENCOUNTER (OUTPATIENT)
Dept: CARDIAC REHAB | Age: 74
Discharge: HOME | End: 2019-08-22
Payer: MEDICARE

## 2019-08-22 VITALS — BODY MASS INDEX: 24.97 KG/M2 | WEIGHT: 159.4 LBS

## 2019-08-22 PROCEDURE — 93798 PHYS/QHP OP CAR RHAB W/ECG: CPT

## 2019-08-26 ENCOUNTER — HOSPITAL ENCOUNTER (OUTPATIENT)
Dept: CARDIAC REHAB | Age: 74
Discharge: HOME | End: 2019-08-26
Payer: MEDICARE

## 2019-08-26 VITALS — WEIGHT: 158 LBS | BODY MASS INDEX: 24.75 KG/M2

## 2019-08-26 PROCEDURE — 93798 PHYS/QHP OP CAR RHAB W/ECG: CPT

## 2019-08-27 RX ORDER — DIPHENHYDRAMINE HCL 25 MG
25 CAPSULE ORAL
Status: ON HOLD | COMMUNITY
End: 2022-07-14

## 2019-08-27 RX ORDER — FLUTICASONE FUROATE AND VILANTEROL 200; 25 UG/1; UG/1
1 POWDER RESPIRATORY (INHALATION) DAILY
COMMUNITY

## 2019-08-28 ENCOUNTER — HOSPITAL ENCOUNTER (OUTPATIENT)
Age: 74
Setting detail: OUTPATIENT SURGERY
Discharge: HOME | End: 2019-08-28
Attending: INTERNAL MEDICINE | Admitting: INTERNAL MEDICINE
Payer: MEDICARE

## 2019-08-28 ENCOUNTER — ANESTHESIA (OUTPATIENT)
Dept: ENDOSCOPY | Age: 74
End: 2019-08-28
Payer: MEDICARE

## 2019-08-28 ENCOUNTER — ANESTHESIA EVENT (OUTPATIENT)
Dept: ENDOSCOPY | Age: 74
End: 2019-08-28
Payer: MEDICARE

## 2019-08-28 VITALS
SYSTOLIC BLOOD PRESSURE: 128 MMHG | TEMPERATURE: 98 F | OXYGEN SATURATION: 99 % | HEIGHT: 67 IN | RESPIRATION RATE: 20 BRPM | BODY MASS INDEX: 24.8 KG/M2 | DIASTOLIC BLOOD PRESSURE: 61 MMHG | WEIGHT: 158 LBS | HEART RATE: 58 BPM

## 2019-08-28 PROCEDURE — 76060000031 HC ANESTHESIA FIRST 0.5 HR: Performed by: INTERNAL MEDICINE

## 2019-08-28 PROCEDURE — 74011250636 HC RX REV CODE- 250/636: Performed by: NURSE ANESTHETIST, CERTIFIED REGISTERED

## 2019-08-28 PROCEDURE — 76040000019: Performed by: INTERNAL MEDICINE

## 2019-08-28 PROCEDURE — 74011250637 HC RX REV CODE- 250/637: Performed by: INTERNAL MEDICINE

## 2019-08-28 PROCEDURE — 77030009426 HC FCPS BIOP ENDOSC BSC -B: Performed by: INTERNAL MEDICINE

## 2019-08-28 PROCEDURE — 88305 TISSUE EXAM BY PATHOLOGIST: CPT

## 2019-08-28 PROCEDURE — 77030013992 HC SNR POLYP ENDOSC BSC -B: Performed by: INTERNAL MEDICINE

## 2019-08-28 RX ORDER — EPINEPHRINE 0.1 MG/ML
1 INJECTION INTRACARDIAC; INTRAVENOUS
Status: DISCONTINUED | OUTPATIENT
Start: 2019-08-28 | End: 2019-08-28 | Stop reason: HOSPADM

## 2019-08-28 RX ORDER — SODIUM CHLORIDE 9 MG/ML
INJECTION, SOLUTION INTRAVENOUS
Status: DISCONTINUED | OUTPATIENT
Start: 2019-08-28 | End: 2019-08-28 | Stop reason: HOSPADM

## 2019-08-28 RX ORDER — MIDAZOLAM HYDROCHLORIDE 1 MG/ML
.25-5 INJECTION, SOLUTION INTRAMUSCULAR; INTRAVENOUS
Status: DISCONTINUED | OUTPATIENT
Start: 2019-08-28 | End: 2019-08-28 | Stop reason: HOSPADM

## 2019-08-28 RX ORDER — PROPOFOL 10 MG/ML
INJECTION, EMULSION INTRAVENOUS AS NEEDED
Status: DISCONTINUED | OUTPATIENT
Start: 2019-08-28 | End: 2019-08-28 | Stop reason: HOSPADM

## 2019-08-28 RX ORDER — SODIUM CHLORIDE 0.9 % (FLUSH) 0.9 %
5-40 SYRINGE (ML) INJECTION EVERY 8 HOURS
Status: DISCONTINUED | OUTPATIENT
Start: 2019-08-28 | End: 2019-08-28 | Stop reason: HOSPADM

## 2019-08-28 RX ORDER — LIDOCAINE HYDROCHLORIDE 20 MG/ML
INJECTION, SOLUTION EPIDURAL; INFILTRATION; INTRACAUDAL; PERINEURAL AS NEEDED
Status: DISCONTINUED | OUTPATIENT
Start: 2019-08-28 | End: 2019-08-28 | Stop reason: HOSPADM

## 2019-08-28 RX ORDER — ATROPINE SULFATE 0.1 MG/ML
0.5 INJECTION INTRAVENOUS
Status: DISCONTINUED | OUTPATIENT
Start: 2019-08-28 | End: 2019-08-28 | Stop reason: HOSPADM

## 2019-08-28 RX ORDER — SODIUM CHLORIDE 9 MG/ML
50 INJECTION, SOLUTION INTRAVENOUS CONTINUOUS
Status: DISCONTINUED | OUTPATIENT
Start: 2019-08-28 | End: 2019-08-28 | Stop reason: HOSPADM

## 2019-08-28 RX ORDER — DEXTROMETHORPHAN/PSEUDOEPHED 2.5-7.5/.8
1.2 DROPS ORAL
Status: DISCONTINUED | OUTPATIENT
Start: 2019-08-28 | End: 2019-08-28 | Stop reason: HOSPADM

## 2019-08-28 RX ORDER — FLUMAZENIL 0.1 MG/ML
0.2 INJECTION INTRAVENOUS
Status: DISCONTINUED | OUTPATIENT
Start: 2019-08-28 | End: 2019-08-28 | Stop reason: HOSPADM

## 2019-08-28 RX ORDER — NALOXONE HYDROCHLORIDE 0.4 MG/ML
0.4 INJECTION, SOLUTION INTRAMUSCULAR; INTRAVENOUS; SUBCUTANEOUS
Status: DISCONTINUED | OUTPATIENT
Start: 2019-08-28 | End: 2019-08-28 | Stop reason: HOSPADM

## 2019-08-28 RX ORDER — SODIUM CHLORIDE 0.9 % (FLUSH) 0.9 %
5-40 SYRINGE (ML) INJECTION AS NEEDED
Status: DISCONTINUED | OUTPATIENT
Start: 2019-08-28 | End: 2019-08-28 | Stop reason: HOSPADM

## 2019-08-28 RX ORDER — FENTANYL CITRATE 50 UG/ML
25-200 INJECTION, SOLUTION INTRAMUSCULAR; INTRAVENOUS
Status: DISCONTINUED | OUTPATIENT
Start: 2019-08-28 | End: 2019-08-28 | Stop reason: HOSPADM

## 2019-08-28 RX ADMIN — PROPOFOL 20 MG: 10 INJECTION, EMULSION INTRAVENOUS at 09:06

## 2019-08-28 RX ADMIN — PROPOFOL 50 MG: 10 INJECTION, EMULSION INTRAVENOUS at 09:00

## 2019-08-28 RX ADMIN — LIDOCAINE HYDROCHLORIDE 100 MG: 20 INJECTION, SOLUTION EPIDURAL; INFILTRATION; INTRACAUDAL; PERINEURAL at 09:00

## 2019-08-28 RX ADMIN — PROPOFOL 20 MG: 10 INJECTION, EMULSION INTRAVENOUS at 09:08

## 2019-08-28 RX ADMIN — PROPOFOL 20 MG: 10 INJECTION, EMULSION INTRAVENOUS at 09:10

## 2019-08-28 RX ADMIN — SODIUM CHLORIDE: 900 INJECTION, SOLUTION INTRAVENOUS at 08:43

## 2019-08-28 RX ADMIN — PROPOFOL 20 MG: 10 INJECTION, EMULSION INTRAVENOUS at 09:12

## 2019-08-28 RX ADMIN — PROPOFOL 20 MG: 10 INJECTION, EMULSION INTRAVENOUS at 09:01

## 2019-08-28 NOTE — H&P
The patient is a 75year old female who presents with a complaint of Hemoccult Positive Stool. The patient presents due to new symptoms. The symptoms have been associated with heartburn, while the symptoms have not been associated with abdominal pain, change in bowel habits, change in caliber of stools, dizziness, easy bruising, family history of bleeding, family history of colon cancer, hard stools, hematemesis, ingestion of beets, ingestion of bismuth, ingestion of iron pills, mucus or pus with stool, nausea, painful bowel movements, use of adrenal steroids, use of aspirin, use of indomethacin, use of anti-coagulants, vomiting, weight loss, constipation, diarrhea, straining on bowel movements, hematochezia, history of gastrointestinal bleeding, ulcer disease, use of NSAIDs, previous gastrointestinal bleeding or history of anemia. Note for \"Hemoccult Positive Stool\": i met him before his CABG last may at [facility], got consulted for cough which resolved. he never had colonoscopy done before, tested positive for cologuard last may. he c/o occasional heartburn, no other GI complaints, c/o weight loss since before his heart surgery, his cardiologist is Dr. Avani Fenton List/Past Medical (Reza Chua. [place]; 7/17/2019 9:01 AM)  Carotid stenosis (433.10  I65.29)    Coronary Artery Disease    Arthritis    Asthma    Skin Cancer    Hypercholesterolemia    PVD (peripheral vascular disease) (443.9  I73. 9)    Insomnia      Past Surgical History (Fatoumata GIBSON [place]; 7/17/2019 9:01 AM)  Coronary Artery Bypass, Three    Angioplasty      Allergies (Valdanilocia ASamuel [place]; 7/17/2019 9:01 AM)  No Known Drug Allergies  [07/12/2019]:  No Known Allergies  [07/12/2019]:    Medication History (Stevencia A. [place]; 7/17/2019 9:01 AM)  Montelukast Sodium  (10MG Tablet, 1 Oral daily) Active. Fluticasone Propionate  (50MCG/ACT Suspension, Nasal) Active. [place] Armor (9-4.8MCG/ACT Aerosol, Inhalation) Active.   Atorvastatin Calcium  (40MG Tablet, 1 Oral daily) Active. Albuterol Sulfate HFA  (108 (90 Base)MCG/ACT Aerosol Soln, Inhalation as needed) Active. Aspirin  (81MG Tablet, 1 Oral daily) Active. Tamsulosin HCl  (0.4MG Capsule, 1 Oral daily) Active. diphenhydrAMINE HCl  (25MG Capsule, 1 Oral daily) Active. MiraLax  (Oral) Active. Albuterol-Ipratropium  (2.5-0.5MG/3ML Solution, Inhalation as needed) Active. Melatonin  (3MG Tablet, Oral as needed) Active. Doxycycline Hyclate  (100MG Capsule, Oral bid) Active. predniSONE  (10MG Tablet, Oral bid) Active. Metoprolol Tartrate  (25MG Tablet, 1/2 tab Oral bid) Active. Senna-Docusate Sodium  (8.6-50MG Tablet, 1 Oral bid) Active. Imodium  (2MG Capsule, Oral as needed) Active. Mucinex  (600MG Tablet ER, 2 Oral bid) Active. Medications Reconciled     Family History (Fatoumata Dennis; 7/17/2019 9:02 AM)  Non-Contributory Family History   First Degree Relatives. Social History (Fatoumata Dennis; 7/17/2019 9:01 AM)  Tobacco Use   Former smoker. Alcohol Use   Occasional alcohol use. Marital status   . Employment status   Retired. Blood Transfusion   No.    Diagnostic Studies History (Fatoumata Dennis; 7/17/2019 9:02 AM)  None  [07/17/2019]:    Health Maintenance History (Fatoumata Joseph; 7/17/2019 9:01 AM)  Flu Vaccine    Pneumovax          Review of Systems (Fatoumata Dennis; 7/17/2019 9:02 AM)  General Not Present- Chronic Fatigue, Poor Appetite, Weight Gain and Weight Loss. Skin Not Present- Itching, Rash and Skin Color Changes. HEENT Not Present- Hearing Loss and Vertigo. Respiratory Not Present- Difficulty Breathing and TB exposure. Cardiovascular Not Present- Chest Pain, Use of Antibiotics before Dental Procedures and Use of Blood Thinners. Gastrointestinal Present- See HPI. Musculoskeletal Not Present- Arthritis, Hip Replacement Surgery and Knee Replacement Surgery. Neurological Not Present- Weakness.   Psychiatric Not Present- Depression. Endocrine Not Present- Diabetes and Thyroid Problems. Hematology Not Present- Anemia. Vitals (Fatoumata Gray [place]as; 7/17/2019 9:09 AM)  7/17/2019 9:01 AM  Weight: 158 lb   Height: 67 in   Body Surface Area: 1.83 m²   Body Mass Index: 24.75 kg/m²    Pulse: 72 (Regular)    Resp.: 20 (Unlabored)     BP: 124/60 (Sitting, Left Arm, Standard)              Physical Exam Yvette Davis MD; 7/17/2019 9:36 AM)  General  Mental Status - Alert. General Appearance - Cooperative, Pleasant, Not in acute distress. Orientation - Oriented X3. Build & Nutrition - Well nourished and Well developed. Integumentary  General Characteristics  Overall examination of the patient's skin reveals - no rashes, no bruises and no spider angiomas. Color - normal coloration of skin. Head and Neck  Neck  Global Assessment - full range of motion and supple, no bruit auscultated on the right, no bruit auscultated on the left, non-tender, no lymphadenopathy. Thyroid  Gland Characteristics - normal size and consistency. Eye  Eyeball - Left - No Exophthalmos. Eyeball - Right - No Exophthalmos. Sclera/Conjunctiva - Left - No Jaundice. Sclera/Conjunctiva - Right - No Jaundice. Chest and Lung Exam  Chest and lung exam reveals  - quiet, even and easy respiratory effort with no use of accessory muscles. Auscultation  Breath sounds - Normal. Adventitious sounds - No Adventitious sounds. Cardiovascular  Auscultation  Rhythm - Regular, No Tachycardia, No Bradycardia . Heart Sounds - Normal heart sounds , S1 WNL and S2 WNL, No S3, No Summation Gallop. Murmurs & Other Heart Sounds - Auscultation of the heart reveals - No Murmurs. Abdomen  Palpation/Percussion  Tenderness - Non-Tender. Rebound tenderness - No rebound. Rigidity (guarding) - No Rigidity. Dullness to percussion - No abnormal dullness to percussion. Liver - No hepatosplenomegaly. Abdominal Mass Palpable - No masses.  Other Characteristics - No Ascites. Auscultation  Auscultation of the abdomen reveals - Bowel sounds normal, No Abdominal bruits and No Succussion splash. Rectal - Did not examine. Peripheral Vascular  Upper Extremity  Inspection - Left - Normal - No Clubbing, No Cyanosis, No Edema, Pulses Intact. Right - Normal - No Clubbing, No Cyanosis, No Edema, Pulses Intact. Palpation - Edema - Left - No edema. Right - No edema. Lower Extremity  Inspection - Left - Inspection Normal. Right - Inspection Normal. Palpation - Edema - Left - No edema. Right - No edema. Neurologic  Neurologic evaluation reveals  - Cranial nerves grossly intact, no focal neurologic deficits. Motor  Involuntary Movements - Asterixis - not present. Musculoskeletal  Global Assessment  Gait and Station - normal gait and station. Assessment & Plan (Marek Nance MD; 7/17/2019 9:38 AM)  Positive colorectal cancer screening using Cologuard test (787.7  R19.5)  Impression: i met him before his CABG last may at [facility], got consulted for cough which resolved. he never had colonoscopy done before, tested positive for cologuard last may. he c/o occasional heartburn, no other GI complaints, c/o weight loss since before his heart surgery, his cardiologist is Dr. Bradford Forbes  colonoscopy to r/o any colonic neoplasm  will get clearance form his cardiologist  given sample of plenvu  Current Plans  Colonoscopy (78022) (Discussed risks and benefits with the patient to include:; perforation, post polypectomy, or post biopsy bleeding, missed lesions, and sedation reactions.)  Pt Education - How to access health information online: discussed with patient and provided information. Pt Education - How to access health information online: discussed with patient and provided information. Weight loss (783.21  R63.4)  Date of Surgery Update:  Julia Schroeder was seen and examined. History and physical has been reviewed. The patient has been examined.  There have been no significant clinical changes since the completion of the originally dated History and Physical.    Signed By: Ferny Hernandez MD     August 28, 2019 8:57 AM

## 2019-08-28 NOTE — DISCHARGE INSTRUCTIONS
[address], [address] [place] [address]    COLON DISCHARGE INSTRUCTIONS    Julia Schroeder  [id number]  1945    Discomfort:  Redness at IV site- apply warm compress to area; if redness or soreness persist- contact your physician  There may be a slight amount of blood passed from the rectum  Gaseous discomfort- walking, belching will help relieve any discomfort  You may not operate a vehicle for 12 hours  You may not engage in an occupation involving machinery or appliances for rest of today  You may not drink alcoholic beverages for at least 12 hours  Avoid making any critical decisions for at least 24 hour  DIET:  You may resume your regular diet - however -  remember your colon is empty and a heavy meal will produce gas. Avoid these foods:  vegetables, fried / greasy foods, carbonated drinks     ACTIVITY:  You may  resume your normal daily activities it is recommended that you spend the remainder of the day resting -  avoid any strenuous activity. CALL M.D. ANY SIGN OF:   Increasing pain, nausea, vomiting  Abdominal distension (swelling)  New increased bleeding (oral or rectal)  Fever (chills)  Pain in chest area  Bloody discharge from nose or mouth  Shortness of breath      Follow-up Instructions:   Call Dr. Selena Mijares for any questions or problems at [address] [place] [address] St:   Your colonoscopy showed 8 polyps, removed, rest of exam was normal.  Telephone # [phone]      Signed By: Selena Mijares MD     8/28/2019  9:27 AM       DISCHARGE SUMMARY from Nurse    The following personal items collected during your admission are returned to you:   Dental Appliance:    Vision: Visual Aid: Glasses  Hearing Aid:    Isidro Chava:    Clothing:    Other Valuables:    Valuables sent to safe:      Patient Education        Colon Polyps: Care Instructions  Your Care Instructions    Colon polyps are growths in the colon or the rectum.  The cause of most colon polyps is not known, and most people who get them do not have any problems. But a certain kind can turn into cancer. For this reason, regular testing for colon polyps is important for people as they get older. It is also important for anyone who has an increased risk for colon cancer. Polyps are usually found through routine colon cancer screening tests. Although most colon polyps are not cancerous, they are usually removed and then tested for cancer. Screening for colon cancer saves lives because the cancer can usually be cured if it is caught early. If you have a polyp that is the type that can turn into cancer, you may need more tests to examine your entire colon. The doctor will remove any other polyps that he or she finds, and you will be tested more often. Follow-up care is a key part of your treatment and safety. Be sure to make and go to all appointments, and call your doctor if you are having problems. It's also a good idea to know your test results and keep a list of the medicines you take. How can you care for yourself at home? Regular exams to look for colon polyps are the best way to prevent polyps from turning into colon cancer. These can include stool tests, sigmoidoscopy, colonoscopy, and CT colonography. Talk with your doctor about a testing schedule that is right for you. To prevent polyps  There is no home treatment that can prevent colon polyps. But these steps may help lower your risk for cancer. · Stay active. Being active can help you get to and stay at a healthy weight. Try to exercise on most days of the week. Walking is a good choice. · Eat well. Choose a variety of vegetables, fruits, legumes (such as peas and beans), fish, poultry, and whole grains. · Do not smoke. If you need help quitting, talk to your doctor about stop-smoking programs and medicines. These can increase your chances of quitting for good. · If you drink alcohol, limit how much you drink.  Limit alcohol to 2 drinks a day for men and 1 drink a day for women. When should you call for help? Call your doctor now or seek immediate medical care if:    · You have severe belly pain.     · Your stools are maroon or very bloody.    Watch closely for changes in your health, and be sure to contact your doctor if:    · You have a fever.     · You have nausea or vomiting.     · You have a change in bowel habits (new constipation or diarrhea).     · Your symptoms get worse or are not improving as expected. Where can you learn more? Go to http://mustapha-bo.info/. Enter 95 [id number] in the search box to learn more about \"Colon Polyps: Care Instructions. \"  Current as of: December 19, 2018  Content Version: 12.1  © 7457-1540 [employer]wise, Incorporated. Care instructions adapted under license by [employer] (which disclaims liability or warranty for this information). If you have questions about a medical condition or this instruction, always ask your healthcare professional. [place] [address] any warranty or liability for your use of this information.

## 2019-08-28 NOTE — ANESTHESIA PREPROCEDURE EVALUATION
Anesthetic History   No history of anesthetic complications            Review of Systems / Medical History  Patient summary reviewed, nursing notes reviewed and pertinent labs reviewed    Pulmonary    COPD        Asthma        Neuro/Psych              Cardiovascular              CAD      Comments: S/p CABG 3 months ago.  Normal EF   GI/Hepatic/Renal     GERD           Endo/Other        Arthritis     Other Findings   Comments: Urinary retention           Physical Exam    Airway  Mallampati: III  TM Distance: 4 - 6 cm  Neck ROM: normal range of motion   Mouth opening: Normal     Cardiovascular    Rhythm: regular  Rate: normal         Dental  No notable dental hx       Pulmonary  Breath sounds clear to auscultation               Abdominal  Abdominal exam normal       Other Findings            Anesthetic Plan    ASA: 3  Anesthesia type: MAC          Induction: Intravenous  Anesthetic plan and risks discussed with: Patient

## 2019-08-28 NOTE — ANESTHESIA POSTPROCEDURE EVALUATION
Post-Anesthesia Evaluation and Assessment    Patient: Shanique Siegel MRN: [id number]  SSN: [id number]    YOB: 1945  Age: 75 y.o. Sex: male      I have evaluated the patient and they are stable and ready for discharge from the PACU. Cardiovascular Function/Vital Signs  Visit Vitals  /61   Pulse (!) 58   Temp 36.7 °C (98 °F)   Resp 20   Ht 5' 7\" (1.702 m)   Wt 71.7 kg (158 lb)   SpO2 99%   BMI 24.75 kg/m²       Patient is status post MAC anesthesia for Procedure(s):  COLONOSCOPY  ENDOSCOPIC POLYPECTOMY. Nausea/Vomiting: None    Postoperative hydration reviewed and adequate. Pain:  Pain Scale 1: Numeric (0 - 10) (08/28/19 0940)  Pain Intensity 1: 0 (08/28/19 0940)   Managed    Neurological Status: At baseline    Mental Status, Level of Consciousness: Alert and  oriented to person, place, and time    Pulmonary Status:   O2 Device: Room air (08/28/19 0940)   Adequate oxygenation and airway patent    Complications related to anesthesia: None    Post-anesthesia assessment completed.  No concerns    Signed By: Madeleine Kaminski MD     August 28, 2019

## 2019-08-28 NOTE — PERIOP NOTES

## 2019-08-28 NOTE — PROCEDURES
[address] 63  [address] [facility], [address] [place] [address]      Colonoscopy Operative Report    Sweta Olivas  [id number]  1945      Procedure Type:   Colonoscopy with polypectomy (snare cautery), polypectomy (hot biopsy)     Indications:    positive cologuard    First one    Pre-operative Diagnosis: see indication above    Post-operative Diagnosis:  See findings below    :  Jonh Blanton MD    Surgical Assistant: None    Implants:  None    Referring Provider: Isiah Caban MD      Sedation:  MAC anesthesia Propofol      Procedure Details:  After informed consent was obtained with all risks and benefits of procedure explained and preoperative exam completed, the patient was taken to the endoscopy suite and placed in the left lateral decubitus position. Upon sequential sedation as per above, a digital rectal exam was performed demonstrating internal hemorrhoids. The Olympus videocolonoscope  was inserted in the rectum and carefully advanced to the cecum, which was identified by the ileocecal valve and appendiceal orifice. The cecum was identified by the ileocecal valve and appendiceal orifice. The quality of preparation was good. The colonoscope was slowly withdrawn with careful evaluation between folds. Retroflexion in the rectum was completed . Findings:   Rectum: normal  2 cm pedunculated polyp removed by hot snaring  Sigmoid: normal  Descending Colon: 2 polyps around 9 mm each removed by hot biopsies  Transverse Colon: 2 polyps around 1.5 cm in size each removed by hot snaring  Ascending Colon: 9 mm polyp removed by hot biopsy, 1.5 cm polyp removed by hot snaring  Cecum: 9 mm polyp removed by hot biopsy        Specimen Removed:  as above    Complications: None. EBL:  None. Impression:    see findings    Recommendations: --Await pathology.       Recommendation for next colonscopy in 1 years      Signed By: Jonh Blanton MD     8/28/2019  9:23 AM

## 2019-08-28 NOTE — PROGRESS NOTES
Dorothey Fleischer  1945  [id number]    Situation:  Verbal report received from: Alejandra Fox rn  Procedure: Procedure(s):  COLONOSCOPY  ENDOSCOPIC POLYPECTOMY    Background:    Preoperative diagnosis: POSITIVE COLOGUARD  Postoperative diagnosis: Colon Polyps    :  Dr. Marcel Chin): Endoscopy Technician-1: Tara MANLEY  Endoscopy RN-1: Luana Hennessy RN    Specimens:   ID Type Source Tests Collected by Time Destination   1 : cecal polyp Preservative Cecum  Jevon Thurston MD 8/28/2019 0908 Pathology   2 : ascending colon polyps Preservative Colon, Ascending  Jevon Thurston MD 8/28/2019 0908 Pathology   3 : transverse colon polyps Preservative Colon, Transverse  Jevon Thurston MD 8/28/2019 0911 Pathology   4 : descending colon polyps Preservative Duodenum  Jevon Thurston MD 8/28/2019 9581 Pathology   5 : rectal polyp Preservative Rectum  Surendra Jennings MD 8/28/2019 6150 Pathology     H. Pylori  no    Assessment:  Intra-procedure medications   Anesthesia gave intra-procedure sedation and medications, see anesthesia flow sheet yes    Intravenous fluids: NS@ KVO     Vital signs stable  yes    Abdominal assessment: round and soft  yes    Recommendation:  Discharge patient per MD order yes.   Family or Friend  yes  Permission to share finding with family or friend yes

## 2019-09-05 ENCOUNTER — APPOINTMENT (OUTPATIENT)
Dept: CARDIAC REHAB | Age: 74
End: 2019-09-05

## 2019-09-09 ENCOUNTER — APPOINTMENT (OUTPATIENT)
Dept: CARDIAC REHAB | Age: 74
End: 2019-09-09

## 2019-09-16 NOTE — CARDIO/PULMONARY
Wilson Rosales  75 y.o. With diagnosis of CABG on 05/14/19, attended phase II cardiac rehab for 29 sessions from 06/13/19 - 08/26/19. He never turned in d/c paperwork and did not attend a d/c appointment.     Dawn Wild RN  9/16/2019

## 2019-10-10 ENCOUNTER — HOSPITAL ENCOUNTER (OUTPATIENT)
Dept: GENERAL RADIOLOGY | Age: 74
Discharge: HOME | End: 2019-10-10
Attending: PHYSICIAN ASSISTANT
Payer: MEDICARE

## 2019-10-10 ENCOUNTER — HOSPITAL ENCOUNTER (OUTPATIENT)
Dept: CT IMAGING | Age: 74
Discharge: HOME | End: 2019-10-10
Attending: PHYSICIAN ASSISTANT
Payer: MEDICARE

## 2019-10-10 DIAGNOSIS — R05.9 COUGH: ICD-10-CM

## 2019-10-10 DIAGNOSIS — J32.9 CHRONIC SINUSITIS: ICD-10-CM

## 2019-10-10 PROCEDURE — 71046 X-RAY EXAM CHEST 2 VIEWS: CPT

## 2019-10-10 PROCEDURE — 70486 CT MAXILLOFACIAL W/O DYE: CPT

## 2020-12-31 ENCOUNTER — HOSPITAL ENCOUNTER (OUTPATIENT)
Dept: PREADMISSION TESTING | Age: 75
Discharge: HOME | End: 2020-12-31
Payer: MEDICARE

## 2020-12-31 ENCOUNTER — TRANSCRIBE ORDER (OUTPATIENT)
Dept: REGISTRATION | Age: 75
End: 2020-12-31

## 2020-12-31 DIAGNOSIS — Z01.812 PRE-PROCEDURE LAB EXAM: ICD-10-CM

## 2020-12-31 DIAGNOSIS — Z01.812 PRE-PROCEDURE LAB EXAM: Primary | ICD-10-CM

## 2020-12-31 PROCEDURE — 87635 SARS-COV-2 COVID-19 AMP PRB: CPT

## 2021-01-01 LAB — SARS-COV-2, COV2NT: NOT DETECTED

## 2021-01-04 ENCOUNTER — ANESTHESIA EVENT (OUTPATIENT)
Dept: ENDOSCOPY | Age: 76
End: 2021-01-04
Payer: MEDICARE

## 2021-01-04 ENCOUNTER — ANESTHESIA (OUTPATIENT)
Dept: ENDOSCOPY | Age: 76
End: 2021-01-04
Payer: MEDICARE

## 2021-01-04 ENCOUNTER — HOSPITAL ENCOUNTER (OUTPATIENT)
Age: 76
Setting detail: OUTPATIENT SURGERY
Discharge: HOME | End: 2021-01-04
Attending: INTERNAL MEDICINE | Admitting: INTERNAL MEDICINE
Payer: MEDICARE

## 2021-01-04 VITALS
HEART RATE: 47 BPM | TEMPERATURE: 97.3 F | WEIGHT: 168 LBS | DIASTOLIC BLOOD PRESSURE: 49 MMHG | RESPIRATION RATE: 17 BRPM | HEIGHT: 67 IN | BODY MASS INDEX: 26.37 KG/M2 | OXYGEN SATURATION: 96 % | SYSTOLIC BLOOD PRESSURE: 117 MMHG

## 2021-01-04 PROCEDURE — 2709999900 HC NON-CHARGEABLE SUPPLY: Performed by: INTERNAL MEDICINE

## 2021-01-04 PROCEDURE — 74011250636 HC RX REV CODE- 250/636: Performed by: NURSE ANESTHETIST, CERTIFIED REGISTERED

## 2021-01-04 PROCEDURE — 76060000032 HC ANESTHESIA 0.5 TO 1 HR: Performed by: INTERNAL MEDICINE

## 2021-01-04 PROCEDURE — 77030010936 HC CLP LIG BSC -C: Performed by: INTERNAL MEDICINE

## 2021-01-04 PROCEDURE — 77030013992 HC SNR POLYP ENDOSC BSC -B: Performed by: INTERNAL MEDICINE

## 2021-01-04 PROCEDURE — 74011250637 HC RX REV CODE- 250/637: Performed by: INTERNAL MEDICINE

## 2021-01-04 PROCEDURE — 76040000007: Performed by: INTERNAL MEDICINE

## 2021-01-04 PROCEDURE — 88305 TISSUE EXAM BY PATHOLOGIST: CPT

## 2021-01-04 PROCEDURE — 74011000250 HC RX REV CODE- 250: Performed by: NURSE ANESTHETIST, CERTIFIED REGISTERED

## 2021-01-04 DEVICE — WORKING LENGTH 235CM, WORKING CHANNEL 2.8MM
Type: IMPLANTABLE DEVICE | Site: SIGMOID COLON | Status: FUNCTIONAL
Brand: RESOLUTION 360 CLIP

## 2021-01-04 RX ORDER — FLUMAZENIL 0.1 MG/ML
0.2 INJECTION INTRAVENOUS
Status: ACTIVE | OUTPATIENT
Start: 2021-01-04 | End: 2021-01-04

## 2021-01-04 RX ORDER — ATROPINE SULFATE 0.1 MG/ML
0.5 INJECTION INTRAVENOUS
Status: DISCONTINUED | OUTPATIENT
Start: 2021-01-04 | End: 2021-01-05 | Stop reason: HOSPADM

## 2021-01-04 RX ORDER — LIDOCAINE HYDROCHLORIDE 20 MG/ML
INJECTION, SOLUTION EPIDURAL; INFILTRATION; INTRACAUDAL; PERINEURAL AS NEEDED
Status: DISCONTINUED | OUTPATIENT
Start: 2021-01-04 | End: 2021-01-04 | Stop reason: HOSPADM

## 2021-01-04 RX ORDER — DEXTROMETHORPHAN/PSEUDOEPHED 2.5-7.5/.8
1.2 DROPS ORAL
Status: DISCONTINUED | OUTPATIENT
Start: 2021-01-04 | End: 2021-01-05 | Stop reason: HOSPADM

## 2021-01-04 RX ORDER — EPINEPHRINE 0.1 MG/ML
1 INJECTION INTRACARDIAC; INTRAVENOUS
Status: DISCONTINUED | OUTPATIENT
Start: 2021-01-04 | End: 2021-01-05 | Stop reason: HOSPADM

## 2021-01-04 RX ORDER — SODIUM CHLORIDE 0.9 % (FLUSH) 0.9 %
5-40 SYRINGE (ML) INJECTION AS NEEDED
Status: DISCONTINUED | OUTPATIENT
Start: 2021-01-04 | End: 2021-01-05 | Stop reason: HOSPADM

## 2021-01-04 RX ORDER — SODIUM CHLORIDE 9 MG/ML
50 INJECTION, SOLUTION INTRAVENOUS CONTINUOUS
Status: DISPENSED | OUTPATIENT
Start: 2021-01-04 | End: 2021-01-04

## 2021-01-04 RX ORDER — SODIUM CHLORIDE 0.9 % (FLUSH) 0.9 %
5-40 SYRINGE (ML) INJECTION EVERY 8 HOURS
Status: DISCONTINUED | OUTPATIENT
Start: 2021-01-04 | End: 2021-01-05 | Stop reason: HOSPADM

## 2021-01-04 RX ORDER — FENTANYL CITRATE 50 UG/ML
25-200 INJECTION, SOLUTION INTRAMUSCULAR; INTRAVENOUS
Status: ACTIVE | OUTPATIENT
Start: 2021-01-04 | End: 2021-01-04

## 2021-01-04 RX ORDER — SODIUM CHLORIDE 9 MG/ML
INJECTION, SOLUTION INTRAVENOUS
Status: DISCONTINUED | OUTPATIENT
Start: 2021-01-04 | End: 2021-01-04 | Stop reason: HOSPADM

## 2021-01-04 RX ORDER — PROPOFOL 10 MG/ML
INJECTION, EMULSION INTRAVENOUS AS NEEDED
Status: DISCONTINUED | OUTPATIENT
Start: 2021-01-04 | End: 2021-01-04 | Stop reason: HOSPADM

## 2021-01-04 RX ORDER — MIDAZOLAM HYDROCHLORIDE 1 MG/ML
.25-5 INJECTION, SOLUTION INTRAMUSCULAR; INTRAVENOUS
Status: ACTIVE | OUTPATIENT
Start: 2021-01-04 | End: 2021-01-04

## 2021-01-04 RX ORDER — NALOXONE HYDROCHLORIDE 0.4 MG/ML
0.4 INJECTION, SOLUTION INTRAMUSCULAR; INTRAVENOUS; SUBCUTANEOUS
Status: ACTIVE | OUTPATIENT
Start: 2021-01-04 | End: 2021-01-04

## 2021-01-04 RX ADMIN — PROPOFOL 30 MG: 10 INJECTION, EMULSION INTRAVENOUS at 09:29

## 2021-01-04 RX ADMIN — PROPOFOL 30 MG: 10 INJECTION, EMULSION INTRAVENOUS at 09:25

## 2021-01-04 RX ADMIN — PROPOFOL 30 MG: 10 INJECTION, EMULSION INTRAVENOUS at 09:36

## 2021-01-04 RX ADMIN — SODIUM CHLORIDE: 900 INJECTION, SOLUTION INTRAVENOUS at 08:56

## 2021-01-04 RX ADMIN — LIDOCAINE HYDROCHLORIDE 60 MG: 20 INJECTION, SOLUTION EPIDURAL; INFILTRATION; INTRACAUDAL; PERINEURAL at 09:21

## 2021-01-04 RX ADMIN — PROPOFOL 50 MG: 10 INJECTION, EMULSION INTRAVENOUS at 09:18

## 2021-01-04 RX ADMIN — PROPOFOL 50 MG: 10 INJECTION, EMULSION INTRAVENOUS at 09:21

## 2021-01-04 NOTE — PROCEDURES
[address], [address] [place] [address]      Colonoscopy Operative Report    Beth Harrison  [id number]  1945      Procedure Type:   Colonoscopy with polypectomy (snare cautery)     Indications:    Personal history of colon polyps (screening only)   Date of last colonoscopy: 1.5 years ago, Polyps  Yes    Pre-operative Diagnosis: see indication above    Post-operative Diagnosis:  See findings below    :  Cadne Franco MD    Surgical Assistant: Endoscopy Technician-1: Dorothea Millard  Endoscopy RN-1: Raford Nissen, RN    Implants:  None    Referring Provider: Braeden Goodwin MD      Sedation:  MAC anesthesia Propofol      Procedure Details:  After informed consent was obtained with all risks and benefits of procedure explained and preoperative exam completed, the patient was taken to the endoscopy suite and placed in the left lateral decubitus position. Upon sequential sedation as per above, a digital rectal exam was performed demonstrating internal hemorrhoids. The Olympus videocolonoscope  was inserted in the rectum and carefully advanced to the cecum, which was identified by the ileocecal valve and appendiceal orifice. The cecum was identified by the ileocecal valve and appendiceal orifice. The quality of preparation was good. The colonoscope was slowly withdrawn with careful evaluation between folds. Retroflexion in the rectum was completed .      Findings:   Rectum: 2 sessile polyps around 9 mm in size each removed by hot biopsies    Sigmoid:  2 sessile polyps in proximal sigmoid colon at 60 cm from anus, around 1.5 cm in size each, adjacent to each others, removed by hot snaring, one hemoclip was placed respectively at each site to prevent bleeding    9 mm sessile polyp in mid sigmoid colon removed by hot biopsy    1.5 cm sessile polyp in distal sigmoid colon at 35 cm from anus, removed by hot snaring, one hemoclip was placed at site to prevent bleeding      Descending Colon: normal  Transverse Colon: normal  Ascending Colon: normal  Cecum: normal      Specimen Removed:  as above    Complications: None. EBL:  None. Impression:    see findings    Recommendations: --Await pathology. , -Repeat colonoscopy in 1 year    Resume ASA in am     Signed By: Aquiles Perez MD     1/4/2021  9:47 AM

## 2021-01-04 NOTE — ANESTHESIA PREPROCEDURE EVALUATION
Anesthetic History   No history of anesthetic complications            Review of Systems / Medical History  Patient summary reviewed, nursing notes reviewed and pertinent labs reviewed    Pulmonary    COPD        Asthma        Neuro/Psych              Cardiovascular              CAD    Exercise tolerance: >4 METS  Comments: .  Normal EF   GI/Hepatic/Renal     GERD           Endo/Other        Arthritis     Other Findings   Comments: Urinary retention           Physical Exam    Airway  Mallampati: III  TM Distance: 4 - 6 cm  Neck ROM: normal range of motion   Mouth opening: Normal     Cardiovascular    Rhythm: regular  Rate: normal         Dental  No notable dental hx       Pulmonary  Breath sounds clear to auscultation               Abdominal  Abdominal exam normal       Other Findings            Anesthetic Plan    ASA: 3  Anesthesia type: MAC          Induction: Intravenous  Anesthetic plan and risks discussed with: Patient

## 2021-01-04 NOTE — PERIOP NOTES

## 2021-01-04 NOTE — ANESTHESIA POSTPROCEDURE EVALUATION
Post-Anesthesia Evaluation and Assessment    Patient: Mery Davalos MRN: [id number]  SSN: [id number]    YOB: 1945  Age: 67 y.o. Sex: male      I have evaluated the patient and they are stable and ready for discharge from the PACU. Cardiovascular Function/Vital Signs  Visit Vitals  BP (!) 143/53   Pulse (!) 51   Temp 36.3 °C (97.4 °F)   Resp 17   Ht 5' 7\" (1.702 m)   Wt 76.2 kg (168 lb)   SpO2 97%   BMI 26.31 kg/m²       Patient is status post MAC anesthesia for Procedure(s):  . COLONOSCOPY   :-  ENDOSCOPIC POLYPECTOMY  RESOLUTION CLIP. Nausea/Vomiting: None    Postoperative hydration reviewed and adequate. Pain:  Pain Scale 1: Numeric (0 - 10) (01/04/21 0840)  Pain Intensity 1: 0 (01/04/21 0840)   Managed    Neurological Status: At baseline    Mental Status, Level of Consciousness: Alert and  oriented to person, place, and time    Pulmonary Status:   O2 Device: Room air (01/04/21 0840)   Adequate oxygenation and airway patent    Complications related to anesthesia: None    Post-anesthesia assessment completed. No concerns    Signed By: Lazarus Clinton, MD     January 4, 2021              Procedure(s):  . COLONOSCOPY   :-  ENDOSCOPIC POLYPECTOMY  RESOLUTION CLIP. MAC    <[email or web address]>    INITIAL Post-op Vital signs:   Vitals Value Taken Time   BP     Temp     Pulse 56 01/04/21 0952   Resp 19 01/04/21 0952   SpO2 97 % 01/04/21 0952   Vitals shown include unvalidated device data.

## 2021-01-04 NOTE — H&P
[address]  174 [facility], [address] [place] [address]      History and Physical       NAME:  Sulma Mariscal   :   1945   MRN:   [id number]             History of Present Illness:  Patient is a 67 y.o. who is seen for h/o colon polyps. PMH:  Past Medical History:   Diagnosis Date    Adverse effect of anesthesia     difficulty \"heavy narcotic anesthesia\"    Arthritis     Asthma     INHALER USED DAILY    CAD (coronary artery disease)     Cancer ([address]     SKIN - ? BCCA and ?squamous cell removed    Chronic obstructive pulmonary disease (HCC)     GERD (gastroesophageal reflux disease)     Other ill-defined conditions(799.89)     HIGH CHOLESTEROL    Other ill-defined conditions(799.89)     PVD    Other ill-defined conditions(799.89)     OCCAS INSOMNIA       PSH:  Past Surgical History:   Procedure Laterality Date    COLONOSCOPY N/A 2019    COL[facility] performed [facility] at [facility]OSCOPY    HX KNEE ARTHROSCOPY Left     HX ORTHOPAEDIC      surgery for broken wrist    [place] CABG, ARTERY-VEIN, THREE  05/15/2019    triple bypass    VASCULAR SURGERY PROCEDURE UNLIST      LEFT LEG, SFA ANGIOPLASTY    VASCULAR SURGERY PROCEDURE UNLIST      cleaned out carotid artery    VASCULAR SURGERY PROCEDURE UNLIST      right leg angioplasty       Allergies:  No Known Allergies    Home Medications:  Prior to Admission Medications   Prescriptions Last Dose Informant Patient Reported? Taking? albuterol (PROAIR HFA) 90 mcg/actuation inhaler 1/3/2021 at Unknown time  Yes Yes   Sig: Take 1 Puff by inhalation as needed for Wheezing. aspirin 81 mg tablet 1/3/2021 at Unknown time  Yes Yes   Sig: Take 81 mg by mouth daily. atorvastatin (LIPITOR) 40 mg tablet 1/3/2021 at Unknown time  Yes Yes   Sig: Take 40 mg by mouth nightly. diphenhydrAMINE (WAL-DRYL ALLERGY) 25 mg capsule 2020 at Unknown time  Yes Yes   Sig: Take 25 mg by mouth nightly as needed.    fluticasone furoate-vilanterol (BREO ELLIPTA) 200-25 mcg/dose inhaler 1/3/2021 at Unknown time  Yes Yes   Sig: Take 1 Puff by inhalation daily. fluticasone propionate (ALLERGY RELIEF, FLUTICASONE, NA) Not Taking at Unknown time  Yes No   Sig: by Nasal route. Fluticasone nasal spray, one spray both nostrils daily as needed. melatonin 3 mg tablet 12/4/2020 at Unknown time  Yes Yes   Sig: Take 3-6 mg by mouth nightly as needed. metoprolol tartrate (LOPRESSOR) 25 mg tablet 1/3/2021 at Unknown time  No Yes   Sig: Take 0.5 Tabs by mouth every twelve (12) hours. montelukast (SINGULAIR) 10 mg tablet 1/3/2021 at Unknown time  Yes Yes   Sig: Take 10 mg by mouth daily. tamsulosin (FLOMAX) 0.4 mg capsule 1/3/2021 at Unknown time  Yes Yes   Sig: Take 0.4 mg by mouth daily.       Facility-Administered Medications: None       Hospital Medications:  Current Facility-Administered Medications   Medication Dose Route Frequency    0.9% sodium chloride infusion  50 mL/hr IntraVENous CONTINUOUS    sodium chloride (NS) flush 5-40 mL  5-40 mL IntraVENous Q8H    sodium chloride (NS) flush 5-40 mL  5-40 mL IntraVENous PRN    midazolam (VERSED) injection 0.25-5 mg  0.25-5 mg IntraVENous Multiple    fentaNYL citrate (PF) injection  mcg   mcg IntraVENous Multiple    naloxone (NARCAN) injection 0.4 mg  0.4 mg IntraVENous Multiple    flumazeniL (ROMAZICON) 0.1 mg/mL injection 0.2 mg  0.2 mg IntraVENous Multiple    simethicone (MYLICON) 26LW/6.6QX oral drops 80 mg  1.2 mL Oral Multiple    atropine injection 0.5 mg  0.5 mg IntraVENous ONCE PRN    EPINEPHrine (ADRENALIN) 0.1 mg/mL syringe 1 mg  1 mg Endoscopically ONCE PRN     Facility-Administered Medications Ordered in Other Encounters   Medication Dose Route Frequency    0.9% sodium chloride infusion   IntraVENous CONTINUOUS       Social History:  Social History     Tobacco Use    Smoking status: Former Smoker     Packs/day: 0.75     Years: 50.00     Pack years: 37.50 Quit date: 2019     Years since quittin.6    Smokeless tobacco: Never Used   Substance Use Topics    Alcohol use: Yes     Alcohol/week: 4.2 standard drinks     Types: 5 Standard drinks or equivalent per week     Comment: occasional drink       Family History:  Family History   Problem Relation Age of Onset    Hypertension Mother     Heart Failure Mother         CHF    Hypertension Father     Heart Failure Father         CHF         The patient was counseled at length about the risks of kush Covid-19 in the lynnette-operative and post-operative states including the recovery window of their procedure. The patient was made aware that kush Covid-19 after a surgical procedure may worsen their prognosis for recovering from the virus and lend to a higher morbidity and or mortality risk. The patient was given the options of postponing their procedure. All of the risks, benefits, and alternatives were discussed. The patient does  wish to proceed with the procedure. Review of Systems:      Constitutional: negative fever, negative chills, negative weight loss  Eyes:   negative visual changes  ENT:   negative sore throat, tongue or lip swelling  Respiratory:  negative cough, negative dyspnea  Cards:  negative for chest pain, palpitations, lower extremity edema  GI:   See HPI  :  negative for frequency, dysuria  Integument:  negative for rash and pruritus  Heme:  negative for easy bruising and gum/nose bleeding  Musculoskel: negative for myalgias,  back pain and muscle weakness  Neuro: negative for headaches, dizziness, vertigo  Psych:  negative for feelings of anxiety, depression       Objective:     Patient Vitals for the past 8 hrs:   BP Temp Pulse Resp SpO2 Height Weight   21 0840 (!) 143/53 97.4 °F (36.3 °C) (!) 51 17 97 % 5' 7\" (1.702 m) 76.2 kg (168 lb)     No intake/output data recorded. No intake/output data recorded.     EXAM:     NEURO-a&o   HEENT-wnl   LUNGS-clear    COR-regular rate and rhythym     ABD-soft , no tenderness, no rebound, good bs     EXT-no edema     Data Review     No results for input(s): WBC, HGB, HCT, PLT, HGBEXT, HCTEXT, PLTEXT in the last 72 hours. No results for input(s): NA, K, CL, CO2, BUN, CREA, GLU, PHOS, CA in the last 72 hours. No results for input(s): AP, TBIL, TP, ALB, GLOB, GGT, AML, LPSE in the last 72 hours. No lab exists for component: SGOT, GPT, AMYP, HLPSE  No results for input(s): INR, PTP, APTT, INREXT in the last 72 hours.        Assessment:     · H/o colon polyps     Patient Active Problem List   Diagnosis Code    Carotid stenosis I65.29    CAD (coronary artery disease) I25.10    S/P CABG x 3 Z95.1       Plan:   ·   · Endoscopic procedure with sedation     Signed By: Linwood Garcia MD     1/4/2021  9:11 AM

## 2021-01-04 NOTE — DISCHARGE INSTRUCTIONS
[address], [address]     COLON DISCHARGE INSTRUCTIONS    Niraj Decree  [id number]  1945    Discomfort:  Redness at IV site- apply warm compress to area; if redness or soreness persist- contact your physician  There may be a slight amount of blood passed from the rectum  Gaseous discomfort- walking, belching will help relieve any discomfort  You may not operate a vehicle for 12 hours  You may not engage in an occupation involving machinery or appliances for rest of today  You may not drink alcoholic beverages for at least 12 hours  Avoid making any critical decisions for at least 24 hour  DIET:  You may resume your regular diet - however -  remember your colon is empty and a heavy meal will produce gas. Avoid these foods:  vegetables, fried / greasy foods, carbonated drinks     ACTIVITY:  You may  resume your normal daily activities it is recommended that you spend the remainder of the day resting -  avoid any strenuous activity. CALL M.D.   ANY SIGN OF:   Increasing pain, nausea, vomiting  Abdominal distension (swelling)  New increased bleeding (oral or rectal)  Fever (chills)  Pain in chest area  Bloody discharge from nose or mouth  Shortness of breath      Follow-up Instructions:   Call Dr. Shari Arellano for any questions or problems at [address] [place] [address] aspirin in am on 1/5/2021          ENDOSCOPY FINDINGS:   Your colonoscopy showed 6 polyps, all removed, rest of exam was normal.  Telephone # [phone]      Signed By: Shari Arellano MD     1/4/2021  9:52 AM       DISCHARGE SUMMARY from Nurse    The following personal items collected during your admission are returned to you:   Dental Appliance: Dental Appliances: None  Vision: Visual Aid: Magnifying glass  Hearing Aid:    Jewelry:    Clothing:    Other Valuables:    Valuables sent to safe:        Learning About Coronavirus (COVID-19)  Coronavirus [phone]): Overview  What is coronavirus (COVID-19)? The coronavirus disease (COVID-19) is caused by a virus. It is an illness that was first found in [place], [place], in December 2019. It has since spread worldwide. The virus can cause fever, cough, and trouble breathing. In severe cases, it can cause pneumonia and make it hard to breathe without help. It can cause death. Coronaviruses are a large group of viruses. They cause the common cold. They also cause more serious illnesses like Middle East respiratory syndrome (MERS) and severe acute respiratory syndrome (SARS). COVID-19 is caused by a novel coronavirus. That means it's a new type that has not been seen in people before. This virus spreads person-to-person through droplets from coughing and sneezing. It can also spread when you are close to someone who is infected. And it can spread when you touch something that has the virus on it, such as a doorknob or a tabletop. What can you do to protect yourself from coronavirus (COVID-19)? The best way to protect yourself from getting sick is to:  · Avoid areas where there is an outbreak. · Avoid contact with people who may be infected. · Wash your hands often with soap or alcohol-based hand sanitizers. · Avoid crowds and try to stay at least 6 feet away from other people. · Wash your hands often, especially after you cough or sneeze. Use soap and water, and scrub for at least 20 seconds. If soap and water aren't available, use an alcohol-based hand . · Avoid touching your mouth, nose, and eyes. What can you do to avoid spreading the virus to others? To help avoid spreading the virus to others:  · Cover your mouth with a tissue when you cough or sneeze. Then throw the tissue in the trash. · Use a disinfectant to clean things that you touch often. · Stay home if you are sick or have been exposed to the virus. Don't go to school, work, or public areas. And don't use public transportation.   · If you are sick:  ? Leave your home only if you need to get medical care. But call the doctor's office first so they know you're coming. And wear a face mask, if you have one.  ? If you have a face mask, wear it whenever you're around other people. It can help stop the spread of the virus when you cough or sneeze. ? Clean and disinfect your home every day. Use household  and disinfectant wipes or sprays. Take special care to clean things that you grab with your hands. These include doorknobs, remote controls, phones, and handles on your refrigerator and microwave. And don't forget countertops, tabletops, bathrooms, and computer keyboards. When to call for help  Call 911 anytime you think you may need emergency care. For example, call if:  · You have severe trouble breathing. (You can't talk at all.)  · You have constant chest pain or pressure. · You are severely dizzy or lightheaded. · You are confused or can't think clearly. · Your face and lips have a blue color. · You pass out (lose consciousness) or are very hard to wake up. Call your doctor now if you develop symptoms such as:  · Shortness of breath. · Fever. · Cough. If you need to get care, call ahead to the doctor's office for instructions before you go. Make sure you wear a face mask, if you have one, to prevent exposing other people to the virus. Where can you get the latest information? The following health organizations are tracking and studying this virus. Their websites contain the most up-to-date information. Marissa Herring also learn what to do if you think you may have been exposed to the virus. · U.S. Centers for Disease Control and Prevention (CDC): The CDC provides updated news about the disease and travel advice. The website also tells you how to prevent the spread of infection. www.cdc.gov  · World Health Organization [facility]): WHO offers information about the virus outbreaks.  WHO also has travel advice. www.who.int  Current as of: April 1, 2020               Content Version: 12.4  © 9754-8774 [employer]orporated. Care instructions adapted under license by your healthcare professional. If you have questions about a medical condition or this instruction, always ask your healthcare professional. [address] any warranty or liability for your use of this information.

## 2022-03-18 PROBLEM — I25.10 CAD (CORONARY ARTERY DISEASE): Status: ACTIVE | Noted: 2019-05-09

## 2022-03-19 PROBLEM — Z95.1 S/P CABG X 3: Status: ACTIVE | Noted: 2019-05-14

## 2022-07-14 ENCOUNTER — ANESTHESIA (OUTPATIENT)
Dept: ENDOSCOPY | Age: 77
End: 2022-07-14
Payer: MEDICARE

## 2022-07-14 ENCOUNTER — HOSPITAL ENCOUNTER (OUTPATIENT)
Age: 77
Setting detail: OUTPATIENT SURGERY
Discharge: HOME | End: 2022-07-14
Attending: INTERNAL MEDICINE | Admitting: INTERNAL MEDICINE
Payer: MEDICARE

## 2022-07-14 ENCOUNTER — ANESTHESIA EVENT (OUTPATIENT)
Dept: ENDOSCOPY | Age: 77
End: 2022-07-14
Payer: MEDICARE

## 2022-07-14 VITALS
HEART RATE: 50 BPM | BODY MASS INDEX: 26.37 KG/M2 | WEIGHT: 168 LBS | RESPIRATION RATE: 14 BRPM | OXYGEN SATURATION: 97 % | SYSTOLIC BLOOD PRESSURE: 131 MMHG | DIASTOLIC BLOOD PRESSURE: 73 MMHG | HEIGHT: 67 IN | TEMPERATURE: 97.8 F

## 2022-07-14 PROCEDURE — 76060000031 HC ANESTHESIA FIRST 0.5 HR: Performed by: INTERNAL MEDICINE

## 2022-07-14 PROCEDURE — 74011250636 HC RX REV CODE- 250/636: Performed by: INTERNAL MEDICINE

## 2022-07-14 PROCEDURE — 74011250636 HC RX REV CODE- 250/636: Performed by: NURSE ANESTHETIST, CERTIFIED REGISTERED

## 2022-07-14 PROCEDURE — 76040000019: Performed by: INTERNAL MEDICINE

## 2022-07-14 PROCEDURE — 2709999900 HC NON-CHARGEABLE SUPPLY: Performed by: INTERNAL MEDICINE

## 2022-07-14 PROCEDURE — 74011250637 HC RX REV CODE- 250/637: Performed by: INTERNAL MEDICINE

## 2022-07-14 PROCEDURE — 88305 TISSUE EXAM BY PATHOLOGIST: CPT

## 2022-07-14 PROCEDURE — 77030009426 HC FCPS BIOP ENDOSC BSC -B: Performed by: INTERNAL MEDICINE

## 2022-07-14 RX ORDER — ATROPINE SULFATE 0.1 MG/ML
0.5 INJECTION INTRAVENOUS
Status: DISCONTINUED | OUTPATIENT
Start: 2022-07-14 | End: 2022-07-14 | Stop reason: HOSPADM

## 2022-07-14 RX ORDER — EPINEPHRINE 0.1 MG/ML
1 INJECTION INTRACARDIAC; INTRAVENOUS
Status: DISCONTINUED | OUTPATIENT
Start: 2022-07-14 | End: 2022-07-14 | Stop reason: HOSPADM

## 2022-07-14 RX ORDER — MIDAZOLAM HYDROCHLORIDE 1 MG/ML
.25-5 INJECTION, SOLUTION INTRAMUSCULAR; INTRAVENOUS
Status: DISCONTINUED | OUTPATIENT
Start: 2022-07-14 | End: 2022-07-14 | Stop reason: HOSPADM

## 2022-07-14 RX ORDER — SODIUM CHLORIDE 0.9 % (FLUSH) 0.9 %
5-40 SYRINGE (ML) INJECTION EVERY 8 HOURS
Status: DISCONTINUED | OUTPATIENT
Start: 2022-07-14 | End: 2022-07-14 | Stop reason: HOSPADM

## 2022-07-14 RX ORDER — SODIUM CHLORIDE 9 MG/ML
50 INJECTION, SOLUTION INTRAVENOUS CONTINUOUS
Status: DISCONTINUED | OUTPATIENT
Start: 2022-07-14 | End: 2022-07-14 | Stop reason: HOSPADM

## 2022-07-14 RX ORDER — FENTANYL CITRATE 50 UG/ML
25-200 INJECTION, SOLUTION INTRAMUSCULAR; INTRAVENOUS
Status: DISCONTINUED | OUTPATIENT
Start: 2022-07-14 | End: 2022-07-14 | Stop reason: HOSPADM

## 2022-07-14 RX ORDER — FLUMAZENIL 0.1 MG/ML
0.2 INJECTION INTRAVENOUS
Status: DISCONTINUED | OUTPATIENT
Start: 2022-07-14 | End: 2022-07-14 | Stop reason: HOSPADM

## 2022-07-14 RX ORDER — DEXTROMETHORPHAN/PSEUDOEPHED 2.5-7.5/.8
1.2 DROPS ORAL
Status: DISCONTINUED | OUTPATIENT
Start: 2022-07-14 | End: 2022-07-14 | Stop reason: HOSPADM

## 2022-07-14 RX ORDER — PROPOFOL 10 MG/ML
INJECTION, EMULSION INTRAVENOUS AS NEEDED
Status: DISCONTINUED | OUTPATIENT
Start: 2022-07-14 | End: 2022-07-14 | Stop reason: HOSPADM

## 2022-07-14 RX ORDER — NALOXONE HYDROCHLORIDE 0.4 MG/ML
0.4 INJECTION, SOLUTION INTRAMUSCULAR; INTRAVENOUS; SUBCUTANEOUS
Status: DISCONTINUED | OUTPATIENT
Start: 2022-07-14 | End: 2022-07-14 | Stop reason: HOSPADM

## 2022-07-14 RX ORDER — SODIUM CHLORIDE 0.9 % (FLUSH) 0.9 %
5-40 SYRINGE (ML) INJECTION AS NEEDED
Status: DISCONTINUED | OUTPATIENT
Start: 2022-07-14 | End: 2022-07-14 | Stop reason: HOSPADM

## 2022-07-14 RX ADMIN — PROPOFOL 100 MG: 10 INJECTION, EMULSION INTRAVENOUS at 13:56

## 2022-07-14 RX ADMIN — PROPOFOL 100 MG: 10 INJECTION, EMULSION INTRAVENOUS at 14:03

## 2022-07-14 RX ADMIN — SODIUM CHLORIDE: 900 INJECTION, SOLUTION INTRAVENOUS at 13:52

## 2022-07-14 NOTE — ANESTHESIA POSTPROCEDURE EVALUATION
Procedure(s):  COLONOSCOPY  ENDOSCOPIC POLYPECTOMY.     MAC    Anesthesia Post Evaluation      Multimodal analgesia: multimodal analgesia not used between 6 hours prior to anesthesia start to PACU discharge  Patient location during evaluation: PACU  Patient participation: complete - patient participated  Level of consciousness: awake and awake and alert  Pain score: 0  Pain management: adequate  Airway patency: patent  Cardiovascular status: acceptable  Respiratory status: acceptable  Hydration status: acceptable  Post anesthesia nausea and vomiting:  none  Final Post Anesthesia Temperature Assessment:  Normothermia (36.0-37.5 degrees C)      INITIAL Post-op Vital signs:   Vitals Value Taken Time   /45 07/14/22 1424   Temp 36.6 °C (97.8 °F) 07/14/22 1419   Pulse 53 07/14/22 1424   Resp 20 07/14/22 1424   SpO2 95 % 07/14/22 1424

## 2022-07-14 NOTE — H&P
[address]  174 [facility], [address] [place] [address]      History and Physical       NAME:  Tai Jay   :   1945   MRN:   [id number]             History of Present Illness:  Patient is a 65 y.o. who is seen for screening colonoscopy . PMH:  Past Medical History:   Diagnosis Date    Adverse effect of anesthesia     difficulty \"heavy narcotic anesthesia\"    Arthritis     Asthma     INHALER USED DAILY    CAD (coronary artery disease)     Cancer ([address]     SKIN - ? BCCA and ?squamous cell removed    Chronic obstructive pulmonary disease (HCC)     GERD (gastroesophageal reflux disease)     Other ill-defined conditions(799.89)     HIGH CHOLESTEROL    Other ill-defined conditions(799.89)     PVD    Other ill-defined conditions(799.89)     OCCAS INSOMNIA       PSH:  Past Surgical History:   Procedure Laterality Date    COLONOSCOPY N/A 2019    COLONOSCOPY performed by Dorothea Jiménez MD at [address] [place] [address] N/A 2021    . COLONOSCOPY   :- performed by Dorothea Jiménez MD at [facility] ENDOSCOPY    HX KNEE ARTHROSCOPY Left     HX ORTHOPAEDIC      surgery for broken wrist    [place] CABG, ARTERY-VEIN, THREE  05/15/2019    triple bypass    VASCULAR SURGERY PROCEDURE UNLIST      LEFT LEG, SFA ANGIOPLASTY    VASCULAR SURGERY PROCEDURE UNLIST      cleaned out carotid artery    VASCULAR SURGERY PROCEDURE UNLIST      right leg angioplasty       Allergies:  No Known Allergies    Home Medications:  Prior to Admission Medications   Prescriptions Last Dose Informant Patient Reported? Taking? albuterol (PROAIR HFA) 90 mcg/actuation inhaler 2022 at Unknown time  Yes Yes   Sig: Take 1 Puff by inhalation as needed for Wheezing. atorvastatin (LIPITOR) 40 mg tablet 2022 at Unknown time  Yes Yes   Sig: Take 40 mg by mouth nightly.    fluticasone furoate-vilanterol (BREO ELLIPTA) 200-25 mcg/dose inhaler 2022 at Unknown time  Yes Yes   Sig: Take 1 Puff by inhalation daily. fluticasone propionate (ALLERGY RELIEF, FLUTICASONE, NA) 7/11/2022  Yes No   Sig: by Nasal route. Fluticasone nasal spray, one spray both nostrils daily as needed. melatonin 3 mg tablet 6/14/2022 at Unknown time  Yes Yes   Sig: Take 3-6 mg by mouth nightly as needed. metoprolol tartrate (LOPRESSOR) 25 mg tablet 7/13/2022 at Unknown time  No Yes   Sig: Take 0.5 Tabs by mouth every twelve (12) hours. montelukast (SINGULAIR) 10 mg tablet 7/13/2022 at Unknown time  Yes Yes   Sig: Take 10 mg by mouth daily. tamsulosin (FLOMAX) 0.4 mg capsule 7/13/2022 at Unknown time  Yes Yes   Sig: Take 0.4 mg by mouth daily. Facility-Administered Medications: None       Hospital Medications:  Current Facility-Administered Medications   Medication Dose Route Frequency    0.9% sodium chloride infusion  50 mL/hr IntraVENous CONTINUOUS    sodium chloride (NS) flush 5-40 mL  5-40 mL IntraVENous Q8H    sodium chloride (NS) flush 5-40 mL  5-40 mL IntraVENous PRN    midazolam (VERSED) injection 0.25-5 mg  0.25-5 mg IntraVENous Multiple    fentaNYL citrate (PF) injection  mcg   mcg IntraVENous Multiple    naloxone (NARCAN) injection 0.4 mg  0.4 mg IntraVENous Multiple    flumazeniL (ROMAZICON) 0.1 mg/mL injection 0.2 mg  0.2 mg IntraVENous Multiple    simethicone (MYLICON) 58UV/2.5FU oral drops 80 mg  1.2 mL Oral Multiple    atropine injection 0.5 mg  0.5 mg IntraVENous ONCE PRN    EPINEPHrine (ADRENALIN) 0.1 mg/mL syringe 1 mg  1 mg Endoscopically ONCE PRN       Social History:  Social History     Tobacco Use    Smoking status: Former Smoker     Packs/day: 0.75     Years: 50.00     Pack years: 37.50     Quit date: 5/9/2019     Years since quitting: 3.1    Smokeless tobacco: Never Used   Substance Use Topics    Alcohol use:  Yes     Alcohol/week: 4.2 standard drinks     Types: 5 Standard drinks or equivalent per week     Comment: occasional drink       Family History:  Family History Problem Relation Age of Onset    Hypertension Mother     Heart Failure Mother         CHF    Hypertension Father     Heart Failure Father         CHF         The patient was counseled at length about the risks of kush Covid-19 in the lynnette-operative and post-operative states including the recovery window of their procedure. The patient was made aware that kush Covid-19 after a surgical procedure may worsen their prognosis for recovering from the virus and lend to a higher morbidity and or mortality risk. The patient was given the options of postponing their procedure. All of the risks, benefits, and alternatives were discussed. The patient does  wish to proceed with the procedure. Review of Systems:      Constitutional: negative fever, negative chills, negative weight loss  Eyes:   negative visual changes  ENT:   negative sore throat, tongue or lip swelling  Respiratory:  negative cough, negative dyspnea  Cards:  negative for chest pain, palpitations, lower extremity edema  GI:   See HPI  :  negative for frequency, dysuria  Integument:  negative for rash and pruritus  Heme:  negative for easy bruising and gum/nose bleeding  Musculoskel: negative for myalgias,  back pain and muscle weakness  Neuro: negative for headaches, dizziness, vertigo  Psych:  negative for feelings of anxiety, depression       Objective:     Patient Vitals for the past 8 hrs:   BP Temp Pulse Resp SpO2 Height Weight   07/14/22 1310 (!) 159/61 97.3 °F (36.3 °C) (!) 53 18 95 % 5' 7\" (1.702 m) 76.2 kg (168 lb)     No intake/output data recorded. No intake/output data recorded. EXAM:     NEURO-a&o   HEENT-wnl   LUNGS-clear    COR-regular rate and rhythym     ABD-soft , no tenderness, no rebound, good bs     EXT-no edema     Data Review     No results for input(s): WBC, HGB, HCT, PLT, HGBEXT, HCTEXT, PLTEXT in the last 72 hours. No results for input(s): NA, K, CL, CO2, BUN, CREA, GLU, PHOS, CA in the last 72 hours.   No results for input(s): AP, TBIL, TP, ALB, GLOB, GGT, AML, LPSE in the last 72 hours. No lab exists for component: SGOT, GPT, AMYP, HLPSE  No results for input(s): INR, PTP, APTT, INREXT in the last 72 hours.        Assessment:     · Screening colonoscopy   · H/o colon polyps     Patient Active Problem List   Diagnosis Code    Carotid stenosis I65.29    CAD (coronary artery disease) I25.10    S/P CABG x 3 Z95.1         Plan:   ·   · Endoscopic procedure with sedation     Signed By: Debora Walters MD     7/14/2022  1:46 PM

## 2022-07-14 NOTE — DISCHARGE INSTRUCTIONS
[address], [address] [place] [address]    COLON DISCHARGE INSTRUCTIONS    Danielle Washington  [id number]  1945    Discomfort:  Redness at IV site- apply warm compress to area; if redness or soreness persist- contact your physician  There may be a slight amount of blood passed from the rectum  Gaseous discomfort- walking, belching will help relieve any discomfort  You may not operate a vehicle for 12 hours  You may not engage in an occupation involving machinery or appliances for rest of today  You may not drink alcoholic beverages for at least 12 hours  Avoid making any critical decisions for at least 24 hour  DIET:  You may resume your regular diet - however -  remember your colon is empty and a heavy meal will produce gas. Avoid these foods:  vegetables, fried / greasy foods, carbonated drinks     ACTIVITY:  You may  resume your normal daily activities it is recommended that you spend the remainder of the day resting -  avoid any strenuous activity. CALL M.D. ANY SIGN OF:   Increasing pain, nausea, vomiting  Abdominal distension (swelling)  New increased bleeding (oral or rectal)  Fever (chills)  Pain in chest area  Bloody discharge from nose or mouth  Shortness of breath      Follow-up Instructions:   Call Dr. Heike Jean for any questions or problems at [address] [place] [address] St:   Your colonoscopy showed 5 small polyps, all removed, rest of exam was normal.  Telephone # [phone]      Signed By: Heike Jean MD     7/14/2022  2:23 PM       DISCHARGE SUMMARY from Nurse    The following personal items collected during your admission are returned to you:   Dental Appliance: Dental Appliances: None  Vision: Visual Aid: Glasses  Hearing Aid:    Jewelry:    Clothing:    Other Valuables:    Valuables sent to safe:        Learning About Coronavirus (COVID-19)  Coronavirus (COVID-19): Overview  What is coronavirus ([id number])?   The coronavirus disease (COVID-19) is caused by a virus. It is an illness that was first found in [facility], in December 2019. It has since spread worldwide. The virus can cause fever, cough, and trouble breathing. In severe cases, it can cause pneumonia and make it hard to breathe without help. It can cause death. Coronaviruses are a large group of viruses. They cause the common cold. They also cause more serious illnesses like Middle East respiratory syndrome (MERS) and severe acute respiratory syndrome (SARS). COVID-19 is caused by a novel coronavirus. That means it's a new type that has not been seen in people before. This virus spreads person-to-person through droplets from coughing and sneezing. It can also spread when you are close to someone who is infected. And it can spread when you touch something that has the virus on it, such as a doorknob or a tabletop. What can you do to protect yourself from coronavirus (COVID-19)? The best way to protect yourself from getting sick is to:  · Avoid areas where there is an outbreak. · Avoid contact with people who may be infected. · Wash your hands often with soap or alcohol-based hand sanitizers. · Avoid crowds and try to stay at least 6 feet away from other people. · Wash your hands often, especially after you cough or sneeze. Use soap and water, and scrub for at least 20 seconds. If soap and water aren't available, use an alcohol-based hand . · Avoid touching your mouth, nose, and eyes. What can you do to avoid spreading the virus to others? To help avoid spreading the virus to others:  · Cover your mouth with a tissue when you cough or sneeze. Then throw the tissue in the trash. · Use a disinfectant to clean things that you touch often. · Stay home if you are sick or have been exposed to the virus. Don't go to school, work, or public areas. And don't use public transportation. · If you are sick:  ? Leave your home only if you need to get medical care.  But call the doctor's office first so they know you're coming. And wear a face mask, if you have one.  ? If you have a face mask, wear it whenever you're around other people. It can help stop the spread of the virus when you cough or sneeze. ? Clean and disinfect your home every day. Use household  and disinfectant wipes or sprays. Take special care to clean things that you grab with your hands. These include doorknobs, remote controls, phones, and handles on your refrigerator and microwave. And don't forget countertops, tabletops, bathrooms, and computer keyboards. When to call for help  Call 911 anytime you think you may need emergency care. For example, call if:  · You have severe trouble breathing. (You can't talk at all.)  · You have constant chest pain or pressure. · You are severely dizzy or lightheaded. · You are confused or can't think clearly. · Your face and lips have a blue color. · You pass out (lose consciousness) or are very hard to wake up. Call your doctor now if you develop symptoms such as:  · Shortness of breath. · Fever. · Cough. If you need to get care, call ahead to the doctor's office for instructions before you go. Make sure you wear a face mask, if you have one, to prevent exposing other people to the virus. Where can you get the latest information? The following health organizations are tracking and studying this virus. Their websites contain the most up-to-date information. Ran Napier also learn what to do if you think you may have been exposed to the virus. · U.S. Centers for Disease Control and Prevention (CDC): The CDC provides updated news about the disease and travel advice. The website also tells you how to prevent the spread of infection. www.cdc.gov  · World Health Organization [facility]): WHO offers information about the virus outbreaks.  WHO also has travel advice. www.who.int  Current as of: April 1, 2020               Content Version: 12.4  © 5862-2061 [employer]. Care instructions adapted under license by your healthcare professional. If you have questions about a medical condition or this instruction, always ask your healthcare professional. [address] any warranty or liability for your use of this information.

## 2022-07-14 NOTE — PROGRESS NOTES
Jerisalvador Decree  1945  [id number]    Situation:  Verbal report received from: TRUDY Cervantes  Procedure: Procedure(s):  COLONOSCOPY  ENDOSCOPIC POLYPECTOMY    Background:    Preoperative diagnosis: COLON SCREENING  Postoperative diagnosis: colon polyps    :  Dr. Maninder Gar  Assistant(s): Endoscopy Technician-1: Demarcus Julio  Endoscopy RN-1: Kaveh Hughes RN    Specimens:   ID Type Source Tests Collected by Time Destination   1 : polyp ascending Preservative Colon, Ascending  Artis Biggs MD 7/14/2022 1401 Pathology   2 : polyps, transverse,2 Preservative Colon, Transverse  Artis Biggs MD 7/14/2022 1404 Pathology   3 : polyps, sigmoid, 2 Preservative Sigmoid  Surendra Jennings MD 7/14/2022 1406 Pathology     H. Pylori  no    Assessment:  Intra-procedure medications     Anesthesia gave intra-procedure sedation and medications, see anesthesia flow sheet yes    Intravenous fluids: NS@ KVO     Vital signs stable yes      Abdominal assessment: round and soft yes    Recommendation:  Discharge patient per MD order yes.   Return to floor na  Family or Friend family  Permission to share finding with family or friend yes

## 2022-07-14 NOTE — PROGRESS NOTES
Endoscopy discharge instructions have been reviewed and given to patient. The patient verbalized understanding and acceptance of instructions. Dr. Fredo Suero discussed with spouse procedure findings and next steps.

## 2022-07-14 NOTE — PROCEDURES
[address], [address] [facility]      Colonoscopy Operative Report    Swati Winston  [id number]  1945      Procedure Type:   Colonoscopy with polypectomy (hot biopsy)     Indications:    Personal history of colon polyps (screening only)       Pre-operative Diagnosis: see indication above    Post-operative Diagnosis:  See findings below    :  Maulik Olivia MD    Surgical Assistant: Endoscopy Technician-1: Jonathan Gonzalez IV  Endoscopy RN-1: Matteo Perez RN    Implants:  None    Referring Provider: Vaibhav See MD      Sedation:  MAC anesthesia Propofol      Procedure Details:  After informed consent was obtained with all risks and benefits of procedure explained and preoperative exam completed, the patient was taken to the endoscopy suite and placed in the left lateral decubitus position. Upon sequential sedation as per above, a digital rectal exam was performed demonstrating internal hemorrhoids. The Olympus videocolonoscope  was inserted in the rectum and carefully advanced to the cecum, which was identified by the ileocecal valve and appendiceal orifice. The cecum was identified by the ileocecal valve and appendiceal orifice. The quality of preparation was good. The colonoscope was slowly withdrawn with careful evaluation between folds. Retroflexion in the rectum was completed . Findings:   Rectum: normal  Sigmoid: 2 sessile polyps around 1 cm in size each, removed by hot biopsies    Descending Colon: normal  Transverse Colon: 2 sessile polyps around 1 cm in size each, removed by hot biopsies      Ascending Colon: 1 sessile polyp, around 1 cm in size, removed by hot biopsies    Cecum: normal    Specimen Removed:  as above    Complications: None. EBL:  None. Impression:    see findings    Recommendations: --Await pathology.       Recommendation for next colonscopy in 3 years    Signed By: Maulik Olivia MD     7/14/2022  2:20 PM

## 2022-07-14 NOTE — ANESTHESIA PREPROCEDURE EVALUATION
Relevant Problems   CARDIOVASCULAR   (+) CAD (coronary artery disease)   (+) S/P CABG x 3       Anesthetic History               Review of Systems / Medical History  Patient summary reviewed, nursing notes reviewed and pertinent labs reviewed    Pulmonary    COPD        Asthma        Neuro/Psych              Cardiovascular              CAD      Comments: Echo 2019  · Mitral Valve: Mitral valve thickening. Mitral annular calcification. · Left Ventricle: Estimated left ventricular ejection fraction is 56 - 60%.          GI/Hepatic/Renal     GERD           Endo/Other        Arthritis     Other Findings   Comments: 1249  Carotid stenosis  CAD (coronary artery disease)  S/P CABG x 3    Results for Sakshi Jacobs (MRN [id number]) as of 7/14/2022 12:49    5/20/2019 04:33  HGB: 12.6  HCT: 38.8  MCV: 95.6  MCH: 31.0  MCHC: 32.5  RDW: 12.8  PLATELET: 053    Results for Sakshi Jacobs (MRN [id number]) as of 7/14/2022 12:49    5/20/2019 04:33  Sodium: 140  Potassium: 3.7  Chloride: 104  CO2: 29  Anion gap: 7  Glucose: 83  BUN: 21 (H)  Creatinine: 1.02           Physical Exam    Airway  Mallampati: II  TM Distance: 4 - 6 cm    Mouth opening: Normal     Cardiovascular  Regular rate and rhythm,  S1 and S2 normal,  no murmur, click, rub, or gallop  Rhythm: regular  Rate: normal         Dental  No notable dental hx       Pulmonary  Breath sounds clear to auscultation               Abdominal         Other Findings            Anesthetic Plan    ASA: 3  Anesthesia type: MAC          Induction: Intravenous  Anesthetic plan and risks discussed with: Patient

## 2023-04-19 ENCOUNTER — TRANSCRIBE ORDER (OUTPATIENT)
Dept: SCHEDULING | Age: 78
End: 2023-04-19

## 2023-04-19 DIAGNOSIS — R97.20 ELEVATED PROSTATE SPECIFIC ANTIGEN (PSA): Primary | ICD-10-CM

## 2023-05-04 ENCOUNTER — HOSPITAL ENCOUNTER (OUTPATIENT)
Dept: MRI IMAGING | Age: 78
Discharge: HOME | End: 2023-05-04
Attending: UROLOGY
Payer: MEDICARE

## 2023-05-04 DIAGNOSIS — R97.20 ELEVATED PROSTATE SPECIFIC ANTIGEN (PSA): ICD-10-CM

## 2023-05-04 PROCEDURE — A9576 INJ PROHANCE MULTIPACK: HCPCS

## 2023-05-04 PROCEDURE — 72197 MRI PELVIS W/O & W/DYE: CPT

## 2023-05-04 PROCEDURE — 74011000250 HC RX REV CODE- 250: Performed by: UROLOGY

## 2023-05-04 PROCEDURE — 74011000258 HC RX REV CODE- 258: Performed by: UROLOGY

## 2023-05-04 PROCEDURE — 74011250636 HC RX REV CODE- 250/636

## 2023-05-04 RX ORDER — SODIUM CHLORIDE 0.9 % (FLUSH) 0.9 %
10 SYRINGE (ML) INJECTION
Status: COMPLETED | OUTPATIENT
Start: 2023-05-04 | End: 2023-05-04

## 2023-05-04 RX ADMIN — GADOTERIDOL 15 ML: 279.3 INJECTION, SOLUTION INTRAVENOUS at 16:43

## 2023-05-04 RX ADMIN — SODIUM CHLORIDE, PRESERVATIVE FREE 10 ML: 5 INJECTION INTRAVENOUS at 16:44

## 2023-05-04 RX ADMIN — SODIUM CHLORIDE 100 ML: 9 INJECTION, SOLUTION INTRAVENOUS at 16:44

## 2024-07-17 NOTE — PROGRESS NOTES
1700: TRANSFER - IN REPORT: 
 
Verbal report received from [address] RN(name) on [facility]  being received from Cath lab(unit) for routine progression of care Report consisted of patients Situation, Background, Assessment and  
Recommendations(SBAR). Information from the following report(s) SBAR and Kardex was reviewed with the receiving nurse. Opportunity for questions and clarification was provided. Assessment completed upon patients arrival to unit and care assumed. 75

## (undated) DEVICE — AORTIC PUNCHES ARE USED TO CREATE A UNIFORM OPENING IN BLOOD VESSELS DURING CARDIOVASCULAR SURGERY. THE VESSEL GRAFT IS INSERTED INTO THE CREATED OPENING AND SUTURED TO THE VESSEL WALL. AORTIC LANCETS ARE USED TO MAKE A SMALL UNIFORM CUT IN A BLOOD VESSEL TO FACILITATE INSERTION OF AN AORTIC PUNCH.  PUNCHES COME IN VARIOUS LENGTHS, DIAMETERS AND TIP CONFIGURATIONS.: Brand: CLEANCUT ROTATING AORTIC PUNCH

## (undated) DEVICE — SUTURE DEK POLY GRN BR SZ3 0 T 2 2N 6716

## (undated) DEVICE — DRAIN SURG 24FR L5/16IN DIA8MM SIL RND HUBLESS FULL FLUT

## (undated) DEVICE — KIT BLWR MISTER 5P 15L W/ TBNG SET IRRIG MIST TO IMPROVE

## (undated) DEVICE — SUTURE SZ 7 L18IN NONABSORBABLE SIL CCS L48MM 1/2 CIR STRNM M655G

## (undated) DEVICE — LUB SURG MEDC STRL 2OZ TUBE MC -- MEDICHOICE

## (undated) DEVICE — STERNUM BLADE, OFFSET (31.7 X 0.64 X 6.3MM)

## (undated) DEVICE — ANGIOGRAPHIC CATHETER: Brand: IMPULSE™

## (undated) DEVICE — 1200 GUARD II KIT W/5MM TUBE W/O VAC TUBE: Brand: GUARDIAN

## (undated) DEVICE — GLIDESHEATH SLENDER STAINLESS STEEL KIT: Brand: GLIDESHEATH SLENDER

## (undated) DEVICE — DRAPE SLUSH DISC W44XL66IN ST FOR RND BSIN HUSH SLUSH SYS

## (undated) DEVICE — KIT MED IMAG CNTRST AGNT W/ IOPAMIDOL REUSE

## (undated) DEVICE — BAG PRESSURE INFUSION 1000ML -- CONVERT TO ITEM 360122

## (undated) DEVICE — DRESSING FOAM W7 3 10XL95IN SIL POLYUR HEEL SELF ADH W BORD

## (undated) DEVICE — SUT PROL 7-0 24IN BV1 DA BLU --

## (undated) DEVICE — APPLICATOR BNDG 1MM ADH PREMIERPRO EXOFIN

## (undated) DEVICE — TUBING HYDR IRR --

## (undated) DEVICE — LEAD PCMKR MYOCARDL BPLR TEMP. --

## (undated) DEVICE — REM POLYHESIVE ADULT PATIENT RETURN ELECTRODE: Brand: VALLEYLAB

## (undated) DEVICE — FCPS BX HOT RJ4 2.2MMX240CM -- RADIAL JAW 4 BX/40

## (undated) DEVICE — BANDAGE COMPR ELASTIC 5 YDX6 IN

## (undated) DEVICE — SOLUTION IRRIG 1000ML H2O STRL BLT

## (undated) DEVICE — SUTURE PROL SZ 6-0 L24IN NONABSORBABLE BLU L13MM C-1 3/8 8726H

## (undated) DEVICE — CARD SMRT DISP TRANSIT TIME MEDISTEM VERIQ

## (undated) DEVICE — Device

## (undated) DEVICE — DRESSING AQUACEL ADVANTAGE ALG W4XL5IN RECT GREATER ABSRB HYDRFBR TECHNOLOGY

## (undated) DEVICE — ANGIOGRAPHY KIT

## (undated) DEVICE — GUIDEWIRE VASC L260CM DIA0.035IN TIP L3MM STD EXCHG PTFE J

## (undated) DEVICE — STRAP POS KNEE BODY VELC

## (undated) DEVICE — SOLUTION IV 1000ML PH 7.4 INJ NRMSOL R

## (undated) DEVICE — SUTURE STRATAFIX SPRL SZ 4 0 L12IN ABSRB UD FS L26MM 3 8 CIR SXMP2B413

## (undated) DEVICE — SUTURE PROL SZ 4-0 L36IN NONABSORBABLE BLU L26MM SH 1/2 CIR 8521H

## (undated) DEVICE — BLADE OPHTH 180DEG CUT SURF BLU STR SHRP DBL BVL GRINDLESS

## (undated) DEVICE — SNARE ENDOSCP M L240CM W27MM SHTH DIA2.4MM CHN 2.8MM OVL

## (undated) DEVICE — SOLUTION IV 1000ML 0.9% SOD CHL

## (undated) DEVICE — TEMP PACING WIRE: Brand: MYO/WIRE

## (undated) DEVICE — STERILE POLYISOPRENE POWDER-FREE SURGICAL GLOVES WITH EMOLLIENT COATING: Brand: PROTEXIS

## (undated) DEVICE — SUTURE PROL SZ 5-0 L30IN NONABSORBABLE BLU L13MM RB-2 1/2 8710H

## (undated) DEVICE — SOLUTION IV 500ML 0.9% SOD CHL FLX CONT

## (undated) DEVICE — CATHETER IV 14GA L2IN POLYUR STR ORNG HUB SFTY RADPQ DISP

## (undated) DEVICE — PRESSURE MONITORING SET: Brand: TRUWAVE

## (undated) DEVICE — GOWN,SIRUS,NONRNF,SETINSLV,XL,20/CS: Brand: MEDLINE

## (undated) DEVICE — PLEDGET SURG W3/16XL0.25IN THK1.65MM PTFE OVL FELT FOR THE

## (undated) DEVICE — 40418 TRENDELENBURG ONE-STEP ARM PROTECTORS LARGE (1 PAIR): Brand: 40418 TRENDELENBURG ONE-STEP ARM PROTECTORS LARGE (1 PAIR)

## (undated) DEVICE — DRAPE FLD WRM W44XL66IN C6L FOR INTRATEMP SYS THERMABASIN

## (undated) DEVICE — SOL ANTI-FOG 6ML MEDC -- MEDICHOICE - CONVERT TO 358427

## (undated) DEVICE — SPONGE GZ W4XL4IN COT 12 PLY TYP VII WVN C FLD DSGN

## (undated) DEVICE — BLADE OPHTH KNF D3MM 15DEG CATRCT BLU MICRO-SHARP

## (undated) DEVICE — SENSOR TEMP SKIN DISP

## (undated) DEVICE — MEDI-VAC NON-CONDUCTIVE SUCTION TUBING: Brand: CARDINAL HEALTH

## (undated) DEVICE — DRAPE PRB US TRNSDCR 6X96IN --

## (undated) DEVICE — NEEDLE HYPO 18GA L1.5IN PNK S STL HUB POLYPR SHLD REG BVL

## (undated) DEVICE — 6 FOOT DISPOSABLE EXTENSION CABLE WITH SAFE CONNECT / SCREW-DOWN

## (undated) DEVICE — CLIP INT SM WIDE RED TI TRNSVRS GRV CHEVRON SHP W PRECIS

## (undated) DEVICE — BAG RED 3PLY 2MIL 30X40 IN

## (undated) DEVICE — TUBING INSUFLTN 10FT LUER -- CONVERT TO ITEM 368568

## (undated) DEVICE — GLOVE SURG SZ 7.5 L11.2IN THK9.8MIL STRW LTX POLYMER BEAD

## (undated) DEVICE — INFECTION CONTROL KIT SYS

## (undated) DEVICE — TRAP SURG QUAD PARABOLA SLOT DSGN SFTY SCRN TRAPEASE

## (undated) DEVICE — SYR 50ML LR LCK 1ML GRAD NSAF --

## (undated) DEVICE — PLEDGET SUT SFT OVL 3 8X5 16IN

## (undated) DEVICE — TRAY CATHETER 16FR F INCLUDE LUB URIN M TEMP SENS 2 STATLOK STBL

## (undated) DEVICE — PACK PROCEDURE SURG HRT CATH

## (undated) DEVICE — SUTURE MCRYL SZ 3-0 L27IN ABSRB UD L24MM PS-1 3/8 CIR PRIM Y936H

## (undated) DEVICE — SYRINGE MED 20ML STD CLR PLAS LUERLOCK TIP N CTRL DISP

## (undated) DEVICE — TRANSFER PK BLD PROD 300ML --

## (undated) DEVICE — TIDISHIELD TRANSPORT, CONTAINMENT COVER FOR BACK TABLE 4'6" (1.37M) TO 8' (2.43M) IN LENGTH: Brand: TIDISHIELD

## (undated) DEVICE — PAD,ABDOMINAL,5"X9",ST,LF,25/BX: Brand: MEDLINE INDUSTRIES, INC.

## (undated) DEVICE — SUTURE PROL SZ 8-0 L24IN NONABSORBABLE BLU L6.5MM BV130-5 8732H

## (undated) DEVICE — INTENDED FOR TISSUE SEPARATION, AND OTHER PROCEDURES THAT REQUIRE A SHARP SURGICAL BLADE TO PUNCTURE OR CUT.: Brand: BARD-PARKER ® CARBON RIB-BACK BLADES

## (undated) DEVICE — INTENDED TO STANDARDIZE OR CAMERAS TO ALLOW FOR THE USE OF THE OR CAMERA COVER: Brand: ASPEN® O.R. CAMERA COVER

## (undated) DEVICE — INSERT SUT HLD F/OCTBS RETRCTR --

## (undated) DEVICE — KIT HND CTRL 3 W STPCOCK ROT END 54IN PREM HI PRSS TBNG AT

## (undated) DEVICE — BANDAGE COMPR M W6INXL10YD WHT BGE VELC E MTRX HK AND LOOP

## (undated) DEVICE — COVER LT HNDL PLAS RIG 1 PER PK

## (undated) DEVICE — KIT MFLD ISOLATN NACL CNTRST PRT TBNG SPIK W/ PRSS TRNSDUC

## (undated) DEVICE — SYS VSL HARV HEMOPRO2 VASOVIEW -- HARV SYS MINIMUM ORDER 5/EA

## (undated) DEVICE — TR BAND RADIAL ARTERY COMPRESSION DEVICE: Brand: TR BAND

## (undated) DEVICE — (D)PREP SKN CHLRAPRP APPL 26ML -- CONVERT TO ITEM 371833

## (undated) DEVICE — SYR 10ML LUER LOK 1/5ML GRAD --